# Patient Record
Sex: FEMALE | Race: WHITE | Employment: UNEMPLOYED | URBAN - METROPOLITAN AREA
[De-identification: names, ages, dates, MRNs, and addresses within clinical notes are randomized per-mention and may not be internally consistent; named-entity substitution may affect disease eponyms.]

---

## 2018-06-25 ENCOUNTER — DOCUMENTATION ONLY (OUTPATIENT)
Dept: SURGERY | Age: 43
End: 2018-06-25

## 2018-06-25 ENCOUNTER — HOSPITAL ENCOUNTER (OUTPATIENT)
Dept: MRI IMAGING | Age: 43
Discharge: HOME | End: 2018-06-25
Attending: OBSTETRICS & GYNECOLOGY
Payer: COMMERCIAL

## 2018-06-25 ENCOUNTER — OFFICE VISIT (OUTPATIENT)
Dept: SURGERY | Age: 43
End: 2018-06-25

## 2018-06-25 VITALS — BODY MASS INDEX: 22.5 KG/M2 | WEIGHT: 148 LBS

## 2018-06-25 VITALS
HEART RATE: 75 BPM | SYSTOLIC BLOOD PRESSURE: 111 MMHG | BODY MASS INDEX: 22.43 KG/M2 | WEIGHT: 148 LBS | DIASTOLIC BLOOD PRESSURE: 68 MMHG | HEIGHT: 68 IN

## 2018-06-25 DIAGNOSIS — C50.912 MALIGNANT NEOPLASM OF LEFT FEMALE BREAST, UNSPECIFIED ESTROGEN RECEPTOR STATUS, UNSPECIFIED SITE OF BREAST (HCC): Primary | ICD-10-CM

## 2018-06-25 DIAGNOSIS — C50.512 MALIGNANT NEOPLASM OF LOWER-OUTER QUADRANT OF LEFT BREAST OF FEMALE, ESTROGEN RECEPTOR POSITIVE (HCC): Primary | ICD-10-CM

## 2018-06-25 DIAGNOSIS — Z17.0 MALIGNANT NEOPLASM OF LOWER-OUTER QUADRANT OF LEFT BREAST OF FEMALE, ESTROGEN RECEPTOR POSITIVE (HCC): Primary | ICD-10-CM

## 2018-06-25 DIAGNOSIS — C50.819: ICD-10-CM

## 2018-06-25 PROCEDURE — 74011250636 HC RX REV CODE- 250/636: Performed by: OBSTETRICS & GYNECOLOGY

## 2018-06-25 PROCEDURE — 77059 MRI BREAST BI W WO CONT: CPT

## 2018-06-25 PROCEDURE — A9585 GADOBUTROL INJECTION: HCPCS | Performed by: OBSTETRICS & GYNECOLOGY

## 2018-06-25 RX ADMIN — GADOBUTROL 6 ML: 604.72 INJECTION INTRAVENOUS at 14:00

## 2018-06-25 NOTE — PATIENT INSTRUCTIONS
Breast Cancer: Care Instructions  Your Care Instructions    Breast cancer occurs when abnormal cells grow out of control in the breast. These cancer cells can spread within the breast, to nearby lymph nodes and other tissues, and to other parts of the body. Being treated for cancer can weaken your body, and you may feel very tired. Get the rest your body needs so you can feel better. Finding out that you have cancer is scary. You may feel many emotions and may need some help coping. Seek out family, friends, and counselors for support. You also can do things at home to make yourself feel better while you go through treatment. Call the [employer] (8-856.767.9291) or visit its website at [facility] for more information. Follow-up care is a key part of your treatment and safety. Be sure to make and go to all appointments, and call your doctor if you are having problems. It's also a good idea to know your test results and keep a list of the medicines you take. How can you care for yourself at home? · Take your medicines exactly as prescribed. Call your doctor if you think you are having a problem with your medicine. You may get medicine for nausea and vomiting if you have these side effects. · Follow your doctor's instructions to relieve pain. Pain from cancer and surgery can almost always be controlled. Use pain medicine when you first notice pain, before it becomes severe. · Eat healthy food. If you do not feel like eating, try to eat food that has protein and extra calories to keep up your strength and prevent weight loss. Drink liquid meal replacements for extra calories and protein. Try to eat your main meal early. · Get some physical activity every day, but do not get too tired. Keep doing the hobbies you enjoy as your energy allows. · Do not smoke. Smoking can make your cancer worse. If you need help quitting, talk to your doctor about stop-smoking programs and medicines.  These can increase your chances of quitting for good. · Take steps to control your stress and workload. Learn relaxation techniques. ¨ Share your feelings. Stress and tension affect our emotions. By expressing your feelings to others, you may be able to understand and cope with them. ¨ Consider joining a support group. Talking about a problem with your spouse, a good friend, or other people with similar problems is a good way to reduce tension and stress. ¨ Express yourself through art. Try writing, crafts, dance, or art to relieve stress. Some dance, writing, or art groups may be available just for people who have cancer. ¨ Be kind to your body and mind. Getting enough sleep, eating a healthy diet, and taking time to do things you enjoy can contribute to an overall feeling of balance in your life and can help reduce stress. ¨ Get help if you need it. Discuss your concerns with your doctor or counselor. · If you are vomiting or have diarrhea:  ¨ Drink plenty of fluids (enough so that your urine is light yellow or clear like water) to prevent dehydration. Choose water and other caffeine-free clear liquids. If you have kidney, heart, or liver disease and have to limit fluids, talk with your doctor before you increase the amount of fluids you drink. ¨ When you are able to eat, try clear soups, mild foods, and liquids until all symptoms are gone for 12 to 48 hours. Other good choices include dry toast, crackers, cooked cereal, and gelatin dessert, such as Jell-O.  · If you have not already done so, prepare a list of advance directives. Advance directives are instructions to your doctor and family members about what kind of care you want if you become unable to speak or express yourself. When should you call for help? Call 911 anytime you think you may need emergency care. For example, call if:  ? · You passed out (lost consciousness). ?Call your doctor now or seek immediate medical care if:  ? · You have a fever. ? · You have abnormal bleeding. ? · You think you have an infection. ? · You have new or worse pain. ? · You have new symptoms, such as a cough, belly pain, vomiting, diarrhea, or a rash. ? Watch closely for changes in your health, and be sure to contact your doctor if:  ? · You are much more tired than usual.   ? · You have swollen glands in your armpits, groin, or neck. ? · You do not get better as expected. Where can you learn more? Go to http://csear-tracee.info/. Enter V321 in the search box to learn more about \"Breast Cancer: Care Instructions. \"  Current as of: May 12, 2017  Content Version: 11.4  © 7292-7278 [employer]. Care instructions adapted under license by [employer] (which disclaims liability or warranty for this information). If you have questions about a medical condition or this instruction, always ask your healthcare professional. [address] any warranty or liability for your use of this information.

## 2018-06-25 NOTE — PROGRESS NOTES
Type of Film: [x] CD [] FILMS  Type of Test: [] MRI [x] MAMMO  From: 016/816 Geoffrey Brandt  Given to: given back to patient and instructed her to bring with her to MRI location ([address] [place] [address]) this afternoon.   LOCATION  To be Downloaded into PACS:  YES

## 2018-06-25 NOTE — PROGRESS NOTES
HISTORY OF PRESENT ILLNESS  Jocelyne Villasenor is a 43 y.o. female. HPI NEW Patient presents for consultation at the request of Dr. Linda Dubon at [facility] for newly diagnosed LEFT breast cancer. The patient noticed \"weird sensations\" in LEFT breast which led to diagnostic imaging and a biopsy. She is having tenderness at biopsy site. This is the first time she had a breast biopsy done. No family history of breast or ovarian cancer. Her mother had non-hodgkin's lymphoma. Breast imaging-  Screening mammogram was performed 17: BI-RADS 1. LEFT breast diagnostic mammogram and LEFT breast ultrasound done 18: BI-RADS 5. 2.1 cm hypoechoic mass 6:00 left breast.   LEFT breast biopsy done 18 at [facility]. Pathology revealed LEFT breast invasive carcinoma with lobular features, grade 2, ER positive, GA positive, HER 2 negative. The patient has a breast MRI scheduled for today 18. Past Medical History:   Diagnosis Date    Acquired hypothyroidism     on levothyroxine     Anemia NEC     slow fe     HX OTHER MEDICAL      live baby boy       Past Surgical History:   Procedure Laterality Date    HX OTHER SURGICAL      facial surgery     Social History     Social History    Marital status:      Spouse name: N/A    Number of children: N/A    Years of education: N/A     Occupational History    Not on file. Social History Main Topics    Smoking status: Former Smoker     Quit date:     Smokeless tobacco: Never Used    Alcohol use No    Drug use: No    Sexual activity: Yes     Partners: Male     Other Topics Concern    Not on file     Social History Narrative     OB History      Para Term  AB Living    2 2 2   2    SAB TAB Ectopic Molar Multiple Live Births         1        Obstetric Comments    Menarche:  15. LMP: 18. # of Children:  2. Age at Delivery of First Child:  35.   Hysterectomy/oophorectomy:  NO/NO.   Breast Bx: yes.  Hx of Breast Feeding:  yes. BCP:  yes. Hormone therapy:  no.               Current Outpatient Prescriptions:     levothyroxine (SYNTHROID) 125 mcg tablet, Take 137 mcg by mouth Daily (before breakfast). Indications: hypothyroidism, Disp: , Rfl:   No Known Allergies      Review of Systems   Constitutional: Negative. HENT: Negative. Eyes: Negative. Respiratory: Negative. Cardiovascular: Negative. Gastrointestinal: Negative. Genitourinary: Negative. Musculoskeletal: Negative. Skin: Negative. Neurological: Negative. Endo/Heme/Allergies: Negative. Psychiatric/Behavioral: Negative. Physical Exam   Constitutional: She is oriented to person, place, and time. She appears well-nourished. HENT:   Head: Normocephalic. Eyes: EOM are normal.   Neck: Neck supple. No thyromegaly present. Cardiovascular: Normal rate, regular rhythm, normal heart sounds and intact distal pulses. Pulmonary/Chest: Effort normal and breath sounds normal. Right breast exhibits no inverted nipple, no mass, no nipple discharge, no skin change and no tenderness. Left breast exhibits mass (2 cm firm mass 6:00 left breast ). Left breast exhibits no inverted nipple, no nipple discharge, no skin change and no tenderness. Breasts are symmetrical.       Musculoskeletal: Normal range of motion. Lymphadenopathy:     She has no cervical adenopathy. She has no axillary adenopathy. Neurological: She is alert and oriented to person, place, and time. Skin: Skin is warm and dry. Psychiatric: She has a normal mood and affect. Nursing note and vitals reviewed. ASSESSMENT and PLAN    ICD-10-CM ICD-9-CM    1. Malignant neoplasm of lower-outer quadrant of left breast of female, estrogen receptor positive ([facility] [address] C50.512 174.5     Z17.0 V86.0      44 yo female with left breast T2 N0  invasive carcinoma with lobular features, grade 2, ER positive, MT positive, HER 2 negative.   Also lcis and papilloma on path.  She is here with her . I have reviewed the imaging and pathology with her and she was given copies of these reports. 90 minutes were spent face-to-face with the patient during this encounter and 90% of that time was spent on counseling and coordination of care. 1. Discussed lumpectomy and radiation vs mastectomy. Discussed reconstruction. MRI ordered to see if patient is a candidate for a lumpectomy. This is schedule for this afternoon. 2. Discussed sentinel lymph node biopsy. 3. Discussed external beam radiation. 4. Discussed hormone therapy. 5. Discussed the possibility of chemotherapy. 6. Discussed genetic testing will send gene panel    Will see what breast mri looks like. She has small breasts so if any larger extent of disease may need mastectomy recon. If she has gene mutation needs mastectomies. I will call her tomorrow after breast mri and send gene panel today. They were happy with the plan.

## 2018-06-25 NOTE — LETTER
2018 11:08 AM 
 
Patient:  Trenton Aceves YOB: 1975 Date of Visit: 2018 Dear Jes Johnson MD 
[address] [id number] VIA In Basket 
 : Thank you for referring Ms. Mack Aquino to me for evaluation/treatment. Below are the relevant portions of my assessment and plan of care. HISTORY OF PRESENT ILLNESS Trenton Aceves is a 43 y.o. female. HPI NEW Patient presents for consultation at the request of Dr. Alf Gray at [facility] for newly diagnosed LEFT breast cancer. The patient noticed \"weird sensations\" in LEFT breast which led to diagnostic imaging and a biopsy. She is having tenderness at biopsy site. This is the first time she had a breast biopsy done. No family history of breast or ovarian cancer. Her mother had non-hodgkin's lymphoma. Breast imaging- 
Screening mammogram was performed 17: BI-RADS 1. LEFT breast diagnostic mammogram and LEFT breast ultrasound done 18: BI-RADS 5. 2.1 cm hypoechoic mass 6:00 left breast.  
LEFT breast biopsy done 18 at [facility]. Pathology revealed LEFT breast invasive carcinoma with lobular features, grade 2, ER positive, IA positive, HER 2 negative. The patient has a breast MRI scheduled for today 18. Past Medical History:  
Diagnosis Date  Acquired hypothyroidism   
 on levothyroxine  Anemia NEC   
 slow fe  HX OTHER MEDICAL   
  live baby boy  Past Surgical History:  
Procedure Laterality Date  HX OTHER SURGICAL    
 facial surgery Social History Social History  Marital status:  Spouse name: N/A  
 Number of children: N/A  
 Years of education: N/A Occupational History  Not on file. Social History Main Topics  Smoking status: Former Smoker Quit date:   Smokeless tobacco: Never Used  Alcohol use No  
 Drug use: No  
 Sexual activity: Yes  
  Partners: Male Other Topics Concern  Not on file Social History Narrative OB History  Para Term  AB Living 2 2 2   2 SAB TAB Ectopic Molar Multiple Live Births 1 Obstetric Comments Menarche:  15. LMP: 18. # of Children:  2. Age at Delivery of First Child:  35.   Hysterectomy/oophorectomy:  NO/NO. Breast Bx:  yes. Hx of Breast Feeding:  yes. BCP:  yes. Hormone therapy:  no.  
 
  
  
 
 
Current Outpatient Prescriptions:  
  levothyroxine (SYNTHROID) 125 mcg tablet, Take 137 mcg by mouth Daily (before breakfast). Indications: hypothyroidism, Disp: , Rfl:  
No Known Allergies Review of Systems Constitutional: Negative. HENT: Negative. Eyes: Negative. Respiratory: Negative. Cardiovascular: Negative. Gastrointestinal: Negative. Genitourinary: Negative. Musculoskeletal: Negative. Skin: Negative. Neurological: Negative. Endo/Heme/Allergies: Negative. Psychiatric/Behavioral: Negative. Physical Exam  
Constitutional: She is oriented to person, place, and time. She appears well-nourished. HENT:  
Head: Normocephalic. Eyes: EOM are normal.  
Neck: Neck supple. No thyromegaly present. Cardiovascular: Normal rate, regular rhythm, normal heart sounds and intact distal pulses. Pulmonary/Chest: Effort normal and breath sounds normal. Right breast exhibits no inverted nipple, no mass, no nipple discharge, no skin change and no tenderness. Left breast exhibits mass (2 cm firm mass 6:00 left breast ). Left breast exhibits no inverted nipple, no nipple discharge, no skin change and no tenderness. Breasts are symmetrical.  
 
 
Musculoskeletal: Normal range of motion. Lymphadenopathy:  
  She has no cervical adenopathy. She has no axillary adenopathy. Neurological: She is alert and oriented to person, place, and time. Skin: Skin is warm and dry. Psychiatric: She has a normal mood and affect. Nursing note and vitals reviewed. ASSESSMENT and PLAN 
  ICD-10-CM ICD-9-CM 1. Malignant neoplasm of lower-outer quadrant of left breast of female, estrogen receptor positive ([facility] [address] C50.512 174.5   
 Z17.0 V86.0   
 
42 yo female with left breast T2 N0  invasive carcinoma with lobular features, grade 2, ER positive, SD positive, HER 2 negative. Also lcis and papilloma on path. She is here with her . I have reviewed the imaging and pathology with her and she was given copies of these reports. 90 minutes were spent face-to-face with the patient during this encounter and 90% of that time was spent on counseling and coordination of care. 1. Discussed lumpectomy and radiation vs mastectomy. Discussed reconstruction. MRI ordered to see if patient is a candidate for a lumpectomy. This is schedule for this afternoon. 2. Discussed sentinel lymph node biopsy. 3. Discussed external beam radiation. 4. Discussed hormone therapy. 5. Discussed the possibility of chemotherapy. 6. Discussed genetic testing will send gene panel Will see what breast mri looks like. She has small breasts so if any larger extent of disease may need mastectomy recon. If she has gene mutation needs mastectomies. I will call her tomorrow after breast mri and send gene panel today. They were happy with the plan. If you have questions, please do not hesitate to call me. I look forward to following Ms. Villasenor along with you. Sincerely, Patito Villanueva MD

## 2018-06-25 NOTE — COMMUNICATION BODY
HISTORY OF PRESENT ILLNESS  Fidencio Dakin is a 43 y.o. female. HPI NEW Patient presents for consultation at the request of Dr. Kayode Burns at [facility] for newly diagnosed LEFT breast cancer. The patient noticed \"weird sensations\" in LEFT breast which led to diagnostic imaging and a biopsy. She is having tenderness at biopsy site. This is the first time she had a breast biopsy done. No family history of breast or ovarian cancer. Her mother had non-hodgkin's lymphoma. Breast imaging-  Screening mammogram was performed 17: BI-RADS 1. LEFT breast diagnostic mammogram and LEFT breast ultrasound done 18: BI-RADS 5. 2.1 cm hypoechoic mass 6:00 left breast.   LEFT breast biopsy done 18 at [facility]. Pathology revealed LEFT breast invasive carcinoma with lobular features, grade 2, ER positive, AL positive, HER 2 negative. The patient has a breast MRI scheduled for today 18. Past Medical History:   Diagnosis Date    Acquired hypothyroidism     on levothyroxine     Anemia NEC     slow fe     HX OTHER MEDICAL      live baby boy       Past Surgical History:   Procedure Laterality Date    HX OTHER SURGICAL      facial surgery     Social History     Social History    Marital status:      Spouse name: N/A    Number of children: N/A    Years of education: N/A     Occupational History    Not on file. Social History Main Topics    Smoking status: Former Smoker     Quit date:     Smokeless tobacco: Never Used    Alcohol use No    Drug use: No    Sexual activity: Yes     Partners: Male     Other Topics Concern    Not on file     Social History Narrative     OB History      Para Term  AB Living    2 2 2   2    SAB TAB Ectopic Molar Multiple Live Births         1        Obstetric Comments    Menarche:  15. LMP: 18. # of Children:  2. Age at Delivery of First Child:  35.   Hysterectomy/oophorectomy:  NO/NO.   Breast Bx: yes.  Hx of Breast Feeding:  yes. BCP:  yes. Hormone therapy:  no.               Current Outpatient Prescriptions:     levothyroxine (SYNTHROID) 125 mcg tablet, Take 137 mcg by mouth Daily (before breakfast). Indications: hypothyroidism, Disp: , Rfl:   No Known Allergies      Review of Systems   Constitutional: Negative. HENT: Negative. Eyes: Negative. Respiratory: Negative. Cardiovascular: Negative. Gastrointestinal: Negative. Genitourinary: Negative. Musculoskeletal: Negative. Skin: Negative. Neurological: Negative. Endo/Heme/Allergies: Negative. Psychiatric/Behavioral: Negative. Physical Exam   Constitutional: She is oriented to person, place, and time. She appears well-nourished. HENT:   Head: Normocephalic. Eyes: EOM are normal.   Neck: Neck supple. No thyromegaly present. Cardiovascular: Normal rate, regular rhythm, normal heart sounds and intact distal pulses. Pulmonary/Chest: Effort normal and breath sounds normal. Right breast exhibits no inverted nipple, no mass, no nipple discharge, no skin change and no tenderness. Left breast exhibits mass (2 cm firm mass 6:00 left breast ). Left breast exhibits no inverted nipple, no nipple discharge, no skin change and no tenderness. Breasts are symmetrical.       Musculoskeletal: Normal range of motion. Lymphadenopathy:     She has no cervical adenopathy. She has no axillary adenopathy. Neurological: She is alert and oriented to person, place, and time. Skin: Skin is warm and dry. Psychiatric: She has a normal mood and affect. Nursing note and vitals reviewed. ASSESSMENT and PLAN    ICD-10-CM ICD-9-CM    1. Malignant neoplasm of lower-outer quadrant of left breast of female, estrogen receptor positive ([facility] [address] C50.512 174.5     Z17.0 V86.0      44 yo female with left breast T2 N0  invasive carcinoma with lobular features, grade 2, ER positive, UT positive, HER 2 negative.   Also lcis and papilloma on path.  She is here with her . I have reviewed the imaging and pathology with her and she was given copies of these reports. 90 minutes were spent face-to-face with the patient during this encounter and 90% of that time was spent on counseling and coordination of care. 1. Discussed lumpectomy and radiation vs mastectomy. Discussed reconstruction. MRI ordered to see if patient is a candidate for a lumpectomy. This is schedule for this afternoon. 2. Discussed sentinel lymph node biopsy. 3. Discussed external beam radiation. 4. Discussed hormone therapy. 5. Discussed the possibility of chemotherapy. 6. Discussed genetic testing will send gene panel    Will see what breast mri looks like. She has small breasts so if any larger extent of disease may need mastectomy recon. If she has gene mutation needs mastectomies. I will call her tomorrow after breast mri and send gene panel today. They were happy with the plan.

## 2018-06-27 ENCOUNTER — TELEPHONE (OUTPATIENT)
Dept: SURGERY | Age: 43
End: 2018-06-27

## 2018-06-27 DIAGNOSIS — C50.512 MALIGNANT NEOPLASM OF LOWER-OUTER QUADRANT OF LEFT BREAST OF FEMALE, ESTROGEN RECEPTOR POSITIVE (HCC): Primary | ICD-10-CM

## 2018-06-27 DIAGNOSIS — Z17.0 MALIGNANT NEOPLASM OF LOWER-OUTER QUADRANT OF LEFT BREAST OF FEMALE, ESTROGEN RECEPTOR POSITIVE (HCC): Primary | ICD-10-CM

## 2018-06-27 NOTE — TELEPHONE ENCOUNTER
The patient called after speaking to Dr. Pablo Monk yesterday evening wanting to\"speed up the process\" of getting her appointment with a plastic surgeon. I instructed her that the  who makes these appointments is well aware of the order to schedule and she has 72 hours to get back to her. I assured the patient we were on top of it and someone will call her ASAP. She was appreciative for the call.

## 2018-06-28 ENCOUNTER — TELEPHONE (OUTPATIENT)
Dept: SURGERY | Age: 43
End: 2018-06-28

## 2018-07-11 ENCOUNTER — DOCUMENTATION ONLY (OUTPATIENT)
Dept: SURGERY | Age: 43
End: 2018-07-11

## 2018-07-11 NOTE — PROGRESS NOTES
Faxed office visit note to [employer], per request, for insurance coverage purposes. Fax # [phone]. Fax confirmation received.

## 2018-07-13 ENCOUNTER — DOCUMENTATION ONLY (OUTPATIENT)
Dept: SURGERY | Age: 43
End: 2018-07-13

## 2018-07-13 ENCOUNTER — TELEPHONE (OUTPATIENT)
Dept: SURGERY | Age: 43
End: 2018-07-13

## 2018-07-13 NOTE — TELEPHONE ENCOUNTER
Patient returned my call. She would like for us to set up a pre-op appt with med onc Dr. Gus Mireles. Will send this to Zana Guillory. Patient requesting that breast MRI be emailed to her. Confirmed email address in chart. Patient wanting to make pre-op appt to see Dr. Derek Pizano. PSR not available to talk right now. I will email PSRs and have someone call the patient to schedule.

## 2018-07-13 NOTE — PROGRESS NOTES
SURGERY SCHEDULED AT [facility] ON 7/31/18 AT 36 AM FOR LEFT BREAST NIPPLE DELAY . PATIENT TO ARRIVE  AM  [facility] ON 7/30/18 AT 3 PM ARRIVING  PM    PRE OP TESTING AT [place] ON 7/23/18 ARRIVING AT 10 AM    SURGERY AT [facility] ON 8/14/18 AT 1130 AM ARRIVING AT 10 AM    PRE OP INFORMATION GIVEN AND INSTRUCTIONS ON  ALL APPOINTMENTS MADE.

## 2018-07-13 NOTE — TELEPHONE ENCOUNTER
Returned pt's call. No answer. Left message with the followin) Dr. Romana Sibley would like to see her in office pre-op    2) Dr. Chet Green is med onc. We can make pre-op appt if patient wishes (requested that she call us back to let us know)    3) Patient is requesting copy of breast mri report. I requested that she call me back so I know how to get this to her. Provided office call back number.

## 2018-07-13 NOTE — TELEPHONE ENCOUNTER
----- Message from Reji Goldstein MD sent at 7/13/2018 10:23 AM EDT -----  Regarding: RE: med onc  1. Yes I would like to see her again  2. For med onc dr. Morris Almeida. She can see her preop if she wants.   ----- Message -----     From: Devonte García RN     Sent: 7/13/2018  10:01 AM       To: Reji Goldstein MD  Subject: med onc                                          Patient wants to know if she needs a pre-op appt with you prior to surgery. Also wants to know which medical oncologist you would send her to.     Thanks,  Katy Horne

## 2018-07-16 ENCOUNTER — DOCUMENTATION ONLY (OUTPATIENT)
Dept: SURGERY | Age: 43
End: 2018-07-16

## 2018-07-16 DIAGNOSIS — C50.512 MALIGNANT NEOPLASM OF LOWER-OUTER QUADRANT OF LEFT BREAST OF FEMALE, ESTROGEN RECEPTOR POSITIVE (HCC): Primary | ICD-10-CM

## 2018-07-16 DIAGNOSIS — Z17.0 MALIGNANT NEOPLASM OF LOWER-OUTER QUADRANT OF LEFT BREAST OF FEMALE, ESTROGEN RECEPTOR POSITIVE (HCC): Primary | ICD-10-CM

## 2018-07-16 NOTE — PROGRESS NOTES
Osorio Hurley appointment  July 20, 2018 @ 2:00 pm. [facility] . 2nd floor [address]. Patient may have to change the date .

## 2018-07-17 DIAGNOSIS — C50.512 MALIGNANT NEOPLASM OF LOWER-OUTER QUADRANT OF LEFT BREAST OF FEMALE, ESTROGEN RECEPTOR POSITIVE (HCC): Primary | ICD-10-CM

## 2018-07-17 DIAGNOSIS — Z17.0 MALIGNANT NEOPLASM OF LOWER-OUTER QUADRANT OF LEFT BREAST OF FEMALE, ESTROGEN RECEPTOR POSITIVE (HCC): Primary | ICD-10-CM

## 2018-07-18 ENCOUNTER — OFFICE VISIT (OUTPATIENT)
Dept: SURGERY | Age: 43
End: 2018-07-18

## 2018-07-18 ENCOUNTER — DOCUMENTATION ONLY (OUTPATIENT)
Dept: SURGERY | Age: 43
End: 2018-07-18

## 2018-07-18 VITALS — BODY MASS INDEX: 22.43 KG/M2 | HEART RATE: 69 BPM | HEIGHT: 68 IN | WEIGHT: 148 LBS

## 2018-07-18 DIAGNOSIS — C50.512 MALIGNANT NEOPLASM OF LOWER-OUTER QUADRANT OF LEFT BREAST OF FEMALE, ESTROGEN RECEPTOR POSITIVE (HCC): Primary | ICD-10-CM

## 2018-07-18 DIAGNOSIS — Z17.0 MALIGNANT NEOPLASM OF LOWER-OUTER QUADRANT OF LEFT BREAST OF FEMALE, ESTROGEN RECEPTOR POSITIVE (HCC): Primary | ICD-10-CM

## 2018-07-18 NOTE — PROGRESS NOTES
HISTORY OF PRESENT ILLNESS  Marla Calderón is a 43 y.o. female. HPI  ESTABLISHED patient here for to discuss breast cancer surgery. She is here with her . Nipple delay surgery is scheduled for 7/31/18 and her LEFT breast skin and nipple sparing mastectomy with reconstrction is scheduled for 8/14/18. She is doing well. Still has some tenderness to her nipple from her biopsy. 42 yo female with left breast T2 N0  invasive carcinoma with lobular features, grade 2, ER positive, NE positive, HER 2 negative. Also lcis and papilloma on path. No family history of breast or ovarian cancer. Her mother had non-hodgkin's lymphoma. 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 Review of Systems   All other systems reviewed and are negative. Physical Exam   Pulmonary/Chest:   Exam unchanged     Nursing note and vitals reviewed. ASSESSMENT and PLAN    ICD-10-CM ICD-9-CM    1. Malignant neoplasm of lower-outer quadrant of left breast of female, estrogen receptor positive ([facility] C50.512 174.5     Z17.0 V86.0      42 yo female with  left breast T2 N0  invasive carcinoma with lobular features, grade 2, ER positive, NE positive, HER 2 negative. Also lcis and papilloma on path. She is here to discuss surgery and postoperative care. We discussed the procedures of left nipple delay, sln biopsy, skin and nipple sparing mastectomy on left. Risks include bleeding, bruising, scar, infection, need for more surgery and lymphedema. She understood and wished to proceed. Discussed referral to med onc. She has appointments with Dr. Horacio Carson and Dr. Jessica George  Discussed genetic testing results of vus. Discussed postop wound care and activity restrictions. Total discussion time 30 minutes.

## 2018-07-18 NOTE — PATIENT INSTRUCTIONS
Breast Cancer: Care Instructions  Your Care Instructions    Breast cancer occurs when abnormal cells grow out of control in the breast. These cancer cells can spread within the breast, to nearby lymph nodes and other tissues, and to other parts of the body. Being treated for cancer can weaken your body, and you may feel very tired. Get the rest your body needs so you can feel better. Finding out that you have cancer is scary. You may feel many emotions and may need some help coping. Seek out family, friends, and counselors for support. You also can do things at home to make yourself feel better while you go through treatment. Call the [employer] (4-289.275.3388) or visit its website at [email or web address] for more information. Follow-up care is a key part of your treatment and safety. Be sure to make and go to all appointments, and call your doctor if you are having problems. It's also a good idea to know your test results and keep a list of the medicines you take. How can you care for yourself at home? · Take your medicines exactly as prescribed. Call your doctor if you think you are having a problem with your medicine. You may get medicine for nausea and vomiting if you have these side effects. · Follow your doctor's instructions to relieve pain. Pain from cancer and surgery can almost always be controlled. Use pain medicine when you first notice pain, before it becomes severe. · Eat healthy food. If you do not feel like eating, try to eat food that has protein and extra calories to keep up your strength and prevent weight loss. Drink liquid meal replacements for extra calories and protein. Try to eat your main meal early. · Get some physical activity every day, but do not get too tired. Keep doing the hobbies you enjoy as your energy allows. · Do not smoke. Smoking can make your cancer worse. If you need help quitting, talk to your doctor about stop-smoking programs and medicines.  These can increase your chances of quitting for good. · Take steps to control your stress and workload. Learn relaxation techniques. ¨ Share your feelings. Stress and tension affect our emotions. By expressing your feelings to others, you may be able to understand and cope with them. ¨ Consider joining a support group. Talking about a problem with your spouse, a good friend, or other people with similar problems is a good way to reduce tension and stress. ¨ Express yourself through art. Try writing, crafts, dance, or art to relieve stress. Some dance, writing, or art groups may be available just for people who have cancer. ¨ Be kind to your body and mind. Getting enough sleep, eating a healthy diet, and taking time to do things you enjoy can contribute to an overall feeling of balance in your life and can help reduce stress. ¨ Get help if you need it. Discuss your concerns with your doctor or counselor. · If you are vomiting or have diarrhea:  ¨ Drink plenty of fluids (enough so that your urine is light yellow or clear like water) to prevent dehydration. Choose water and other caffeine-free clear liquids. If you have kidney, heart, or liver disease and have to limit fluids, talk with your doctor before you increase the amount of fluids you drink. ¨ When you are able to eat, try clear soups, mild foods, and liquids until all symptoms are gone for 12 to 48 hours. Other good choices include dry toast, crackers, cooked cereal, and gelatin dessert, such as Jell-O.  · If you have not already done so, prepare a list of advance directives. Advance directives are instructions to your doctor and family members about what kind of care you want if you become unable to speak or express yourself. When should you call for help? Call 911 anytime you think you may need emergency care.  For example, call if:    · You passed out (lost consciousness).    Call your doctor now or seek immediate medical care if:    · You have a fever.     · You have abnormal bleeding.     · You think you have an infection.     · You have new or worse pain.     · You have new symptoms, such as a cough, belly pain, vomiting, diarrhea, or a rash.    Watch closely for changes in your health, and be sure to contact your doctor if:    · You are much more tired than usual.     · You have swollen glands in your armpits, groin, or neck.     · You do not get better as expected. Where can you learn more? Go to http://cesar-[employer].info/. Enter V321 in the search box to learn more about \"Breast Cancer: Care Instructions. \"  Current as of: May 12, 2017  Content Version: 11.7  © 3304-6401 [employer]. Care instructions adapted under license by [employer] (which disclaims liability or warranty for this information). If you have questions about a medical condition or this instruction, always ask your healthcare professional. [address] any warranty or liability for your use of this information.

## 2018-07-23 ENCOUNTER — HOSPITAL ENCOUNTER (OUTPATIENT)
Dept: PREADMISSION TESTING | Age: 43
Discharge: HOME | End: 2018-07-23
Payer: COMMERCIAL

## 2018-07-23 VITALS
TEMPERATURE: 98.4 F | WEIGHT: 146.25 LBS | DIASTOLIC BLOOD PRESSURE: 70 MMHG | HEIGHT: 68 IN | HEART RATE: 60 BPM | BODY MASS INDEX: 22.17 KG/M2 | SYSTOLIC BLOOD PRESSURE: 108 MMHG

## 2018-07-23 LAB
ANION GAP SERPL CALC-SCNC: 5 MMOL/L (ref 5–15)
BUN SERPL-MCNC: 18 MG/DL (ref 6–20)
BUN/CREAT SERPL: 20 (ref 12–20)
CALCIUM SERPL-MCNC: 9.2 MG/DL (ref 8.5–10.1)
CHLORIDE SERPL-SCNC: 109 MMOL/L (ref 97–108)
CO2 SERPL-SCNC: 28 MMOL/L (ref 21–32)
CREAT SERPL-MCNC: 0.92 MG/DL (ref 0.55–1.02)
ERYTHROCYTE [DISTWIDTH] IN BLOOD BY AUTOMATED COUNT: 12.5 % (ref 11.5–14.5)
GLUCOSE SERPL-MCNC: 91 MG/DL (ref 65–100)
HCT VFR BLD AUTO: 38.3 % (ref 35–47)
HGB BLD-MCNC: 12.4 G/DL (ref 11.5–16)
MCH RBC QN AUTO: 30 PG (ref 26–34)
MCHC RBC AUTO-ENTMCNC: 32.4 G/DL (ref 30–36.5)
MCV RBC AUTO: 92.7 FL (ref 80–99)
NRBC # BLD: 0 K/UL (ref 0–0.01)
NRBC BLD-RTO: 0 PER 100 WBC
PLATELET # BLD AUTO: 246 K/UL (ref 150–400)
PMV BLD AUTO: 10.1 FL (ref 8.9–12.9)
POTASSIUM SERPL-SCNC: 4.3 MMOL/L (ref 3.5–5.1)
RBC # BLD AUTO: 4.13 M/UL (ref 3.8–5.2)
SODIUM SERPL-SCNC: 142 MMOL/L (ref 136–145)
WBC # BLD AUTO: 6.4 K/UL (ref 3.6–11)

## 2018-07-23 PROCEDURE — 80048 BASIC METABOLIC PNL TOTAL CA: CPT | Performed by: SURGERY

## 2018-07-23 PROCEDURE — 85027 COMPLETE CBC AUTOMATED: CPT | Performed by: SURGERY

## 2018-07-23 PROCEDURE — 36415 COLL VENOUS BLD VENIPUNCTURE: CPT | Performed by: SURGERY

## 2018-07-23 RX ORDER — ACETAMINOPHEN 325 MG/1
650 TABLET ORAL
COMMUNITY
End: 2018-07-31

## 2018-07-24 ENCOUNTER — TELEPHONE (OUTPATIENT)
Dept: SURGERY | Age: 43
End: 2018-07-24

## 2018-07-24 NOTE — TELEPHONE ENCOUNTER
L/M that she wants to get an appointment with Dr. Odell Mcdonald pre-op. They will not make the appointment until they have the records. I called patient back. I let her know that we would fax everything tomorrow. She would like to see Dr. Odell Mcdonald pre-op. Is going to see Dr. Tess Streeter and Dr. Alexus Esquivel as well. She was very appreciative of the return call.

## 2018-07-25 NOTE — TELEPHONE ENCOUNTER
Faxed patient's records to Dr. Akins [facility] office, fax # [phone], per patient request. Fax confirmation received.

## 2018-07-26 ENCOUNTER — DOCUMENTATION ONLY (OUTPATIENT)
Dept: SURGERY | Age: 43
End: 2018-07-26

## 2018-07-26 NOTE — PROGRESS NOTES
Faxed office visit notes to Yuliya Fan, per request, fax # [phone]. Fax confirmation received.

## 2018-07-27 ENCOUNTER — DOCUMENTATION ONLY (OUTPATIENT)
Dept: SURGERY | Age: 43
End: 2018-07-27

## 2018-07-27 NOTE — PROGRESS NOTES
Completed disability/FMLA ppw and mailed via safe mail to patient's . Copy will be scanned into patient's chart.

## 2018-07-30 ENCOUNTER — ANESTHESIA EVENT (OUTPATIENT)
Dept: MEDSURG UNIT | Age: 43
End: 2018-07-30
Payer: COMMERCIAL

## 2018-07-30 ENCOUNTER — HOSPITAL ENCOUNTER (OUTPATIENT)
Dept: NUCLEAR MEDICINE | Age: 43
Discharge: HOME | End: 2018-07-30
Attending: SURGERY
Payer: COMMERCIAL

## 2018-07-30 DIAGNOSIS — C50.919 BREAST CANCER (HCC): ICD-10-CM

## 2018-07-30 PROCEDURE — 78195 LYMPH SYSTEM IMAGING: CPT

## 2018-07-31 ENCOUNTER — HOSPITAL ENCOUNTER (OUTPATIENT)
Age: 43
Setting detail: OUTPATIENT SURGERY
Discharge: HOME | End: 2018-07-31
Attending: SURGERY | Admitting: SURGERY
Payer: COMMERCIAL

## 2018-07-31 ENCOUNTER — ANESTHESIA (OUTPATIENT)
Dept: MEDSURG UNIT | Age: 43
End: 2018-07-31
Payer: COMMERCIAL

## 2018-07-31 VITALS
BODY MASS INDEX: 22.13 KG/M2 | HEART RATE: 69 BPM | RESPIRATION RATE: 15 BRPM | OXYGEN SATURATION: 100 % | SYSTOLIC BLOOD PRESSURE: 119 MMHG | DIASTOLIC BLOOD PRESSURE: 70 MMHG | TEMPERATURE: 98.1 F | WEIGHT: 146 LBS | HEIGHT: 68 IN

## 2018-07-31 LAB — HCG UR QL: NEGATIVE

## 2018-07-31 PROCEDURE — 88360 TUMOR IMMUNOHISTOCHEM/MANUAL: CPT | Performed by: SURGERY

## 2018-07-31 PROCEDURE — 77030039266 HC ADH SKN EXOFIN S2SG -A: Performed by: SURGERY

## 2018-07-31 PROCEDURE — 77030013567 HC DRN WND RESERV BARD -A: Performed by: SURGERY

## 2018-07-31 PROCEDURE — 77030011267 HC ELECTRD BLD COVD -A: Performed by: SURGERY

## 2018-07-31 PROCEDURE — 77030034626 HC LIGASURE SM JAW SEAL OPN SURG COVD -E: Performed by: SURGERY

## 2018-07-31 PROCEDURE — 81025 URINE PREGNANCY TEST: CPT

## 2018-07-31 PROCEDURE — 77030011266 HC ELECTRD BLD INSL COVD -A: Performed by: SURGERY

## 2018-07-31 PROCEDURE — 77030002933 HC SUT MCRYL J&J -A: Performed by: SURGERY

## 2018-07-31 PROCEDURE — 74011250636 HC RX REV CODE- 250/636

## 2018-07-31 PROCEDURE — 74011250636 HC RX REV CODE- 250/636: Performed by: ANESTHESIOLOGY

## 2018-07-31 PROCEDURE — 77030011825 HC SUPP SURG PSTOP S2SG -B: Performed by: SURGERY

## 2018-07-31 PROCEDURE — 77030020782 HC GWN BAIR PAWS FLX 3M -B

## 2018-07-31 PROCEDURE — 77030010514 HC APPL CLP LIG COVD -B: Performed by: SURGERY

## 2018-07-31 PROCEDURE — 77030012407 HC DRN WND BARD -B: Performed by: SURGERY

## 2018-07-31 PROCEDURE — 76030000004 HC AMB SURG OR TIME 2 TO 2.5: Performed by: SURGERY

## 2018-07-31 PROCEDURE — 77030031139 HC SUT VCRL2 J&J -A: Performed by: SURGERY

## 2018-07-31 PROCEDURE — 88342 IMHCHEM/IMCYTCHM 1ST ANTB: CPT | Performed by: SURGERY

## 2018-07-31 PROCEDURE — 88366 INSITU HYBRIDIZATION (FISH): CPT | Performed by: SURGERY

## 2018-07-31 PROCEDURE — 74011250636 HC RX REV CODE- 250/636: Performed by: SURGERY

## 2018-07-31 PROCEDURE — 74011000250 HC RX REV CODE- 250

## 2018-07-31 PROCEDURE — C9290 INJ, BUPIVACAINE LIPOSOME: HCPCS | Performed by: SURGERY

## 2018-07-31 PROCEDURE — 74011250637 HC RX REV CODE- 250/637: Performed by: ANESTHESIOLOGY

## 2018-07-31 PROCEDURE — 74011000250 HC RX REV CODE- 250: Performed by: SURGERY

## 2018-07-31 PROCEDURE — 77030011640 HC PAD GRND REM COVD -A: Performed by: SURGERY

## 2018-07-31 PROCEDURE — 77030010120 HC SHR COAG HARMO J&J -E: Performed by: SURGERY

## 2018-07-31 PROCEDURE — 77030010509 HC AIRWY LMA MSK TELE -A: Performed by: ANESTHESIOLOGY

## 2018-07-31 PROCEDURE — 76210000035 HC AMBSU PH I REC 1 TO 1.5 HR: Performed by: SURGERY

## 2018-07-31 PROCEDURE — 76060000064 HC AMB SURG ANES 2 TO 2.5 HR: Performed by: SURGERY

## 2018-07-31 PROCEDURE — 77030031304 HC WAVWGD EIGR DISP INVO -D: Performed by: SURGERY

## 2018-07-31 PROCEDURE — 88307 TISSUE EXAM BY PATHOLOGIST: CPT | Performed by: SURGERY

## 2018-07-31 PROCEDURE — 76210000050 HC AMBSU PH II REC 0.5 TO 1 HR: Performed by: SURGERY

## 2018-07-31 PROCEDURE — 77030002986 HC SUT PROL J&J -A: Performed by: SURGERY

## 2018-07-31 PROCEDURE — 77030018836 HC SOL IRR NACL ICUM -A: Performed by: SURGERY

## 2018-07-31 PROCEDURE — 88331 PATH CONSLTJ SURG 1 BLK 1SPC: CPT | Performed by: SURGERY

## 2018-07-31 PROCEDURE — 77030032490 HC SLV COMPR SCD KNE COVD -B: Performed by: SURGERY

## 2018-07-31 RX ORDER — FENTANYL CITRATE 50 UG/ML
50 INJECTION, SOLUTION INTRAMUSCULAR; INTRAVENOUS AS NEEDED
Status: DISCONTINUED | OUTPATIENT
Start: 2018-07-31 | End: 2018-07-31 | Stop reason: HOSPADM

## 2018-07-31 RX ORDER — FENTANYL CITRATE 50 UG/ML
25 INJECTION, SOLUTION INTRAMUSCULAR; INTRAVENOUS
Status: DISCONTINUED | OUTPATIENT
Start: 2018-07-31 | End: 2018-07-31 | Stop reason: HOSPADM

## 2018-07-31 RX ORDER — MIDAZOLAM HYDROCHLORIDE 1 MG/ML
1 INJECTION, SOLUTION INTRAMUSCULAR; INTRAVENOUS AS NEEDED
Status: DISCONTINUED | OUTPATIENT
Start: 2018-07-31 | End: 2018-07-31 | Stop reason: HOSPADM

## 2018-07-31 RX ORDER — EPHEDRINE SULFATE 50 MG/ML
INJECTION, SOLUTION INTRAVENOUS AS NEEDED
Status: DISCONTINUED | OUTPATIENT
Start: 2018-07-31 | End: 2018-07-31 | Stop reason: HOSPADM

## 2018-07-31 RX ORDER — SODIUM CHLORIDE, SODIUM LACTATE, POTASSIUM CHLORIDE, CALCIUM CHLORIDE 600; 310; 30; 20 MG/100ML; MG/100ML; MG/100ML; MG/100ML
25 INJECTION, SOLUTION INTRAVENOUS CONTINUOUS
Status: DISCONTINUED | OUTPATIENT
Start: 2018-07-31 | End: 2018-07-31 | Stop reason: HOSPADM

## 2018-07-31 RX ORDER — MORPHINE SULFATE 10 MG/ML
2 INJECTION, SOLUTION INTRAMUSCULAR; INTRAVENOUS
Status: DISCONTINUED | OUTPATIENT
Start: 2018-07-31 | End: 2018-07-31 | Stop reason: HOSPADM

## 2018-07-31 RX ORDER — SODIUM CHLORIDE 9 MG/ML
25 INJECTION, SOLUTION INTRAVENOUS CONTINUOUS
Status: DISCONTINUED | OUTPATIENT
Start: 2018-07-31 | End: 2018-07-31 | Stop reason: HOSPADM

## 2018-07-31 RX ORDER — DEXMEDETOMIDINE HYDROCHLORIDE 4 UG/ML
INJECTION, SOLUTION INTRAVENOUS AS NEEDED
Status: DISCONTINUED | OUTPATIENT
Start: 2018-07-31 | End: 2018-07-31 | Stop reason: HOSPADM

## 2018-07-31 RX ORDER — PHENYLEPHRINE HCL IN 0.9% NACL 0.4MG/10ML
SYRINGE (ML) INTRAVENOUS AS NEEDED
Status: DISCONTINUED | OUTPATIENT
Start: 2018-07-31 | End: 2018-07-31 | Stop reason: HOSPADM

## 2018-07-31 RX ORDER — FENTANYL CITRATE 50 UG/ML
INJECTION, SOLUTION INTRAMUSCULAR; INTRAVENOUS AS NEEDED
Status: DISCONTINUED | OUTPATIENT
Start: 2018-07-31 | End: 2018-07-31 | Stop reason: HOSPADM

## 2018-07-31 RX ORDER — DEXTROSE, SODIUM CHLORIDE, SODIUM LACTATE, POTASSIUM CHLORIDE, AND CALCIUM CHLORIDE 5; .6; .31; .03; .02 G/100ML; G/100ML; G/100ML; G/100ML; G/100ML
25 INJECTION, SOLUTION INTRAVENOUS CONTINUOUS
Status: DISCONTINUED | OUTPATIENT
Start: 2018-07-31 | End: 2018-07-31 | Stop reason: HOSPADM

## 2018-07-31 RX ORDER — ACETAMINOPHEN 10 MG/ML
INJECTION, SOLUTION INTRAVENOUS AS NEEDED
Status: DISCONTINUED | OUTPATIENT
Start: 2018-07-31 | End: 2018-07-31 | Stop reason: HOSPADM

## 2018-07-31 RX ORDER — LIDOCAINE HYDROCHLORIDE 20 MG/ML
INJECTION, SOLUTION EPIDURAL; INFILTRATION; INTRACAUDAL; PERINEURAL AS NEEDED
Status: DISCONTINUED | OUTPATIENT
Start: 2018-07-31 | End: 2018-07-31 | Stop reason: HOSPADM

## 2018-07-31 RX ORDER — ONDANSETRON 4 MG/1
4 TABLET, ORALLY DISINTEGRATING ORAL
Qty: 5 TAB | Refills: 1 | Status: SHIPPED | OUTPATIENT
Start: 2018-07-31 | End: 2018-10-12 | Stop reason: ALTCHOICE

## 2018-07-31 RX ORDER — MIDAZOLAM HYDROCHLORIDE 1 MG/ML
0.5 INJECTION, SOLUTION INTRAMUSCULAR; INTRAVENOUS
Status: DISCONTINUED | OUTPATIENT
Start: 2018-07-31 | End: 2018-07-31 | Stop reason: HOSPADM

## 2018-07-31 RX ORDER — OXYCODONE HYDROCHLORIDE 5 MG/1
5 TABLET ORAL ONCE
Status: COMPLETED | OUTPATIENT
Start: 2018-07-31 | End: 2018-07-31

## 2018-07-31 RX ORDER — SODIUM CHLORIDE 0.9 % (FLUSH) 0.9 %
5-10 SYRINGE (ML) INJECTION AS NEEDED
Status: DISCONTINUED | OUTPATIENT
Start: 2018-07-31 | End: 2018-07-31 | Stop reason: HOSPADM

## 2018-07-31 RX ORDER — ONDANSETRON 2 MG/ML
INJECTION INTRAMUSCULAR; INTRAVENOUS AS NEEDED
Status: DISCONTINUED | OUTPATIENT
Start: 2018-07-31 | End: 2018-07-31 | Stop reason: HOSPADM

## 2018-07-31 RX ORDER — PROPOFOL 10 MG/ML
INJECTION, EMULSION INTRAVENOUS AS NEEDED
Status: DISCONTINUED | OUTPATIENT
Start: 2018-07-31 | End: 2018-07-31 | Stop reason: HOSPADM

## 2018-07-31 RX ORDER — DIPHENHYDRAMINE HYDROCHLORIDE 50 MG/ML
12.5 INJECTION, SOLUTION INTRAMUSCULAR; INTRAVENOUS AS NEEDED
Status: DISCONTINUED | OUTPATIENT
Start: 2018-07-31 | End: 2018-07-31 | Stop reason: HOSPADM

## 2018-07-31 RX ORDER — SODIUM CHLORIDE 0.9 % (FLUSH) 0.9 %
5-10 SYRINGE (ML) INJECTION EVERY 8 HOURS
Status: DISCONTINUED | OUTPATIENT
Start: 2018-07-31 | End: 2018-07-31 | Stop reason: HOSPADM

## 2018-07-31 RX ORDER — SODIUM CHLORIDE 9 MG/ML
1000 INJECTION, SOLUTION INTRAVENOUS CONTINUOUS
Status: DISCONTINUED | OUTPATIENT
Start: 2018-07-31 | End: 2018-07-31 | Stop reason: HOSPADM

## 2018-07-31 RX ORDER — DEXAMETHASONE SODIUM PHOSPHATE 4 MG/ML
INJECTION, SOLUTION INTRA-ARTICULAR; INTRALESIONAL; INTRAMUSCULAR; INTRAVENOUS; SOFT TISSUE AS NEEDED
Status: DISCONTINUED | OUTPATIENT
Start: 2018-07-31 | End: 2018-07-31 | Stop reason: HOSPADM

## 2018-07-31 RX ORDER — MIDAZOLAM HYDROCHLORIDE 1 MG/ML
INJECTION, SOLUTION INTRAMUSCULAR; INTRAVENOUS AS NEEDED
Status: DISCONTINUED | OUTPATIENT
Start: 2018-07-31 | End: 2018-07-31 | Stop reason: HOSPADM

## 2018-07-31 RX ORDER — CEFAZOLIN SODIUM IN 0.9 % NACL 2 G/100 ML
PLASTIC BAG, INJECTION (ML) INTRAVENOUS AS NEEDED
Status: DISCONTINUED | OUTPATIENT
Start: 2018-07-31 | End: 2018-07-31 | Stop reason: HOSPADM

## 2018-07-31 RX ORDER — LIDOCAINE HYDROCHLORIDE 10 MG/ML
0.1 INJECTION, SOLUTION EPIDURAL; INFILTRATION; INTRACAUDAL; PERINEURAL AS NEEDED
Status: DISCONTINUED | OUTPATIENT
Start: 2018-07-31 | End: 2018-07-31 | Stop reason: HOSPADM

## 2018-07-31 RX ORDER — ONDANSETRON 2 MG/ML
4 INJECTION INTRAMUSCULAR; INTRAVENOUS AS NEEDED
Status: DISCONTINUED | OUTPATIENT
Start: 2018-07-31 | End: 2018-07-31 | Stop reason: HOSPADM

## 2018-07-31 RX ADMIN — SODIUM CHLORIDE, SODIUM LACTATE, POTASSIUM CHLORIDE, AND CALCIUM CHLORIDE 25 ML/HR: 600; 310; 30; 20 INJECTION, SOLUTION INTRAVENOUS at 07:16

## 2018-07-31 RX ADMIN — MIDAZOLAM HYDROCHLORIDE 4 MG: 1 INJECTION, SOLUTION INTRAMUSCULAR; INTRAVENOUS at 07:27

## 2018-07-31 RX ADMIN — EPHEDRINE SULFATE 5 MG: 50 INJECTION, SOLUTION INTRAVENOUS at 07:50

## 2018-07-31 RX ADMIN — EPHEDRINE SULFATE 10 MG: 50 INJECTION, SOLUTION INTRAVENOUS at 08:33

## 2018-07-31 RX ADMIN — Medication 2 G: at 07:42

## 2018-07-31 RX ADMIN — FENTANYL CITRATE 50 MCG: 50 INJECTION, SOLUTION INTRAMUSCULAR; INTRAVENOUS at 07:33

## 2018-07-31 RX ADMIN — ONDANSETRON 4 MG: 2 INJECTION INTRAMUSCULAR; INTRAVENOUS at 07:45

## 2018-07-31 RX ADMIN — EPHEDRINE SULFATE 10 MG: 50 INJECTION, SOLUTION INTRAVENOUS at 08:25

## 2018-07-31 RX ADMIN — OXYCODONE HYDROCHLORIDE 5 MG: 5 TABLET ORAL at 11:18

## 2018-07-31 RX ADMIN — ACETAMINOPHEN 1000 MG: 10 INJECTION, SOLUTION INTRAVENOUS at 08:12

## 2018-07-31 RX ADMIN — DEXMEDETOMIDINE HYDROCHLORIDE 10 MCG: 4 INJECTION, SOLUTION INTRAVENOUS at 07:30

## 2018-07-31 RX ADMIN — FENTANYL CITRATE 50 MCG: 50 INJECTION, SOLUTION INTRAMUSCULAR; INTRAVENOUS at 08:35

## 2018-07-31 RX ADMIN — EPHEDRINE SULFATE 5 MG: 50 INJECTION, SOLUTION INTRAVENOUS at 08:02

## 2018-07-31 RX ADMIN — Medication 80 MCG: at 07:45

## 2018-07-31 RX ADMIN — LIDOCAINE HYDROCHLORIDE 60 MG: 20 INJECTION, SOLUTION EPIDURAL; INFILTRATION; INTRACAUDAL; PERINEURAL at 07:33

## 2018-07-31 RX ADMIN — PROPOFOL 200 MG: 10 INJECTION, EMULSION INTRAVENOUS at 07:33

## 2018-07-31 RX ADMIN — DEXMEDETOMIDINE HYDROCHLORIDE 10 MCG: 4 INJECTION, SOLUTION INTRAVENOUS at 08:32

## 2018-07-31 RX ADMIN — DEXAMETHASONE SODIUM PHOSPHATE 4 MG: 4 INJECTION, SOLUTION INTRA-ARTICULAR; INTRALESIONAL; INTRAMUSCULAR; INTRAVENOUS; SOFT TISSUE at 07:45

## 2018-07-31 RX ADMIN — EPHEDRINE SULFATE 5 MG: 50 INJECTION, SOLUTION INTRAVENOUS at 07:53

## 2018-07-31 RX ADMIN — EPHEDRINE SULFATE 5 MG: 50 INJECTION, SOLUTION INTRAVENOUS at 07:55

## 2018-07-31 NOTE — BRIEF OP NOTE
BRIEF OPERATIVE NOTE Date of Procedure: 7/31/2018 Preoperative Diagnosis: LEFT BREAST CANCER Postoperative Diagnosis: LEFT BREAST CANCER Procedure(s): LEFT BREAST NIPPLE DELAY, LEFT SENTINEL NODE BIOPSY(3p) Surgeon(s) and Role: Iftikhar Duarte MD - Primary Surgical Assistant: Gianna Rodriguez Surgical Staff: 
Circ-1: Esther Krueger RN Registered Nurse Assistant: Fdiencio Gomez RN Scrub RN-1: Colbert Dakin, RN Event Time In Incision Start 1380 Incision Close 2824 Anesthesia: General  
Estimated Blood Loss: 20 ml Specimens:  
ID Type Source Tests Collected by Time Destination 1 : left axillary sentinel node #1 Frozen Section Axillary  Jm Galicia MD 7/31/2018 5027 Pathology 2 : left axillary sentinel node #2 Frozen Section Axillary  Jm Galicia MD 7/31/2018 8135 Pathology 3 : back of left nipple Frozen Section Breast  Jm Galicia MD 7/31/2018 3164 Pathology 4 : Left axillary contents Fresh Axillary  Jm Galicia MD 7/31/2018 1441 Pathology Findings: 2 sln +, back of left nipple negative Complications: none Implants: * No implants in log * Dictated [id number]

## 2018-07-31 NOTE — INTERVAL H&P NOTE
H&P Update: 
Mathew Mesa was seen and examined. History and physical has been reviewed. The patient has been examined.  There have been no significant clinical changes since the completion of the originally dated History and Physical. 
 
Signed By: Reynaldo Alex MD   
 July 31, 2018 7:18 AM

## 2018-07-31 NOTE — ANESTHESIA POSTPROCEDURE EVALUATION
Post-Anesthesia Evaluation and Assessment Patient: Marla Calderón MRN: [id number]  SSN: [id number] YOB: 1975  Age: 43 y.o. Sex: female Cardiovascular Function/Vital Signs Visit Vitals  /57  Pulse 71  Temp 36.6 °C (97.9 °F)  Resp 19  
 Ht 5' 8\" (1.727 m)  Wt 66.2 kg (146 lb)  SpO2 100%  BMI 22.2 kg/m2 Patient is status post general anesthesia for Procedure(s): LEFT BREAST NIPPLE DELAY, LEFT SENTINEL NODE BIOPSY(3p). Nausea/Vomiting: None Postoperative hydration reviewed and adequate. Pain: 
Pain Scale 1: Visual (07/31/18 0931) Pain Intensity 1: 0 (07/31/18 0931) Managed Neurological Status:  
Neuro (WDL): Exceptions to WDL (07/31/18 0931) Neuro Neurologic State: Sleeping (07/31/18 0931) At baseline Mental Status and Level of Consciousness: Arousable Pulmonary Status:  
O2 Device: Nasal cannula (07/31/18 0934) Adequate oxygenation and airway patent Complications related to anesthesia: None Post-anesthesia assessment completed. No concerns Signed By: Rajan Cuevas MD   
 July 31, 2018

## 2018-07-31 NOTE — ANESTHESIA PREPROCEDURE EVALUATION
Anesthetic History No history of anesthetic complications Review of Systems / Medical History Patient summary reviewed, nursing notes reviewed and pertinent labs reviewed Pulmonary Within defined limits Neuro/Psych Within defined limits Cardiovascular Within defined limits GI/Hepatic/Renal 
Within defined limits GERD Endo/Other Within defined limits Hypothyroidism Other Findings Physical Exam 
 
Airway Mallampati: II 
TM Distance: > 6 cm Neck ROM: normal range of motion Mouth opening: Normal 
 
 Cardiovascular Regular rate and rhythm,  S1 and S2 normal,  no murmur, click, rub, or gallop Dental 
 
Dentition: Ival Homans Pulmonary Breath sounds clear to auscultation Abdominal 
GI exam deferred Other Findings Anesthetic Plan ASA: 2 Anesthesia type: general 
 
 
 
 
Induction: Intravenous Anesthetic plan and risks discussed with: Patient

## 2018-07-31 NOTE — H&P (VIEW-ONLY)
HISTORY OF PRESENT ILLNESS Marce Loredo is a 43 y.o. female. HPI  ESTABLISHED patient here for to discuss breast cancer surgery. She is here with her . Nipple delay surgery is scheduled for 7/31/18 and her LEFT breast skin and nipple sparing mastectomy with reconstrction is scheduled for 8/14/18. She is doing well. Still has some tenderness to her nipple from her biopsy. 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 Also lcis and papilloma on path. No family history of breast or ovarian cancer. Her mother had non-hodgkin's lymphoma. 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 Review of Systems All other systems reviewed and are negative. Physical Exam  
Pulmonary/Chest:  
Exam unchanged Nursing note and vitals reviewed. ASSESSMENT and PLAN 
  ICD-10-CM ICD-9-CM 1. Malignant neoplasm of lower-outer quadrant of left breast of female, estrogen receptor positive ([facility] C50.512 174.5   
 Z17.0 V86.0   
 
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 Also lcis and papilloma on path. She is here to discuss surgery and postoperative care. We discussed the procedures of left nipple delay, sln biopsy, skin and nipple sparing mastectomy on left. Risks include bleeding, bruising, scar, infection, need for more surgery and lymphedema. She understood and wished to proceed. Discussed referral to med onc. She has appointments with Dr. Adri Chou and Dr. Awilda Connolly Discussed genetic testing results of vus. Discussed postop wound care and activity restrictions. Total discussion time 30 minutes.

## 2018-07-31 NOTE — DISCHARGE INSTRUCTIONS
Discharge Instructions from Dr. Shaneka Parsons    · I will call you with the pathology results, typically within 1 week from today. · You may shower, but no hot tubs, swimming pools, or baths until your incision is healed. · No heavy lifting with the affected extremity (nothing greater than 5 pounds), and limit its use for the next 4-5 days. · You may use an ice pack for comfort for the next couple of days, but do not place ice directly on the skin. Rather, use a towel or clothing to serve as a barrier between skin and ice to prevent injury. · If I placed a drain, follow the drain instructions provided, especially as you keep a record of the drain output. · Follow medication instructions carefully. No aspirin, ibuprofen or aleve. · Wear surgical bra for 24 hours, then remove. Wear supportive bra at all times. · You will have bruising and swelling  · Watch for signs of infection as listed below. · Redness  · Swelling  · Drainage from the incision or from your nipple that appears infected  · Fever over 101.5 degrees for consecutive readings, or over 99.5 if you are currently undergoing chemotherapy. · Call our office (number is below) for a follow-up appointment. · If you have any problems, our phone number is [phone]      DISCHARGE SUMMARY from Nurse    PATIENT INSTRUCTIONS:    After general anesthesia or intravenous sedation, for 24 hours or while taking prescription Narcotics:  · Limit your activities  · Do not drive and operate hazardous machinery  · Do not make important personal or business decisions  · Do  not drink alcoholic beverages  · If you have not urinated within 8 hours after discharge, please contact your surgeon on call.     Report the following to your surgeon:  · Excessive pain, swelling, redness or odor of or around the surgical area  · Temperature over 100.5  · Nausea and vomiting lasting longer than 4 hours or if unable to take medications  · Any signs of decreased circulation or nerve impairment to extremity: change in color, persistent  numbness, tingling, coldness or increase pain  · Any questions    What to do at Home:  Recommended activity: Activity as tolerated and Activity as tolerated and no driving for today. If you experience any of the following symptoms as mentioned above, please follow up with Dr. Mitchell Mejia. *  Please give a list of your current medications to your Primary Care Provider. *  Please update this list whenever your medications are discontinued, doses are      changed, or new medications (including over-the-counter products) are added. *  Please carry medication information at all times in case of emergency situations. These are general instructions for a healthy lifestyle:    No smoking/ No tobacco products/ Avoid exposure to second hand smoke  Surgeon General's Warning:  Quitting smoking now greatly reduces serious risk to your health. Obesity, smoking, and sedentary lifestyle greatly increases your risk for illness    A healthy diet, regular physical exercise & weight monitoring are important for maintaining a healthy lifestyle    You may be retaining fluid if you have a history of heart failure or if you experience any of the following symptoms:  Weight gain of 3 pounds or more overnight or 5 pounds in a week, increased swelling in our hands or feet or shortness of breath while lying flat in bed. Please call your doctor as soon as you notice any of these symptoms; do not wait until your next office visit. Recognize signs and symptoms of STROKE:    F-face looks uneven    A-arms unable to move or move unevenly    S-speech slurred or non-existent    T-time-call 911 as soon as signs and symptoms begin-DO NOT go       Back to bed or wait to see if you get better-TIME IS BRAIN. Warning Signs of HEART ATTACK     Call 911 if you have these symptoms:   Chest discomfort.  Most heart attacks involve discomfort in the center of the chest that lasts more than a few minutes, or that goes away and comes back. It can feel like uncomfortable pressure, squeezing, fullness, or pain.  Discomfort in other areas of the upper body. Symptoms can include pain or discomfort in one or both arms, the back, neck, jaw, or stomach.  Shortness of breath with or without chest discomfort.  Other signs may include breaking out in a cold sweat, nausea, or lightheadedness. Don't wait more than five minutes to call 911 - MINUTES MATTER! Fast action can save your life. Calling 911 is almost always the fastest way to get lifesaving treatment. Emergency Medical Services staff can begin treatment when they arrive -- up to an hour sooner than if someone gets to the hospital by car. The discharge information has been reviewed with the patient and spouse. The patient and spouse verbalized understanding. Discharge medications reviewed with the patient and spouse and appropriate educational materials and side effects teaching were provided.   ___________________________________________________________________________________________________________________________________

## 2018-07-31 NOTE — OP NOTES
[address]  OPERATIVE REPORT    Khalida Chicas  MR#: [id number]  : 1975  ACCOUNT #: [de-identified]   DATE OF SERVICE: 2018    PREOPERATIVE DIAGNOSIS:  Left multicentric breast cancer. POSTOPERATIVE DIAGNOSIS:  Left multicentric breast cancer, left axillary metastases. PROCEDURE PERFORMED:  Left breast nipple delay, back of left nipple biopsy, left sentinel node biopsy and left axillary completion lymphadenectomy. SURGEON:  Katya Gonzales MD    ASSISTANT:  Olesya Cruz    ANESTHESIA:  General.    ESTIMATED BLOOD LOSS:  20 mL. SPECIMENS REMOVED:  1. Left axillary sentinel node #1.  2.  Left axillary sentinel node #2.  3.  Back of left nipple. 4.  Left axillary contents. FINDINGS:  Two sentinel lymph nodes positive, back of left nipple negative. COMPLICATIONS:  None. IMPLANTS:  No implants. DRAINS:  A 15-Greek round HUA. INDICATION FOR PROCEDURE:  This is a 41-year-old female with multicentric lobular breast cancer. She had a preoperative MRI with no adenopathy, nothing on the opposite breast.  She was evaluated by Plastic Surgery where the decision was made to do a nipple delay back of nipple biopsy and sentinel lymph node biopsy in preparation for definitive mastectomy. She had seen medical oncology prior to surgery and was scheduled for the procedure of left nipple delay back of left nipple biopsy and sentinel lymph node biopsy. PROCEDURE IN DETAIL:  The patient initially went to nuclear medicine where technetium 99 was injected in the left breast.  She tolerated this well. She went to the preop holding area where surgical site was marked by surgeon. Informed consent was obtained. She was taken to the operating room and laid in supine position where general endotracheal anesthesia was induced. Left breast and axilla were prepped and draped in the usual fashion. A timeout was performed.   Attention was turned first to the left axilla, an inferior axillary hairline incision was made with a 10 blade. Neoprobe guidance was used to identify 2 sentinel lymph nodes and excised with the LigaSure and sent for frozen section. The incision was closed with interrupted 3-0 Vicryl and 4-0 subcuticular Monocryl stitch. Attention was turned to the left breast and an inframammary incision was made 1 cm above the inframammary fold. This was done with a 10 blade. Bovie cautery was used to dissect superiorly back behind the nipple. A back of the left nipple biopsy was taken with a 15 blade and found to be negative. Bovie cautery was used to dissect skin flaps 6 cm circumferentially around the nipple areolar complex. The cavity was irrigated. Thirty mL of Exparel was injected in the breast tissue incision and this was closed with running 3-0 Vicryl and a running 4-0 subcuticular Monocryl. The 2 lymph nodes that were sent for permanent frozen pathology were positive for carcinoma. Her axillary incision was opened back up and a completion lymphadenectomy was performed. The axillary vein and thoracodorsal bundle and long thoracic nerve were identified and preserved. Intercostal brachial nerve was identified and preserved. The contents were removed using a LigaSure device and sent for permanent pathology. The cavity was irrigated. Ten mL of 0.5% Marcaine was injected also here in the skin and the pectoral muscles. Prior to this, right after sentinel lymph node biopsy, she had 20 mL of Exparel with 0.25% Marcaine injected into the area as well. The axillary fascia was closed with interrupted 3-0 Vicryl. A 15 round Faraz drain was inserted into the cavity and secured with a 3-0 Prolene. The skin was closed with running 4-0 Monocryl. Dermabond was placed on the incisions, as well as a pressure dressing in the bra. All sponge and instrument counts were correct. The patient went to recovery in stable condition.       MD FERMIN Meyer / JANN  D: 07/31/2018 10:08     T: 07/31/2018 11:07  JOB #: [id number]

## 2018-07-31 NOTE — IP AVS SNAPSHOT
1796 [facility] [address] 
[phone] Patient: Ne Serrano MRN: [id number] :1975 About your hospitalization You were admitted on:  2018 You last received care in the:  [facility] ASU PACU You were discharged on:  2018 Why you were hospitalized Your primary diagnosis was:  Not on File Follow-up Information Follow up With Details Comments Contact Info Victoria Ramsey MD   [facility] 5242 [facility] 
[phone] Wen Cardenas MD Schedule an appointment as soon as possible for a visit in 1 week(s)  [address] [facility] 
[phone] Your Scheduled Appointments 2018 11:00 AM EDT New Patient with Carlton Sarkar MD  
[facility] ZEE Oncology at [address]) 217 [facility] [address] [facility] [address]  
[phone] 2018  9:45 AM EDT  
POST OP with Wen Cardenas MD  
[address] at [facility] [address] [facility] [address] [facility] [address]  
[phone] 2018 BREAST MASTECTOMY MODIFIED RADICAL, BREAST RECONSTRUCTION with Wen Cardenas MD, Lisa Ybarra MD  
[facility] 7 AMB SURGERY UNIT (RI OR PRE ASSESSMENT) [address]  
[phone] 2018 11:30 AM EDT SURGERY with Wen Cardenas MD  
[facility] ([address] [facility]) [facility] 1 [address] [facility]  
[phone] Discharge Orders None A check rosanna indicates which time of day the medication should be taken. My Medications START taking these medications Instructions Each Dose to Equal  
 Morning Noon Evening Bedtime  
 ondansetron 4 mg disintegrating tablet Commonly known as:  ZOFRAN ODT Your last dose was: Your next dose is: Take 1 Tab by mouth every eight (8) hours as needed for Nausea. 4 mg CONTINUE taking these medications Instructions Each Dose to Equal  
 Morning Noon Evening Bedtime  
 levothyroxine 125 mcg tablet Commonly known as:  SYNTHROID Your last dose was: Your next dose is: Take 137 mcg by mouth Daily (before breakfast). PT TAKES AT NIGHT  Indications: hypothyroidism 137 mcg  
    
   
   
   
  
 oxyCODONE-acetaminophen 5-325 mg per tablet Commonly known as:  PERCOCET Your last dose was: Your next dose is: Take 1 Tab by mouth every four (4) hours as needed for Pain. Max Daily Amount: 6 Tabs. 1 Tab STOP taking these medications TYLENOL 325 mg tablet Generic drug:  acetaminophen Where to Get Your Medications Information on where to get these meds will be given to you by the nurse or doctor. ! Ask your nurse or doctor about these medications  
  ondansetron 4 mg disintegrating tablet Opioid Education Prescription Opioids: What You Need to Know: 
 
Prescription opioids can be used to help relieve moderate-to-severe pain and are often prescribed following a surgery or injury, or for certain health conditions. These medications can be an important part of treatment but also come with serious risks. Opioids are strong pain medicines. Examples include hydrocodone, oxycodone, fentanyl, and morphine. Heroin is an example of an illegal opioid. It is important to work with your health care provider to make sure you are getting the safest, most effective care. WHAT ARE THE RISKS AND SIDE EFFECTS OF OPIOID USE? Prescription opioids carry serious risks of addiction and overdose, especially with prolonged use.   An opioid overdose, often marked by slow breathing, can cause sudden death. The use of prescription opioids can have a number of side effects as well, even when taken as directed. · Tolerance-meaning you might need to take more of a medication for the same pain relief · Physical dependence-meaning you have symptoms of withdrawal when the medication is stopped. Withdrawal symptoms can include nausea, sweating, chills, diarrhea, stomach cramps, and muscle aches. Withdrawal can last up to several weeks, depending on which drug you took and how long you took it. · Increased sensitivity to pain · Constipation · Nausea, vomiting, and dry mouth · Sleepiness and dizziness · Confusion · Depression · Low levels of testosterone that can result in lower sex drive, energy, and strength · Itching and sweating RISKS ARE GREATER WITH:      
· History of drug misuse, substance use disorder, or overdose · Mental health conditions (such as depression or anxiety) · Sleep apnea · Older age (72 years or older) · Pregnancy Avoid alcohol while taking prescription opioids. Also, unless specifically advised by your health care provider, medications to avoid include: · Benzodiazepines (such as Xanax or Valium) · Muscle relaxants (such as Soma or Flexeril) · Hypnotics (such as Ambien or Lunesta) · Other prescription opioids KNOW YOUR OPTIONS Talk to your health care provider about ways to manage your pain that don't involve prescription opioids. Some of these options may actually work better and have fewer risks and side effects. Options may include: 
· Pain relievers such as acetaminophen, ibuprofen, and naproxen · Some medications that are also used for depression or seizures · Physical therapy and exercise · Counseling to help patients learn how to cope better with triggers of pain and stress. · Application of heat or cold compress · Massage therapy · Relaxation techniques Be Informed Make sure you know the name of your medication, how much and how often to take it, and its potential risks & side effects. IF YOU ARE PRESCRIBED OPIOIDS FOR PAIN: 
· Never take opioids in greater amounts or more often than prescribed. Remember the goal is not to be pain-free but to manage your pain at a tolerable level. · Follow up with your primary care provider to: · Work together to create a plan on how to manage your pain. · Talk about ways to help manage your pain that don't involve prescription opioids. · Talk about any and all concerns and side effects. · Help prevent misuse and abuse. · Never sell or share prescription opioids · Help prevent misuse and abuse. · Store prescription opioids in a secure place and out of reach of others (this may include visitors, children, friends, and family). · Safely dispose of unused/unwanted prescription opioids: Find your community drug take-back program or your pharmacy mail-back program, or flush them down the toilet, following guidance from the Food and Drug Administration (www.fda.gov/Drugs/ResourcesForYou). · Visit www.cdc.gov/drugoverdose to learn about the risks of opioid abuse and overdose. · If you believe you may be struggling with addiction, tell your health care provider and ask for guidance or call [address] [place], [place] [address] at [phone]. Discharge Instructions Discharge Instructions from Dr. Yenny Santoro · I will call you with the pathology results, typically within 1 week from today. · You may shower, but no hot tubs, swimming pools, or baths until your incision is healed. · No heavy lifting with the affected extremity (nothing greater than 5 pounds), and limit its use for the next 4-5 days. · You may use an ice pack for comfort for the next couple of days, but do not place ice directly on the skin.   Rather, use a towel or clothing to serve as a barrier between skin and ice to prevent injury. · If I placed a drain, follow the drain instructions provided, especially as you keep a record of the drain output. · Follow medication instructions carefully. No aspirin, ibuprofen or aleve. · Wear surgical bra for 24 hours, then remove. Wear supportive bra at all times. · You will have bruising and swelling · Watch for signs of infection as listed below. · Redness · Swelling · Drainage from the incision or from your nipple that appears infected · Fever over 101.5 degrees for consecutive readings, or over 99.5 if you are currently undergoing chemotherapy. · Call our office (number is below) for a follow-up appointment. · If you have any problems, our phone number is [phone] DISCHARGE SUMMARY from Nurse PATIENT INSTRUCTIONS: 
 
 
F-face looks uneven A-arms unable to move or move unevenly S-speech slurred or non-existent T-time-call 911 as soon as signs and symptoms begin-DO NOT go Back to bed or wait to see if you get better-TIME IS BRAIN. Warning Signs of HEART ATTACK Call 911 if you have these symptoms: 
? Chest discomfort. Most heart attacks involve discomfort in the center of the chest that lasts more than a few minutes, or that goes away and comes back. It can feel like uncomfortable pressure, squeezing, fullness, or pain. ? Discomfort in other areas of the upper body. Symptoms can include pain or discomfort in one or both arms, the back, neck, jaw, or stomach. ? Shortness of breath with or without chest discomfort. ? Other signs may include breaking out in a cold sweat, nausea, or lightheadedness. Don't wait more than five minutes to call [address]! Fast action can save your life. Calling 911 is almost always the fastest way to get lifesaving treatment. Emergency Medical Services staff can begin treatment when they arrive  up to an hour sooner than if someone gets to the hospital by car. The discharge information has been reviewed with the patient and spouse. The patient and spouse verbalized understanding. Discharge medications reviewed with the patient and spouse and appropriate educational materials and side effects teaching were provided. ___________________________________________________________________________________________________________________________________ Introducing [facility] & HEALTH SERVICES! Dear Te Coon: 
Thank you for requesting a ImmuRx account. Our records indicate that you already have an active ImmuRx account. You can access your account anytime at [email or web address] Did you know that you can access your hospital and ER discharge instructions at any time in ImmuRx? You can also review all of your test results from your hospital stay or ER visit. Additional Information If you have questions, please visit the Frequently Asked Questions section of the ImmuRx website at [email or web address] Remember, ImmuRx is NOT to be used for urgent needs. For medical emergencies, dial 911. Now available from your iPhone and Android! Introducing Anatoly Su As a Vamsi Quarry patient, I wanted to make you aware of our electronic visit tool called Anatoly Su. Vamsisung Thomas[employer] 24/7 allows you to connect within minutes with a medical provider 24 hours a day, seven days a week via a mobile device or tablet or logging into a secure website from your computer. You can access Anatoly Su from anywhere in the [place].  
 
A virtual visit might be right for you when you have a simple condition and feel like you just dont want to get out of bed, or cant get away from work for an appointment, when your regular [employer] provider is not available (evenings, weekends or holidays), or when youre out of town and need minor care. Electronic visits cost only $49 and if the [employer] 24/7 provider determines a prescription is needed to treat your condition, one can be electronically transmitted to a nearby pharmacy*. Please take a moment to enroll today if you have not already done so. The enrollment process is free and takes just a few minutes. To enroll, please download the [employer] 24/7 bettie to your tablet or phone, or visit [email or web address] to enroll on your computer. And, as an [address] [place] [address] patient with a RegisterPatient account, the results of your visits will be scanned into your electronic medical record and your primary care provider will be able to view the scanned results. We urge you to continue to see your regular [employer] provider for your ongoing medical care. And while your primary care provider may not be the one available when you seek a Victrixmaryfin virtual visit, the peace of mind you get from getting a real diagnosis real time can be priceless. For more information on Scarosso, view our Frequently Asked Questions (FAQs) at [email or web address]. Sincerely, 
 
Mai Car MD 
Chief Medical Officer Jordy Martines *:  certain medications cannot be prescribed via Scarosso Providers Seen During Your Hospitalization Provider Specialty Primary office phone Joshua Horne MD Breast Surgery [phone] Your Primary Care Physician (PCP) Primary Care Physician Office Phone Office Fax Vero Bo [phone] You are allergic to the following No active allergies Recent Documentation Height Weight BMI OB Status Smoking Status 1.727 m 66.2 kg 22.2 kg/m2 Having regular periods Former Smoker Emergency Contacts Name Discharge Info Relation Home Work Mobile Devonte Villasenor DISCHARGE CAREGIVER [3] Spouse [3]   [phone] Patient Belongings The following personal items are in your possession at time of discharge: 
  Dental Appliances:  (bridge)         Home Medications: None   Jewelry: None  Clothing:  (clothes in locker)    Other Valuables: None Please provide this summary of care documentation to your next provider. Signatures-by signing, you are acknowledging that this After Visit Summary has been reviewed with you and you have received a copy. Patient Signature:  ____________________________________________________________ Date:  ____________________________________________________________  
  
Oliva Sleight Provider Signature:  ____________________________________________________________ Date:  ____________________________________________________________

## 2018-07-31 NOTE — ROUTINE PROCESS
Patient: Mannie Perez MRN: [id number]  SSN: [id number] YOB: 1975  Age: 43 y.o. Sex: female Patient is status post Procedure(s): LEFT BREAST NIPPLE DELAY, LEFT SENTINEL NODE BIOPSY(3p). Surgeon(s) and Role: Peña Collier MD - Primary Local/Dose/Irrigation:   
 
             
Peripheral IV 07/31/18 Right Hand (Active) Site Assessment Clean, dry, & intact 7/31/2018  7:14 AM  
Phlebitis Assessment 0 7/31/2018  7:14 AM  
Infiltration Assessment 0 7/31/2018  7:14 AM  
Dressing Status Clean, dry, & intact 7/31/2018  7:14 AM  
Dressing Type Transparent 7/31/2018  7:14 AM  
Hub Color/Line Status Blue 7/31/2018  7:14 AM  
      
Oliverio-Mitchell Drain 07/31/18 Left Chest (Active) Dressing/Packing:  Wound Breast Left-DRESSING TYPE:  (dermabond 4x4 abd, postop bra) (07/31/18 0700) Splint/Cast:  ] Other:

## 2018-08-02 DIAGNOSIS — C50.512 MALIGNANT NEOPLASM OF LOWER-OUTER QUADRANT OF LEFT BREAST OF FEMALE, ESTROGEN RECEPTOR POSITIVE (HCC): Primary | ICD-10-CM

## 2018-08-02 DIAGNOSIS — Z17.0 MALIGNANT NEOPLASM OF LOWER-OUTER QUADRANT OF LEFT BREAST OF FEMALE, ESTROGEN RECEPTOR POSITIVE (HCC): Primary | ICD-10-CM

## 2018-08-07 ENCOUNTER — HOSPITAL ENCOUNTER (OUTPATIENT)
Dept: RADIATION THERAPY | Age: 43
Discharge: HOME | End: 2018-08-07

## 2018-08-07 ENCOUNTER — OFFICE VISIT (OUTPATIENT)
Dept: ONCOLOGY | Age: 43
End: 2018-08-07

## 2018-08-07 ENCOUNTER — TELEPHONE (OUTPATIENT)
Dept: SURGERY | Age: 43
End: 2018-08-07

## 2018-08-07 ENCOUNTER — PATIENT OUTREACH (OUTPATIENT)
Dept: ONCOLOGY | Age: 43
End: 2018-08-07

## 2018-08-07 ENCOUNTER — OFFICE VISIT (OUTPATIENT)
Dept: SURGERY | Age: 43
End: 2018-08-07

## 2018-08-07 VITALS
WEIGHT: 146 LBS | BODY MASS INDEX: 22.13 KG/M2 | HEART RATE: 76 BPM | SYSTOLIC BLOOD PRESSURE: 122 MMHG | HEIGHT: 68 IN | DIASTOLIC BLOOD PRESSURE: 74 MMHG

## 2018-08-07 VITALS
HEIGHT: 68 IN | SYSTOLIC BLOOD PRESSURE: 108 MMHG | WEIGHT: 150 LBS | BODY MASS INDEX: 22.73 KG/M2 | HEART RATE: 70 BPM | OXYGEN SATURATION: 98 % | DIASTOLIC BLOOD PRESSURE: 70 MMHG | TEMPERATURE: 98.9 F | RESPIRATION RATE: 20 BRPM

## 2018-08-07 DIAGNOSIS — C50.512 MALIGNANT NEOPLASM OF LOWER-OUTER QUADRANT OF LEFT BREAST OF FEMALE, ESTROGEN RECEPTOR POSITIVE (HCC): Primary | ICD-10-CM

## 2018-08-07 DIAGNOSIS — Z17.0 MALIGNANT NEOPLASM OF LOWER-OUTER QUADRANT OF LEFT BREAST OF FEMALE, ESTROGEN RECEPTOR POSITIVE (HCC): Primary | ICD-10-CM

## 2018-08-07 DIAGNOSIS — E03.9 HYPOTHYROIDISM, UNSPECIFIED TYPE: ICD-10-CM

## 2018-08-07 RX ORDER — ACETAMINOPHEN 325 MG/1
TABLET ORAL
COMMUNITY
End: 2018-08-15

## 2018-08-07 RX ORDER — LEVOTHYROXINE SODIUM 137 UG/1
TABLET ORAL
COMMUNITY
Start: 2018-06-07 | End: 2018-08-20 | Stop reason: ALTCHOICE

## 2018-08-07 RX ORDER — NITROGLYCERIN 20 MG/G
OINTMENT TOPICAL
COMMUNITY
Start: 2018-08-06 | End: 2018-10-12 | Stop reason: ALTCHOICE

## 2018-08-07 NOTE — TELEPHONE ENCOUNTER
Please call patient as she has questions about logistics of surgery. She saw PAT prior to last surgery and they answered questions about cleaning surgery site, NPO times, if additional testing was needed.

## 2018-08-07 NOTE — MR AVS SNAPSHOT
2700 [facility] G11 [address] [place] [address] 
[phone] Patient: Mannie Perez MRN: [id number] :1975 Visit Information Date & Time Provider Department Dept. Phone Encounter #  
 2018  9:45 AM Victoria Handley NP [address] at [facility] [id number] Your Appointments 2018 11:30 AM  
SURGERY with 815 MD Winifred Cardenas [address] [place] [address]) Appt Note: [facility]-  LEFT BREAST SKIN AND NIPPLE SPARING MASTECTOMY,  RECON WITH DR Eneida Rome  
 8220 705 [facility] 1 [address] [address] [address]74 [address] Upcoming Health Maintenance Date Due Pneumococcal 19-64 Highest Risk (1 of 3 - PCV13) 10/25/1994 DTaP/Tdap/Td series (1 - Tdap) 10/25/1996 PAP AKA CERVICAL CYTOLOGY 10/25/1996 Influenza Age 5 to Adult 2018 Allergies as of 2018  Review Complete On: 2018 By: Lennie Hernandez No Known Allergies Current Immunizations  Never Reviewed No immunizations on file. Not reviewed this visit You Were Diagnosed With   
  
 Codes Comments Malignant neoplasm of lower-outer quadrant of left breast of female, estrogen receptor positive ([facility] [address]    -  Primary ICD-10-CM: C50.512, Z17.0 ICD-9-CM: 174.5, V86.0 Vitals BP Pulse Height(growth percentile) Weight(growth percentile) LMP BMI  
 122/74 76 5' 8\" (1.727 m) 146 lb (66.2 kg) 2018 22.2 kg/m2 OB Status Smoking Status Having regular periods Former Smoker Vitals History BMI and BSA Data Body Mass Index Body Surface Area  
 22.2 kg/m 2 1.78 m 2 Preferred Pharmacy Pharmacy Name Phone N[facility] [address] , [address] [place] [address]. [phone] Your Updated Medication List  
  
   
 This list is accurate as of 8/7/18 12:09 PM.  Always use your most recent med list.  
  
  
  
  
 * levothyroxine 125 mcg tablet Commonly known as:  SYNTHROID Take 137 mcg by mouth Daily (before breakfast). PT TAKES AT NIGHT  Indications: hypothyroidism * SYNTHROID 137 mcg tablet Generic drug:  levothyroxine NITRO-BID 2 % ointment Generic drug:  nitroglycerin  
  
 ondansetron 4 mg disintegrating tablet Commonly known as:  ZOFRAN ODT Take 1 Tab by mouth every eight (8) hours as needed for Nausea. oxyCODONE-acetaminophen 5-325 mg per tablet Commonly known as:  PERCOCET Take 1 Tab by mouth every four (4) hours as needed for Pain. Max Daily Amount: 6 Tabs. TYLENOL 325 mg tablet Generic drug:  acetaminophen Take  by mouth every four (4) hours as needed for Pain. * Notice: This list has 2 medication(s) that are the same as other medications prescribed for you. Read the directions carefully, and ask your doctor or other care provider to review them with you. To-Do List   
 08/07/2018 1:00 PM  
  Appointment with [facility] at [address] [place] [address] ([phone]) Patient Instructions Breast Cancer: Care Instructions Your Care Instructions Breast cancer occurs when abnormal cells grow out of control in the breast. These cancer cells can spread within the breast, to nearby lymph nodes and other tissues, and to other parts of the body. Being treated for cancer can weaken your body, and you may feel very tired. Get the rest your body needs so you can feel better. Finding out that you have cancer is scary. You may feel many emotions and may need some help coping. Seek out family, friends, and counselors for support. You also can do things at home to make yourself feel better while you go through treatment. Call the [facility] ([phone]) or visit its website at [address] [employer] for more information. Follow-up care is a key part of your treatment and safety. Be sure to make and go to all appointments, and call your doctor if you are having problems. It's also a good idea to know your test results and keep a list of the medicines you take. How can you care for yourself at home? · Take your medicines exactly as prescribed. Call your doctor if you think you are having a problem with your medicine. You may get medicine for nausea and vomiting if you have these side effects. · Follow your doctor's instructions to relieve pain. Pain from cancer and surgery can almost always be controlled. Use pain medicine when you first notice pain, before it becomes severe. · Eat healthy food. If you do not feel like eating, try to eat food that has protein and extra calories to keep up your strength and prevent weight loss. Drink liquid meal replacements for extra calories and protein. Try to eat your main meal early. · Get some physical activity every day, but do not get too tired. Keep doing the hobbies you enjoy as your energy allows. · Do not smoke. Smoking can make your cancer worse. If you need help quitting, talk to your doctor about stop-smoking programs and medicines. These can increase your chances of quitting for good. · Take steps to control your stress and workload. Learn relaxation techniques. ¨ Share your feelings. Stress and tension affect our emotions. By expressing your feelings to others, you may be able to understand and cope with them. ¨ Consider joining a support group. Talking about a problem with your spouse, a good friend, or other people with similar problems is a good way to reduce tension and stress. ¨ Express yourself through art. Try writing, crafts, dance, or art to relieve stress. Some dance, writing, or art groups may be available just for people who have cancer. ¨ Be kind to your body and mind.  Getting enough sleep, eating a healthy diet, and taking time to do things you enjoy can contribute to an overall feeling of balance in your life and can help reduce stress. ¨ Get help if you need it. Discuss your concerns with your doctor or counselor. · If you are vomiting or have diarrhea: ¨ Drink plenty of fluids (enough so that your urine is light yellow or clear like water) to prevent dehydration. Choose water and other caffeine-free clear liquids. If you have kidney, heart, or liver disease and have to limit fluids, talk with your doctor before you increase the amount of fluids you drink. ¨ When you are able to eat, try clear soups, mild foods, and liquids until all symptoms are gone for 12 to 48 hours. Other good choices include dry toast, crackers, cooked cereal, and gelatin dessert, such as Jell-O. · If you have not already done so, prepare a list of advance directives. Advance directives are instructions to your doctor and family members about what kind of care you want if you become unable to speak or express yourself. When should you call for help? Call 911 anytime you think you may need emergency care. For example, call if: 
  · You passed out (lost consciousness).  
 Call your doctor now or seek immediate medical care if: 
  · You have a fever.  
  · You have abnormal bleeding.  
  · You think you have an infection.  
  · You have new or worse pain.  
  · You have new symptoms, such as a cough, belly pain, vomiting, diarrhea, or a rash.  
 Watch closely for changes in your health, and be sure to contact your doctor if: 
  · You are much more tired than usual.  
  · You have swollen glands in your armpits, groin, or neck.  
  · You do not get better as expected. Where can you learn more? Go to http://cesar-tracee.info/. Enter V321 in the search box to learn more about \"Breast Cancer: Care Instructions. \" Current as of: May 12, 2017 Content Version: 11.7 © 8955-9381 Healthwise, Incorporated. Care instructions adapted under license by [employer] (which disclaims liability or warranty for this information). If you have questions about a medical condition or this instruction, always ask your healthcare professional. [address] any warranty or liability for your use of this information. Introducing [facility] & HEALTH SERVICES! Dear Julian June: 
Thank you for requesting a VCV account. Our records indicate that you already have an active VCV account. You can access your account anytime at [email or web address] Did you know that you can access your hospital and ER discharge instructions at any time in VCV? You can also review all of your test results from your hospital stay or ER visit. Additional Information If you have questions, please visit the Frequently Asked Questions section of the VCV website at [email or web address] Remember, VCV is NOT to be used for urgent needs. For medical emergencies, dial 911. Now available from your iPhone and Android! Please provide this summary of care documentation to your next provider. Your primary care clinician is listed as Heather Cruz. If you have any questions after today's visit, please call [phone].

## 2018-08-07 NOTE — PROGRESS NOTES
HISTORY OF PRESENT ILLNESS  Kathy Crenshaw is a 43 y.o. female. HPI  ESTABLISHED patient here for post op follow up LEFT breast cancer, s/p LEFT breast nipple delay and ALND. She is doing well. Having some pain, 5/10 to her axilla. HUA drain put out 7 cc yesterday. Nipple is brown but lightening everyday. She is followed by Dr. Osei Prior and sent her a picture yesterday. She was advised by Dr. Osei Prior to decrease the application of NTG paste to twice a day. 42 yo female with left breast T2 N0  invasive carcinoma with lobular features, grade 2, ER positive, AK positive, HER 2 negative. Also lcis and papilloma on path. 7/31/18 - nipple delay and ALND, +4/20 LN, Dr. Kaela Harmon and Dr. Ying Cleveland breast skin and nipple sparing mastectomy with reconstrction is scheduled for 8/14/18. Port will be placed at time of mastectomy. Scheduled to see Dr. Wilber Sorensen today at 6 am and Dr. Safia Dhillon today at 1 pm.          No family history of breast or ovarian cancer. Her mother had non-hodgkin's lymphoma. 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   ROS    Physical Exam    ASSESSMENT and PLAN  {ASSESSMENT/PLAN:75165}

## 2018-08-07 NOTE — PROGRESS NOTES
HISTORY OF PRESENT ILLNESS  Cassie Bergeron is a 43 y.o. female. Breast Cancer     ESTABLISHED patient here for post op follow up LEFT breast cancer, s/p LEFT breast nipple delay and ALND. She is doing well. Having some pain, 5/10 to her axilla. HUA drain put out 7 cc yesterday. Nipple is brown but lightening everyday. She is followed by Dr. Romy Sow and sent her a picture yesterday. She was advised by Dr. Romy Sow to decrease the application of NTG paste to twice a day. 42 yo female with left breast T2 N0  invasive carcinoma with lobular features, grade 2, ER positive, OR positive, HER 2 negative. Also lcis and papilloma on path. 7/31/18 - nipple delay and ALND, +4/20 LN, Dr. Marveen Homans and Dr. Joseph Lara breast skin and nipple sparing mastectomy with reconstrction is scheduled for 8/14/18. Port will be placed at time of mastectomy. Scheduled to see Dr. Tiffany Alvarez today at 6 am and Dr. Ady Amaya today at 1 pm.       No family history of breast or ovarian cancer. Her mother had non-hodgkin's lymphoma. 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 ROS    Physical Exam   Pulmonary/Chest:         HUA drain removed without difficulty. Visit Vitals    /74    Pulse 76    Ht 5' 8\" (1.727 m)    Wt 146 lb (66.2 kg)    BMI 22.2 kg/m2     ASSESSMENT and PLAN  Encounter Diagnoses   Name Primary?  Malignant neoplasm of lower-outer quadrant of left breast of female, estrogen receptor positive ([address] Yes     Nipple delay - healing well with no signs of necrosis. HUA drain removed without difficulty. Reviewed wound care and activity. Med onc and ran onc appointments today. Surgery scheduled with Dr. Marveen Homans. Patient had questions about pre-op testing, NPO, cleansing, etc.  Will have surgery scheduler call her to follow-up.

## 2018-08-07 NOTE — PROGRESS NOTES
[id number] [facility] Oncology at [facility]   Nurse Navigator Note      Patient Name: Tacho Chris  YOB: 1975  Advance Care Planning  Advance Care Planning 7/23/2018   Patient's Healthcare Decision Maker is: Legal Next of Kin   Primary Decision Maker Name Celso Wei   Primary Decision Maker Phone Number [phone]   Primary Decision Maker Relationship to Patient Spouse   Confirm Advance Directive None   Patient Would Like to Complete Advance Directive Yes   Does the patient have other document types (No Data)         Patient seen in consultation with Dr. Oli Jenkins. Patient's spouse is present. She does not work outside of the home. They have 2 children---ages 5 and 8[address] Dr. Oli Jenkins reviewed patient's history, pathology, stage, and recommended plan. Patient and spouse asked appropriate questions and verbalized understanding of answers.       Oncology Navigator  Psychosocial Assessment    Reason for Assessment:    []Depression  []Anxiety  []Caregiver [place]  []Maladaptive Coping with Serious Illness   [x]Other:Recently diagnosed      Sources of Information:    [x]Patient  [x]Family  []Staff  []Medical Record    Mental Status:    [x]Alert  []Lethargic  []Unresponsive  Oriented to:  [x]Person  [x]Place  [x]Time  [x]Situation      Barriers to Learning:    []Language  []Developmental  []Cognitive  []Altered Mental Status  []Visual/Hearing Impairment  []Unable to Read/Write  []Motivational   [x]No Barriers Identified  []Other:    Relationship Status:  []Single  [x]  []Significant Other/Life Partner  []  []  []      Living Circumstances:  []Lives Alone  [x]Family/Significant Other in Household  []Roommates  [x]Children in the Home  []Paid Caregivers  []Assisted Living Facility/Group Home  []Skilled [address]  []Homeless  []Incarcerated  []Environmental/Care Concerns  []Other:    Support System:    [x]Strong  []Fair  []Limited    Financial/Legal Concerns: []Uninsured  []Limited Income/Resources  []Non-Citizen  [x]No Concerns Identified  []Financial POA:    []Other:    Scientologist/Spiritual/Existential:  []Strong Sense of Spirituality  []Involved in [address]  []Request  Visit  []Expressing Nestor Cadet  [x]No Concerns Identified    Coping with Illness:         Patient: Family/Caregiver:   Understanding and Acceptance of Illness/Prognosis  [] []   Strong Sense of Resilience [] []   Self Reflection [] []   Engaged Support System [x] []   Does not Readily Discuss Illness [] []   Denial of Terminal Status [] []   Anger [] []   Depression [] []   Anxiety/Fear [] []   Bargaining [] []   Recent Diagnosis/Prognosis [x] []   Difficulties with Body Image [] []   Loss of Identity [] []   Excessive Substance Use [] []   Mental Health History [] []   Enmeshed Relationships [] []   History of Loss [] []   Anticipatory Grief [] []   Concern for Complicated Grief [] []   Suicidal Ideation or Plan [] []   Unable to assess [] []       Referrals:     I. Transportation    Medicaid ([employer]) []   ACS Road to Recovery []                                    Regional organization  []                                      Financial Assistance/Medication Access    Patient assistance program (Care Card) []   Co-pay assistance  []                                    Leukemia & Lymphoma Society []   [address]  []   [address] []   CancerCare  []     Emotional support    Peer support group []   Local counseling [x]                                    Online support group [x]   Coordination of psychiatry consult []       Actions/Plan:     NN introduced self and role. Contact information provided. Described integrative supports in CRC/BSCI available to patient and loved ones, including massage, yoga, support groups, counseling, art and music therapies and relaxation/meditation therapy.  Reviewed benefits of MyChart account and encouraged use. Patient is aware of the services and support provided by [facility]---she has had a massage there. NN reviewed process to schedule imaging. They would like to have these scheduled by Friday this week as her surgery is Tuesday. NN to help facilitate. NN reviewed normal process for OPIC day  NN will provide online resources for support and information in AVS notes for review in [facility]. Patient verbalized understanding of above and agrees to contact us with questions or concerns.

## 2018-08-07 NOTE — TELEPHONE ENCOUNTER
SPOKE WITH THE PATIENT TODAY AND INFORMED HER OF HER PRE OP PREP  NPO AFTER MIDNIGHT THE NIGHT BEFORE  NOTHING ON THE SKIN, NO PERFUMES, DEODORANT  LOTIONS, POWDER, MAKE UP OR NAIL POLISH  ARRIVE AT 10 AM, PATIENT ACCESS AT [facility]  PATIENT WILL NEED A   POST OP APPOINTMENT IS 8-13-18  AM  [facility]

## 2018-08-07 NOTE — PROGRESS NOTES
[facility] [place] [address], [address]  W: [phone]  F: [phone]    Reason for Visit:   Teja Duff is a 43 y.o. female who is seen in consultation at the request of Dr. Jennifer Ness for evaluation of Left breast invasive carcinoma with lobular features, ER+OK+HER2 negative stage III    Treatment History:   · LEFT breast diagnostic mammogram and LEFT breast ultrasound done 6/14/18: BI-RADS 5. 2.1 cm hypoechoic mass 6:00 left breast.   · LEFT breast biopsy done 6/14/18 at [facility]. Pathology revealed LEFT breast invasive carcinoma with lobular features, grade 2, ER positive, OK positive, HER 2 - 0 by IHC  · 6/25/18 MRI  Left Breast:  1. BI-RADS Assessment Category 6: Known biopsy proven malignancy- Appropriate  action should be taken. 2.5 cm mass with an associated biopsy clip, located  within the posterior third of the left breast lower outer quadrant at  approximately 5:00. There are fingerlike extensions arising from the  inferior/medial aspect of this mass, as well as extending anteriorly toward the  nipple, concerning for extension of this mass into the middle and anterior  thirds of the left breast. Greatest radial distribution, when incorporating the  biopsy-proven malignancy and the anterior extensions, is 3.7 cm.  2. BI-RADS Assessment Category 4a: Suspicious abnormality - Biopsy should be  performed in the absence of clinical contraindication. 1 cm mass within the far  posterior and medial aspect of the left breast at approximately 8:00 to 9:00; a  fibroadenoma could have this appearance. Second look ultrasound with biopsy  could be performed if helpful to clinical management. 3. No lymphadenopathy.   · 7/31/18: L breast nipple delay and SLN- 2 + SLN, extended to ALND: 2/20+ LNs with extranodal extension ( 4/20 + LNs in all)      History of Present Illness:   Patient is a pleasant 43 y.o. female PMH as below who is seen for management of breast cancer. She initially noted some unexplained \"sensations\" in the left breast in 2018 which led to diagnosis and workup as above. She has recently had a sentinel lymph node biopsy that was extended to axillary node dissection and is healing well. She has minimal pain, no bleeding, denies any other lumps or bumps. She otherwise denies any chest pain, shortness of breath, headaches, falls, diarrhea, dysuria, changes in weight or appetite, headaches or rashes. Has met with Dr. Megan Flowers with medical oncology at [facility]. Also meeting with Dr. Gilford Eglin with [facility]. Genetic testing done by Dr. Capo Fam revealed variant of unknown significance. She is premenopausal    Family history is negative for any relatives with breast or ovarian cancer or pancreatic cancer. Mother had non-Hodgkin's lymphoma. Quit smoking 20 years ago, rare EtOH. Past Medical History:   Diagnosis Date    Acquired hypothyroidism     on levothyroxine     Anemia NEC     slow fe     Cancer (HCC)     LEFT BREAST    GERD (gastroesophageal reflux disease)     Hashimoto thyroiditis, fibrous variant     HX OTHER MEDICAL      live baby boy        Past Surgical History:   Procedure Laterality Date    HX HEENT      WISDOM TEETH EXTRACTION    HX OTHER SURGICAL      facial surgery      Social History   Substance Use Topics    Smoking status: Former Smoker     Quit date:     Smokeless tobacco: Never Used    Alcohol use No      Family History   Problem Relation Age of Onset    Arthritis-osteo Mother     Cancer Mother      non-hodgkin's lymphoma    Heart Disease Father     No Known Problems Brother     Anesth Problems Neg Hx      Current Outpatient Prescriptions   Medication Sig    acetaminophen (TYLENOL) 325 mg tablet Take  by mouth every four (4) hours as needed for Pain.     SYNTHROID 137 mcg tablet     NITRO-BID 2 % ointment     oxyCODONE-acetaminophen (PERCOCET) 5-325 mg per tablet Take 1 Tab by mouth every four (4) hours as needed for Pain. Max Daily Amount: 6 Tabs.  ondansetron (ZOFRAN ODT) 4 mg disintegrating tablet Take 1 Tab by mouth every eight (8) hours as needed for Nausea.  levothyroxine (SYNTHROID) 125 mcg tablet Take 137 mcg by mouth Daily (before breakfast). PT TAKES AT NIGHT  Indications: hypothyroidism     No current facility-administered medications for this visit. No Known Allergies     Review of Systems: A complete review of systems was obtained, negative except as described above. Physical Exam:     Visit Vitals    /70    Pulse 70    Temp 98.9 °F (37.2 °C)    Resp 20    Ht 5' 8\" (1.727 m)    Wt 150 lb (68 kg)    LMP 07/31/2018    SpO2 98%    BMI 22.81 kg/m2     ECOG PS: 1  General: No distress  Eyes: PERRLA, anicteric sclerae  HENT: Atraumatic, OP clear  Neck: Supple  Lymphatic: No cervical, supraclavicular, or inguinal adenopathy  Respiratory: CTAB, normal respiratory effort  Breast: Not examined as the visit was long and overwhelming for the patient  CV: Normal rate, regular rhythm, no murmurs, no peripheral edema  GI: Soft, nontender, nondistended, no masses, no hepatomegaly, no splenomegaly  MS: Normal gait and station. Digits without clubbing or cyanosis. Skin: No rashes, ecchymoses, or petechiae. Normal temperature, turgor, and texture.   Psych: Alert, oriented, appropriate affect, normal judgment/insight    Results:     Lab Results   Component Value Date/Time    WBC 6.4 07/23/2018 10:40 AM    HGB 12.4 07/23/2018 10:40 AM    HCT 38.3 07/23/2018 10:40 AM    PLATELET 160 56/30/6643 10:40 AM    MCV 92.7 07/23/2018 10:40 AM    Hemoglobin (POC) 12.9 08/05/2015 05:16 AM    Hematocrit (POC) 38 08/05/2015 05:16 AM     Lab Results   Component Value Date/Time    Sodium 142 07/23/2018 10:40 AM    Potassium 4.3 07/23/2018 10:40 AM    Chloride 109 (H) 07/23/2018 10:40 AM    CO2 28 07/23/2018 10:40 AM    Glucose 91 07/23/2018 10:40 AM    BUN 18 07/23/2018 10:40 AM Creatinine 0.92 07/23/2018 10:40 AM    GFR est AA >60 07/23/2018 10:40 AM    GFR est non-AA >60 07/23/2018 10:40 AM    Calcium 9.2 07/23/2018 10:40 AM    Sodium (POC) 139 08/05/2015 05:16 AM    Potassium (POC) 4.2 08/05/2015 05:16 AM    Chloride (POC) 106 08/05/2015 05:16 AM    Glucose (POC) 93 08/05/2015 05:16 AM    BUN (POC) 28 (H) 08/05/2015 05:16 AM    Creatinine (POC) 1.0 08/05/2015 05:16 AM    Calcium, ionized (POC) 1.29 08/05/2015 05:16 AM     No results found for: TBILI, ALT, SGOT, AP, TP, ALB, GLOB      Records reviewed and summarized above. Pathology report(s) reviewed above. Radiology report(s) reviewed above. Assessment:   1) T2N2MX- Atleast stage III Left breast Invasive carcinoma with lobular features- grade 2, ER+ KS+ HER2 negative     Genetic testing VUS    All available pathology and imaging. Discussed the natural history of stage III breast cancer. Due to lymph node involvement will complete staging studies in the form of CT and bone scan. However we are all hoping that this is still just localized disease. Discussed that in general for stage III disease with clinical characteristics as hers an estimated 10 year survival is about 60-70% in the absence of additional adjuvant therapy. Adjuvant endocrine therapy is shown to improve overall survival by about 30% (gain of about 8%), adjuvant radiation decreases the risk of local recurrences by 50% and saves one in 4 lives when followed over more than 15 years. Addition of third generation chemo therapy may additionally improve 10 year overall survival by another 30% (another absolute gain of about 8%)-clinically tools such as predict plus was used for these estimates and are fraught with limitations hence the statistics cannot be applied to all individuals- these estimates were given solely to help her estimate risks and benefits of interventions.      Traditionally, lymph node positivity has been thought to confer a worsened prognosis, with lymph node-positive cancers having almost a twofold increase in recurrence rates compared with lymph node-negative cancers, in the absence of chemotherapy. In General I uniformly recommend adjuvant chemotherapy for most otherwise fit and young patients who have more than equal to 4 positive lymph nodes. Then discussed that there are certain patients with positive lymph nodes who could omit chemotherapy they may have a relatively better prognosis. I do not recommend using genomic recurrence scores such as Oncotype dx in this situation (In the PlanB study , approximately 40 percent of patients had one to three involved lymph nodes. While subgroup analysis in patients with node-positive disease and low RS was not reported, the overall results of the study, in which patients with RS ? 11 experienced a 98 percent three-year progression-free survival, suggest that patients with limited mary disease may avoid chemotherapy if the RS is low)    He also discussed the choice of adjuvant chemotherapy. We discussed options such as dose dense AC followed by Taxol  or TC.  TC has been compared to TAC in a randomized phase 3 study however soon. Disease-free survival was noted for anthracycline-containing regimen and high risk disease such as those with positive lymph nodes. Discussed in depth the regimen, its expected side effects that includes hair loss, loss of fertility, menopause, nausea, vomiting, low blood counts, risk of life-threatening infections, mucositis, heart failure, neuropathy, small risk of leukemia etc.    Understands the need for port placement. Also understands that there are cares of permanent hair loss that have been reported.  Information on cooling cap was provided    We talked about reliable contraception    Discussed the sequence of treatments- Surgery-> chemotherapy-> XRT-> Endocrine therapy      Plan:     · CT CAP, Bone scan  · Surgery as scheduled  · RTC 3 weeks post op to to consent for adjuvant chemotherapy  · ECHO post op  · Port at the time of surgery by Dr. Norman Joshi    NN present    I spent > 50% of this 80 min face to face encounter in counseling and care co ordination    I appreciate the opportunity to participate in Ms. Fabio Villasenor's care.     Signed By: Harinder Gray MD

## 2018-08-07 NOTE — PATIENT INSTRUCTIONS
Breast Cancer: Care Instructions  Your Care Instructions    Breast cancer occurs when abnormal cells grow out of control in the breast. These cancer cells can spread within the breast, to nearby lymph nodes and other tissues, and to other parts of the body. Being treated for cancer can weaken your body, and you may feel very tired. Get the rest your body needs so you can feel better. Finding out that you have cancer is scary. You may feel many emotions and may need some help coping. Seek out family, friends, and counselors for support. You also can do things at home to make yourself feel better while you go through treatment. Call the [facility] [employer] ([phone]) or visit its website at [employer] for more information. Follow-up care is a key part of your treatment and safety. Be sure to make and go to all appointments, and call your doctor if you are having problems. It's also a good idea to know your test results and keep a list of the medicines you take. How can you care for yourself at home? · Take your medicines exactly as prescribed. Call your doctor if you think you are having a problem with your medicine. You may get medicine for nausea and vomiting if you have these side effects. · Follow your doctor's instructions to relieve pain. Pain from cancer and surgery can almost always be controlled. Use pain medicine when you first notice pain, before it becomes severe. · Eat healthy food. If you do not feel like eating, try to eat food that has protein and extra calories to keep up your strength and prevent weight loss. Drink liquid meal replacements for extra calories and protein. Try to eat your main meal early. · Get some physical activity every day, but do not get too tired. Keep doing the hobbies you enjoy as your energy allows. · Do not smoke. Smoking can make your cancer worse. If you need help quitting, talk to your doctor about stop-smoking programs and medicines.  These can increase your chances of quitting for good. · Take steps to control your stress and workload. Learn relaxation techniques. ¨ Share your feelings. Stress and tension affect our emotions. By expressing your feelings to others, you may be able to understand and cope with them. ¨ Consider joining a support group. Talking about a problem with your spouse, a good friend, or other people with similar problems is a good way to reduce tension and stress. ¨ Express yourself through art. Try writing, crafts, dance, or art to relieve stress. Some dance, writing, or art groups may be available just for people who have cancer. ¨ Be kind to your body and mind. Getting enough sleep, eating a healthy diet, and taking time to do things you enjoy can contribute to an overall feeling of balance in your life and can help reduce stress. ¨ Get help if you need it. Discuss your concerns with your doctor or counselor. · If you are vomiting or have diarrhea:  ¨ Drink plenty of fluids (enough so that your urine is light yellow or clear like water) to prevent dehydration. Choose water and other caffeine-free clear liquids. If you have kidney, heart, or liver disease and have to limit fluids, talk with your doctor before you increase the amount of fluids you drink. ¨ When you are able to eat, try clear soups, mild foods, and liquids until all symptoms are gone for 12 to 48 hours. Other good choices include dry toast, crackers, cooked cereal, and gelatin dessert, such as Jell-O.  · If you have not already done so, prepare a list of advance directives. Advance directives are instructions to your doctor and family members about what kind of care you want if you become unable to speak or express yourself. When should you call for help? Call 911 anytime you think you may need emergency care.  For example, call if:    · You passed out (lost consciousness).    Call your doctor now or seek immediate medical care if:    · You have a fever.     · You have abnormal bleeding.     · You think you have an infection.     · You have new or worse pain.     · You have new symptoms, such as a cough, belly pain, vomiting, diarrhea, or a rash.    Watch closely for changes in your health, and be sure to contact your doctor if:    · You are much more tired than usual.     · You have swollen glands in your armpits, groin, or neck.     · You do not get better as expected. Where can you learn more? Go to http://cesar-tracee.info/. Enter V321 in the search box to learn more about \"Breast Cancer: Care Instructions. \"  Current as of: May 12, 2017  Content Version: 11.7  © 6158-9261 [employer]. Care instructions adapted under license by [employer]. (which disclaims liability or warranty for this information). If you have questions about a medical condition or this instruction, always ask your healthcare professional. [address] any warranty or liability for your use of this information.

## 2018-08-10 ENCOUNTER — HOSPITAL ENCOUNTER (OUTPATIENT)
Dept: NUCLEAR MEDICINE | Age: 43
Discharge: HOME | End: 2018-08-10
Attending: INTERNAL MEDICINE
Payer: COMMERCIAL

## 2018-08-10 ENCOUNTER — HOSPITAL ENCOUNTER (OUTPATIENT)
Dept: CT IMAGING | Age: 43
Discharge: HOME | End: 2018-08-10
Attending: INTERNAL MEDICINE
Payer: COMMERCIAL

## 2018-08-10 ENCOUNTER — PATIENT OUTREACH (OUTPATIENT)
Dept: ONCOLOGY | Age: 43
End: 2018-08-10

## 2018-08-10 DIAGNOSIS — Z17.0 MALIGNANT NEOPLASM OF LOWER-OUTER QUADRANT OF LEFT BREAST OF FEMALE, ESTROGEN RECEPTOR POSITIVE (HCC): ICD-10-CM

## 2018-08-10 DIAGNOSIS — C50.512 MALIGNANT NEOPLASM OF LOWER-OUTER QUADRANT OF LEFT BREAST OF FEMALE, ESTROGEN RECEPTOR POSITIVE (HCC): ICD-10-CM

## 2018-08-10 PROCEDURE — 71260 CT THORAX DX C+: CPT

## 2018-08-10 PROCEDURE — 74177 CT ABD & PELVIS W/CONTRAST: CPT

## 2018-08-10 PROCEDURE — 74011250636 HC RX REV CODE- 250/636: Performed by: INTERNAL MEDICINE

## 2018-08-10 PROCEDURE — 74011636320 HC RX REV CODE- 636/320: Performed by: INTERNAL MEDICINE

## 2018-08-10 PROCEDURE — 78306 BONE IMAGING WHOLE BODY: CPT

## 2018-08-10 RX ORDER — SODIUM CHLORIDE 0.9 % (FLUSH) 0.9 %
10 SYRINGE (ML) INJECTION
Status: COMPLETED | OUTPATIENT
Start: 2018-08-10 | End: 2018-08-10

## 2018-08-10 RX ORDER — SODIUM CHLORIDE 9 MG/ML
50 INJECTION, SOLUTION INTRAVENOUS
Status: COMPLETED | OUTPATIENT
Start: 2018-08-10 | End: 2018-08-10

## 2018-08-10 RX ADMIN — SODIUM CHLORIDE 50 ML/HR: 900 INJECTION, SOLUTION INTRAVENOUS at 13:40

## 2018-08-10 RX ADMIN — IOPAMIDOL 100 ML: 755 INJECTION, SOLUTION INTRAVENOUS at 13:39

## 2018-08-10 RX ADMIN — IOHEXOL 30 ML: 300 INJECTION, SOLUTION INTRAVENOUS at 12:00

## 2018-08-10 RX ADMIN — Medication 10 ML: at 13:39

## 2018-08-10 NOTE — PROGRESS NOTES
[address]  Medical Oncology at [facility]   Nurse Navigator Note    Voicemail message from patient's spouse. Returned call and spoke with patient and spouse. They are at [facility] having imaging done today. They were told to expect to hear the results on Tuesday. This was concerning as Tuesday is the day of her surgery and they understand the imaging is to make sure surgery s the best course of treatment. NN reinforced results should be available for Dr. Michaela Carrizales by Monday. NN will discuss with Dr. Leti Beckham team.  They verbalized appreciation and understanding.

## 2018-08-13 DIAGNOSIS — C50.912 MALIGNANT NEOPLASM OF LEFT FEMALE BREAST, UNSPECIFIED ESTROGEN RECEPTOR STATUS, UNSPECIFIED SITE OF BREAST (HCC): Primary | ICD-10-CM

## 2018-08-14 ENCOUNTER — HOSPITAL ENCOUNTER (OUTPATIENT)
Age: 43
Setting detail: OBSERVATION
Discharge: HOME | End: 2018-08-15
Attending: SURGERY | Admitting: SURGERY
Payer: COMMERCIAL

## 2018-08-14 ENCOUNTER — ANESTHESIA (OUTPATIENT)
Dept: MEDSURG UNIT | Age: 43
End: 2018-08-14
Payer: COMMERCIAL

## 2018-08-14 ENCOUNTER — DOCUMENTATION ONLY (OUTPATIENT)
Dept: SURGERY | Age: 43
End: 2018-08-14

## 2018-08-14 ENCOUNTER — APPOINTMENT (OUTPATIENT)
Dept: GENERAL RADIOLOGY | Age: 43
End: 2018-08-14
Attending: SURGERY
Payer: COMMERCIAL

## 2018-08-14 ENCOUNTER — ANESTHESIA EVENT (OUTPATIENT)
Dept: MEDSURG UNIT | Age: 43
End: 2018-08-14
Payer: COMMERCIAL

## 2018-08-14 DIAGNOSIS — C77.3 BREAST CANCER METASTASIZED TO AXILLARY LYMPH NODE, LEFT (HCC): Primary | ICD-10-CM

## 2018-08-14 DIAGNOSIS — C50.912 MALIGNANT NEOPLASM OF LEFT FEMALE BREAST, UNSPECIFIED ESTROGEN RECEPTOR STATUS, UNSPECIFIED SITE OF BREAST (HCC): ICD-10-CM

## 2018-08-14 DIAGNOSIS — C50.912 BREAST CANCER METASTASIZED TO AXILLARY LYMPH NODE, LEFT (HCC): Primary | ICD-10-CM

## 2018-08-14 LAB — HCG UR QL: NEGATIVE

## 2018-08-14 PROCEDURE — 77030018836 HC SOL IRR NACL ICUM -A: Performed by: SURGERY

## 2018-08-14 PROCEDURE — 74011000250 HC RX REV CODE- 250: Performed by: SURGERY

## 2018-08-14 PROCEDURE — 77030002982 HC SUT POLYSRB J&J -A: Performed by: SURGERY

## 2018-08-14 PROCEDURE — 77030020256 HC SOL INJ NACL 0.9%  500ML: Performed by: SURGERY

## 2018-08-14 PROCEDURE — 81025 URINE PREGNANCY TEST: CPT

## 2018-08-14 PROCEDURE — 71045 X-RAY EXAM CHEST 1 VIEW: CPT

## 2018-08-14 PROCEDURE — 76030000007 HC AMB SURG OR TIME 3.5 TO 4: Performed by: SURGERY

## 2018-08-14 PROCEDURE — 74011250637 HC RX REV CODE- 250/637: Performed by: SURGERY

## 2018-08-14 PROCEDURE — 77030018842 HC SOL IRR SOD CL 9% BAXT -A: Performed by: SURGERY

## 2018-08-14 PROCEDURE — 77030039266 HC ADH SKN EXOFIN S2SG -A: Performed by: SURGERY

## 2018-08-14 PROCEDURE — 77030011267 HC ELECTRD BLD COVD -A: Performed by: SURGERY

## 2018-08-14 PROCEDURE — 74011250636 HC RX REV CODE- 250/636

## 2018-08-14 PROCEDURE — 77030002996 HC SUT SLK J&J -A: Performed by: SURGERY

## 2018-08-14 PROCEDURE — 77030012407 HC DRN WND BARD -B: Performed by: SURGERY

## 2018-08-14 PROCEDURE — 77030020782 HC GWN BAIR PAWS FLX 3M -B

## 2018-08-14 PROCEDURE — 76210000034 HC AMBSU PH I REC 0.5 TO 1 HR: Performed by: SURGERY

## 2018-08-14 PROCEDURE — 88307 TISSUE EXAM BY PATHOLOGIST: CPT | Performed by: SURGERY

## 2018-08-14 PROCEDURE — C1789 PROSTHESIS, BREAST, IMP: HCPCS | Performed by: SURGERY

## 2018-08-14 PROCEDURE — 77030002991 HC SUT QUILL SSPC -B: Performed by: SURGERY

## 2018-08-14 PROCEDURE — 74011000272 HC RX REV CODE- 272: Performed by: SURGERY

## 2018-08-14 PROCEDURE — 77030034850: Performed by: SURGERY

## 2018-08-14 PROCEDURE — 77030032490 HC SLV COMPR SCD KNE COVD -B: Performed by: SURGERY

## 2018-08-14 PROCEDURE — 77030031139 HC SUT VCRL2 J&J -A: Performed by: SURGERY

## 2018-08-14 PROCEDURE — 77030002933 HC SUT MCRYL J&J -A: Performed by: SURGERY

## 2018-08-14 PROCEDURE — 77030020061 HC IV BLD WRMR ADMIN SET 3M -B: Performed by: ANESTHESIOLOGY

## 2018-08-14 PROCEDURE — 76060000067 HC AMB SURG ANES 3.5 TO 4 HR: Performed by: SURGERY

## 2018-08-14 PROCEDURE — 76000 FLUOROSCOPY <1 HR PHYS/QHP: CPT

## 2018-08-14 PROCEDURE — 74011250636 HC RX REV CODE- 250/636: Performed by: ANESTHESIOLOGY

## 2018-08-14 PROCEDURE — 77030026438 HC STYL ET INTUB CARD -A: Performed by: ANESTHESIOLOGY

## 2018-08-14 PROCEDURE — 77030020269 HC MISC IMPL: Performed by: SURGERY

## 2018-08-14 PROCEDURE — 77030021516: Performed by: SURGERY

## 2018-08-14 PROCEDURE — 77030013567 HC DRN WND RESERV BARD -A: Performed by: SURGERY

## 2018-08-14 PROCEDURE — 77030008467 HC STPLR SKN COVD -B: Performed by: SURGERY

## 2018-08-14 PROCEDURE — 77030008684 HC TU ET CUF COVD -B: Performed by: ANESTHESIOLOGY

## 2018-08-14 PROCEDURE — C1788 PORT, INDWELLING, IMP: HCPCS | Performed by: SURGERY

## 2018-08-14 PROCEDURE — 77030019702 HC WRP THER MENM -C: Performed by: SURGERY

## 2018-08-14 PROCEDURE — 77030011266 HC ELECTRD BLD INSL COVD -A: Performed by: SURGERY

## 2018-08-14 PROCEDURE — 74011000250 HC RX REV CODE- 250

## 2018-08-14 PROCEDURE — 77030019908 HC STETH ESOPH SIMS -A: Performed by: ANESTHESIOLOGY

## 2018-08-14 PROCEDURE — 74011250636 HC RX REV CODE- 250/636: Performed by: SURGERY

## 2018-08-14 PROCEDURE — 77030013674 HC FIL EXPND TISS ALGN -A: Performed by: SURGERY

## 2018-08-14 PROCEDURE — 77030020263 HC SOL INJ SOD CL0.9% LFCR 1000ML: Performed by: SURGERY

## 2018-08-14 PROCEDURE — 77030002986 HC SUT PROL J&J -A: Performed by: SURGERY

## 2018-08-14 PROCEDURE — C9290 INJ, BUPIVACAINE LIPOSOME: HCPCS | Performed by: SURGERY

## 2018-08-14 PROCEDURE — 99218 HC RM OBSERVATION: CPT

## 2018-08-14 PROCEDURE — 77030034626 HC LIGASURE SM JAW SEAL OPN SURG COVD -E: Performed by: SURGERY

## 2018-08-14 PROCEDURE — 77030026227 HC FUNL KELLR 2 PCH ALGN -C: Performed by: SURGERY

## 2018-08-14 PROCEDURE — 77030011640 HC PAD GRND REM COVD -A: Performed by: SURGERY

## 2018-08-14 DEVICE — SYSTEM INFUS PRT CATH 8FR L66CM INTRO 8FR CHST TI SGL LUMN: Type: IMPLANTABLE DEVICE | Site: CHEST  WALL | Status: FUNCTIONAL

## 2018-08-14 RX ORDER — OXYCODONE AND ACETAMINOPHEN 5; 325 MG/1; MG/1
1 TABLET ORAL
Qty: 40 TAB | Refills: 0 | Status: SHIPPED | OUTPATIENT
Start: 2018-08-14 | End: 2018-10-12 | Stop reason: ALTCHOICE

## 2018-08-14 RX ORDER — OXYCODONE HYDROCHLORIDE 5 MG/1
5 TABLET ORAL AS NEEDED
Status: DISCONTINUED | OUTPATIENT
Start: 2018-08-14 | End: 2018-08-14 | Stop reason: HOSPADM

## 2018-08-14 RX ORDER — ONDANSETRON 2 MG/ML
4 INJECTION INTRAMUSCULAR; INTRAVENOUS
Status: DISCONTINUED | OUTPATIENT
Start: 2018-08-14 | End: 2018-08-15 | Stop reason: HOSPADM

## 2018-08-14 RX ORDER — NALOXONE HYDROCHLORIDE 0.4 MG/ML
0.4 INJECTION, SOLUTION INTRAMUSCULAR; INTRAVENOUS; SUBCUTANEOUS AS NEEDED
Status: DISCONTINUED | OUTPATIENT
Start: 2018-08-14 | End: 2018-08-15 | Stop reason: HOSPADM

## 2018-08-14 RX ORDER — SODIUM CHLORIDE, SODIUM LACTATE, POTASSIUM CHLORIDE, CALCIUM CHLORIDE 600; 310; 30; 20 MG/100ML; MG/100ML; MG/100ML; MG/100ML
50 INJECTION, SOLUTION INTRAVENOUS CONTINUOUS
Status: DISCONTINUED | OUTPATIENT
Start: 2018-08-14 | End: 2018-08-15 | Stop reason: HOSPADM

## 2018-08-14 RX ORDER — SODIUM CHLORIDE 0.9 % (FLUSH) 0.9 %
5-10 SYRINGE (ML) INJECTION AS NEEDED
Status: DISCONTINUED | OUTPATIENT
Start: 2018-08-14 | End: 2018-08-15 | Stop reason: HOSPADM

## 2018-08-14 RX ORDER — DIAZEPAM 5 MG/1
5 TABLET ORAL
Qty: 60 TAB | Refills: 0 | Status: SHIPPED | OUTPATIENT
Start: 2018-08-14 | End: 2018-10-12 | Stop reason: ALTCHOICE

## 2018-08-14 RX ORDER — MIDAZOLAM HYDROCHLORIDE 1 MG/ML
INJECTION, SOLUTION INTRAMUSCULAR; INTRAVENOUS AS NEEDED
Status: DISCONTINUED | OUTPATIENT
Start: 2018-08-14 | End: 2018-08-14 | Stop reason: HOSPADM

## 2018-08-14 RX ORDER — LIDOCAINE HYDROCHLORIDE 10 MG/ML
0.1 INJECTION, SOLUTION EPIDURAL; INFILTRATION; INTRACAUDAL; PERINEURAL AS NEEDED
Status: DISCONTINUED | OUTPATIENT
Start: 2018-08-14 | End: 2018-08-14 | Stop reason: HOSPADM

## 2018-08-14 RX ORDER — DIPHENHYDRAMINE HYDROCHLORIDE 50 MG/ML
12.5 INJECTION, SOLUTION INTRAMUSCULAR; INTRAVENOUS
Status: DISCONTINUED | OUTPATIENT
Start: 2018-08-14 | End: 2018-08-15 | Stop reason: HOSPADM

## 2018-08-14 RX ORDER — PROPOFOL 10 MG/ML
INJECTION, EMULSION INTRAVENOUS AS NEEDED
Status: DISCONTINUED | OUTPATIENT
Start: 2018-08-14 | End: 2018-08-14 | Stop reason: HOSPADM

## 2018-08-14 RX ORDER — FENTANYL CITRATE 50 UG/ML
50 INJECTION, SOLUTION INTRAMUSCULAR; INTRAVENOUS AS NEEDED
Status: DISCONTINUED | OUTPATIENT
Start: 2018-08-14 | End: 2018-08-14 | Stop reason: HOSPADM

## 2018-08-14 RX ORDER — LIDOCAINE HYDROCHLORIDE 20 MG/ML
INJECTION, SOLUTION EPIDURAL; INFILTRATION; INTRACAUDAL; PERINEURAL AS NEEDED
Status: DISCONTINUED | OUTPATIENT
Start: 2018-08-14 | End: 2018-08-14 | Stop reason: HOSPADM

## 2018-08-14 RX ORDER — ONDANSETRON 2 MG/ML
INJECTION INTRAMUSCULAR; INTRAVENOUS AS NEEDED
Status: DISCONTINUED | OUTPATIENT
Start: 2018-08-14 | End: 2018-08-14 | Stop reason: HOSPADM

## 2018-08-14 RX ORDER — SODIUM CHLORIDE, SODIUM LACTATE, POTASSIUM CHLORIDE, CALCIUM CHLORIDE 600; 310; 30; 20 MG/100ML; MG/100ML; MG/100ML; MG/100ML
INJECTION, SOLUTION INTRAVENOUS
Status: DISCONTINUED | OUTPATIENT
Start: 2018-08-14 | End: 2018-08-14 | Stop reason: HOSPADM

## 2018-08-14 RX ORDER — CEFAZOLIN SODIUM 2 G/50ML
2 SOLUTION INTRAVENOUS EVERY 8 HOURS
Status: COMPLETED | OUTPATIENT
Start: 2018-08-14 | End: 2018-08-15

## 2018-08-14 RX ORDER — SODIUM CHLORIDE 9 MG/ML
25 INJECTION, SOLUTION INTRAVENOUS CONTINUOUS
Status: DISCONTINUED | OUTPATIENT
Start: 2018-08-14 | End: 2018-08-14 | Stop reason: HOSPADM

## 2018-08-14 RX ORDER — CEFAZOLIN SODIUM IN 0.9 % NACL 2 G/100 ML
PLASTIC BAG, INJECTION (ML) INTRAVENOUS AS NEEDED
Status: DISCONTINUED | OUTPATIENT
Start: 2018-08-14 | End: 2018-08-14 | Stop reason: HOSPADM

## 2018-08-14 RX ORDER — SODIUM CHLORIDE 0.9 % (FLUSH) 0.9 %
5-10 SYRINGE (ML) INJECTION AS NEEDED
Status: DISCONTINUED | OUTPATIENT
Start: 2018-08-14 | End: 2018-08-14 | Stop reason: HOSPADM

## 2018-08-14 RX ORDER — OXYCODONE AND ACETAMINOPHEN 5; 325 MG/1; MG/1
1 TABLET ORAL
Status: DISCONTINUED | OUTPATIENT
Start: 2018-08-14 | End: 2018-08-15 | Stop reason: HOSPADM

## 2018-08-14 RX ORDER — DEXMEDETOMIDINE HYDROCHLORIDE 4 UG/ML
INJECTION, SOLUTION INTRAVENOUS
Status: DISCONTINUED | OUTPATIENT
Start: 2018-08-14 | End: 2018-08-14 | Stop reason: HOSPADM

## 2018-08-14 RX ORDER — HEPARIN 100 UNIT/ML
SYRINGE INTRAVENOUS AS NEEDED
Status: DISCONTINUED | OUTPATIENT
Start: 2018-08-14 | End: 2018-08-14 | Stop reason: HOSPADM

## 2018-08-14 RX ORDER — SODIUM CHLORIDE 0.9 % (FLUSH) 0.9 %
5-10 SYRINGE (ML) INJECTION EVERY 8 HOURS
Status: DISCONTINUED | OUTPATIENT
Start: 2018-08-14 | End: 2018-08-15 | Stop reason: HOSPADM

## 2018-08-14 RX ORDER — HYDROMORPHONE HYDROCHLORIDE 1 MG/ML
0.5 INJECTION, SOLUTION INTRAMUSCULAR; INTRAVENOUS; SUBCUTANEOUS
Status: DISCONTINUED | OUTPATIENT
Start: 2018-08-14 | End: 2018-08-15 | Stop reason: HOSPADM

## 2018-08-14 RX ORDER — ROCURONIUM BROMIDE 10 MG/ML
INJECTION, SOLUTION INTRAVENOUS AS NEEDED
Status: DISCONTINUED | OUTPATIENT
Start: 2018-08-14 | End: 2018-08-14 | Stop reason: HOSPADM

## 2018-08-14 RX ORDER — ROPIVACAINE HYDROCHLORIDE 5 MG/ML
150 INJECTION, SOLUTION EPIDURAL; INFILTRATION; PERINEURAL AS NEEDED
Status: DISCONTINUED | OUTPATIENT
Start: 2018-08-14 | End: 2018-08-14 | Stop reason: HOSPADM

## 2018-08-14 RX ORDER — MIDAZOLAM HYDROCHLORIDE 1 MG/ML
1 INJECTION, SOLUTION INTRAMUSCULAR; INTRAVENOUS AS NEEDED
Status: DISCONTINUED | OUTPATIENT
Start: 2018-08-14 | End: 2018-08-14 | Stop reason: HOSPADM

## 2018-08-14 RX ORDER — DIPHENHYDRAMINE HYDROCHLORIDE 50 MG/ML
12.5 INJECTION, SOLUTION INTRAMUSCULAR; INTRAVENOUS AS NEEDED
Status: DISCONTINUED | OUTPATIENT
Start: 2018-08-14 | End: 2018-08-14 | Stop reason: HOSPADM

## 2018-08-14 RX ORDER — SODIUM CHLORIDE, SODIUM LACTATE, POTASSIUM CHLORIDE, CALCIUM CHLORIDE 600; 310; 30; 20 MG/100ML; MG/100ML; MG/100ML; MG/100ML
1000 INJECTION, SOLUTION INTRAVENOUS CONTINUOUS
Status: DISCONTINUED | OUTPATIENT
Start: 2018-08-14 | End: 2018-08-14 | Stop reason: HOSPADM

## 2018-08-14 RX ORDER — MORPHINE SULFATE 10 MG/ML
2 INJECTION, SOLUTION INTRAMUSCULAR; INTRAVENOUS
Status: DISCONTINUED | OUTPATIENT
Start: 2018-08-14 | End: 2018-08-14 | Stop reason: HOSPADM

## 2018-08-14 RX ORDER — FENTANYL CITRATE 50 UG/ML
INJECTION, SOLUTION INTRAMUSCULAR; INTRAVENOUS AS NEEDED
Status: DISCONTINUED | OUTPATIENT
Start: 2018-08-14 | End: 2018-08-14 | Stop reason: HOSPADM

## 2018-08-14 RX ORDER — DEXAMETHASONE SODIUM PHOSPHATE 4 MG/ML
INJECTION, SOLUTION INTRA-ARTICULAR; INTRALESIONAL; INTRAMUSCULAR; INTRAVENOUS; SOFT TISSUE AS NEEDED
Status: DISCONTINUED | OUTPATIENT
Start: 2018-08-14 | End: 2018-08-14 | Stop reason: HOSPADM

## 2018-08-14 RX ORDER — FENTANYL CITRATE 50 UG/ML
25 INJECTION, SOLUTION INTRAMUSCULAR; INTRAVENOUS
Status: DISCONTINUED | OUTPATIENT
Start: 2018-08-14 | End: 2018-08-14 | Stop reason: HOSPADM

## 2018-08-14 RX ORDER — SODIUM CHLORIDE, SODIUM LACTATE, POTASSIUM CHLORIDE, CALCIUM CHLORIDE 600; 310; 30; 20 MG/100ML; MG/100ML; MG/100ML; MG/100ML
100 INJECTION, SOLUTION INTRAVENOUS CONTINUOUS
Status: DISCONTINUED | OUTPATIENT
Start: 2018-08-14 | End: 2018-08-14 | Stop reason: HOSPADM

## 2018-08-14 RX ORDER — ONDANSETRON 4 MG/1
4 TABLET, ORALLY DISINTEGRATING ORAL
Qty: 5 TAB | Refills: 1 | Status: SHIPPED | OUTPATIENT
Start: 2018-08-14 | End: 2018-10-12 | Stop reason: ALTCHOICE

## 2018-08-14 RX ORDER — BUPIVACAINE HYDROCHLORIDE 5 MG/ML
INJECTION, SOLUTION EPIDURAL; INTRACAUDAL AS NEEDED
Status: DISCONTINUED | OUTPATIENT
Start: 2018-08-14 | End: 2018-08-14 | Stop reason: HOSPADM

## 2018-08-14 RX ORDER — SODIUM CHLORIDE 0.9 % (FLUSH) 0.9 %
5-10 SYRINGE (ML) INJECTION EVERY 8 HOURS
Status: DISCONTINUED | OUTPATIENT
Start: 2018-08-14 | End: 2018-08-14 | Stop reason: HOSPADM

## 2018-08-14 RX ORDER — ONDANSETRON 2 MG/ML
4 INJECTION INTRAMUSCULAR; INTRAVENOUS AS NEEDED
Status: DISCONTINUED | OUTPATIENT
Start: 2018-08-14 | End: 2018-08-14 | Stop reason: HOSPADM

## 2018-08-14 RX ORDER — DIAZEPAM 5 MG/1
5 TABLET ORAL
Status: DISCONTINUED | OUTPATIENT
Start: 2018-08-14 | End: 2018-08-15 | Stop reason: HOSPADM

## 2018-08-14 RX ORDER — MIDAZOLAM HYDROCHLORIDE 1 MG/ML
0.5 INJECTION, SOLUTION INTRAMUSCULAR; INTRAVENOUS
Status: DISCONTINUED | OUTPATIENT
Start: 2018-08-14 | End: 2018-08-14 | Stop reason: HOSPADM

## 2018-08-14 RX ORDER — DEXMEDETOMIDINE HYDROCHLORIDE 4 UG/ML
INJECTION, SOLUTION INTRAVENOUS AS NEEDED
Status: DISCONTINUED | OUTPATIENT
Start: 2018-08-14 | End: 2018-08-14 | Stop reason: HOSPADM

## 2018-08-14 RX ADMIN — SODIUM CHLORIDE, SODIUM LACTATE, POTASSIUM CHLORIDE, CALCIUM CHLORIDE: 600; 310; 30; 20 INJECTION, SOLUTION INTRAVENOUS at 11:53

## 2018-08-14 RX ADMIN — ONDANSETRON 4 MG: 2 INJECTION, SOLUTION INTRAMUSCULAR; INTRAVENOUS at 17:24

## 2018-08-14 RX ADMIN — DEXMEDETOMIDINE HYDROCHLORIDE 10 MCG: 4 INJECTION, SOLUTION INTRAVENOUS at 12:23

## 2018-08-14 RX ADMIN — ONDANSETRON 4 MG: 2 INJECTION INTRAMUSCULAR; INTRAVENOUS at 13:30

## 2018-08-14 RX ADMIN — SODIUM CHLORIDE, SODIUM LACTATE, POTASSIUM CHLORIDE, AND CALCIUM CHLORIDE 50 ML/HR: 600; 310; 30; 20 INJECTION, SOLUTION INTRAVENOUS at 17:12

## 2018-08-14 RX ADMIN — SODIUM CHLORIDE, SODIUM LACTATE, POTASSIUM CHLORIDE, CALCIUM CHLORIDE: 600; 310; 30; 20 INJECTION, SOLUTION INTRAVENOUS at 15:29

## 2018-08-14 RX ADMIN — SODIUM CHLORIDE, SODIUM LACTATE, POTASSIUM CHLORIDE, AND CALCIUM CHLORIDE 1000 ML: 600; 310; 30; 20 INJECTION, SOLUTION INTRAVENOUS at 11:39

## 2018-08-14 RX ADMIN — FENTANYL CITRATE 50 MCG: 50 INJECTION, SOLUTION INTRAMUSCULAR; INTRAVENOUS at 13:58

## 2018-08-14 RX ADMIN — MIDAZOLAM HYDROCHLORIDE 2 MG: 1 INJECTION, SOLUTION INTRAMUSCULAR; INTRAVENOUS at 11:53

## 2018-08-14 RX ADMIN — LIDOCAINE HYDROCHLORIDE 100 MG: 20 INJECTION, SOLUTION EPIDURAL; INFILTRATION; INTRACAUDAL; PERINEURAL at 12:02

## 2018-08-14 RX ADMIN — DEXMEDETOMIDINE HYDROCHLORIDE 0.3 MCG/KG/HR: 4 INJECTION, SOLUTION INTRAVENOUS at 13:49

## 2018-08-14 RX ADMIN — FENTANYL CITRATE 50 MCG: 50 INJECTION, SOLUTION INTRAMUSCULAR; INTRAVENOUS at 12:56

## 2018-08-14 RX ADMIN — FENTANYL CITRATE 50 MCG: 50 INJECTION, SOLUTION INTRAMUSCULAR; INTRAVENOUS at 12:23

## 2018-08-14 RX ADMIN — CEFAZOLIN SODIUM 2 G: 2 SOLUTION INTRAVENOUS at 20:42

## 2018-08-14 RX ADMIN — DEXMEDETOMIDINE HYDROCHLORIDE 5 MCG: 4 INJECTION, SOLUTION INTRAVENOUS at 12:57

## 2018-08-14 RX ADMIN — DEXAMETHASONE SODIUM PHOSPHATE 8 MG: 4 INJECTION, SOLUTION INTRA-ARTICULAR; INTRALESIONAL; INTRAMUSCULAR; INTRAVENOUS; SOFT TISSUE at 12:08

## 2018-08-14 RX ADMIN — Medication 2 G: at 12:05

## 2018-08-14 RX ADMIN — PROPOFOL 200 MG: 10 INJECTION, EMULSION INTRAVENOUS at 12:02

## 2018-08-14 RX ADMIN — ROCURONIUM BROMIDE 50 MG: 10 INJECTION, SOLUTION INTRAVENOUS at 12:02

## 2018-08-14 RX ADMIN — Medication 2 G: at 13:30

## 2018-08-14 NOTE — PROGRESS NOTES
Bedside and Verbal shift change report given to [address] (oncoming nurse) by Miguelangel Wheeler RN (offgoing nurse). Report included the following information SBAR, Kardex, Intake/Output, MAR and Accordion. 0210: pt ambulate 5 ft, then started feeling light headed and dizzy.  Helped back to bed.    0455: pt ambulated in [place] with no light headed or dizziness

## 2018-08-14 NOTE — ANESTHESIA POSTPROCEDURE EVALUATION
Post-Anesthesia Evaluation and Assessment    Patient: Anatoly Wang MRN: [id number]  SSN: [id number]    YOB: 1975  Age: 43 y.o. Sex: female       Cardiovascular Function/Vital Signs  Visit Vitals    /60    Pulse 65    Temp 35.4 °C (95.7 °F)    Resp 17    Ht 5' 8\" (1.727 m)    Wt 66.2 kg (146 lb)    SpO2 98%    BMI 22.2 kg/m2       Patient is status post general anesthesia for Procedure(s):  LEFT BREAST SKIN AND NIPPLE sparing MASTECTOMY, LEFT INSERTION BREAST IMPLANT WITH ALLODERM PLACEMENT, RIGHT BREAST AUGMENTATION, PORT A CATH INSERTION  INFUSAPORT INSERTION  BREAST RECONSTRUCTION. Nausea/Vomiting: None    Postoperative hydration reviewed and adequate. Pain:  Pain Scale 1: Numeric (0 - 10) (08/14/18 1600)  Pain Intensity 1: 0 (08/14/18 1600)   Managed    Neurological Status:   Neuro (WDL): Within Defined Limits (08/14/18 1600)  Neuro  Neurologic State: Sleeping (08/14/18 1545)  LUE Motor Response: Purposeful (08/14/18 1600)  LLE Motor Response: Purposeful (08/14/18 1600)  RUE Motor Response: Purposeful (08/14/18 1600)  RLE Motor Response: Purposeful (08/14/18 1600)   At baseline    Mental Status and Level of Consciousness: Arousable    Pulmonary Status:   O2 Device: Room air (08/14/18 1600)   Adequate oxygenation and airway patent    Complications related to anesthesia: None    Post-anesthesia assessment completed.  No concerns    Signed By: Joseph Albarran MD     August 14, 2018

## 2018-08-14 NOTE — PROGRESS NOTES
TRANSFER - IN REPORT:    Verbal report received from Krishan Alarcon RN(name) on Anastasia Watson  being received from [facility](unit) for routine post - op      Report consisted of patients Situation, Background, Assessment and   Recommendations(SBAR). Information from the following report(s) SBAR, Kardex, Procedure Summary, Intake/Output, MAR, Accordion, Med Rec Status and Cardiac Rhythm NSR was reviewed with the receiving nurse. Opportunity for questions and clarification was provided. Assessment completed upon patients arrival to unit and care assumed.

## 2018-08-14 NOTE — INTERVAL H&P NOTE
H&P Update:  Miguel Humphries was seen and examined. History and physical has been reviewed. The patient has been examined.  There have been no significant clinical changes since the completion of the originally dated History and Physical.    Signed By: Ja Brooks MD     August 14, 2018 11:03 AM

## 2018-08-14 NOTE — IP AVS SNAPSHOT
110 [facility] [address] [place] [address] 
[phone] Patient: Koby Hoffman MRN: [id number] :1975 A check rosanna indicates which time of day the medication should be taken. My Medications START taking these medications Instructions Each Dose to Equal  
 Morning Noon Evening Bedtime  
 diazePAM 5 mg tablet Commonly known as:  VALIUM Your last dose was: Your next dose is: Take 1 Tab by mouth every six (6) hours as needed for Anxiety (muscle spasm). Max Daily Amount: 20 mg.  
 5 mg  
    
   
   
   
  
 naloxone 2 mg/actuation Spry Commonly known as:  [employer] Your last dose was: Your next dose is:    
   
   
 Use 1 spray intranasally, then discard. Repeat with new spray every 2 min as needed for opioid overdose symptoms, alternating nostrils. CHANGE how you take these medications Instructions Each Dose to Equal  
 Morning Noon Evening Bedtime * ondansetron 4 mg disintegrating tablet Commonly known as:  ZOFRAN ODT What changed:  Another medication with the same name was added. Make sure you understand how and when to take each. Your last dose was: Your next dose is: Take 1 Tab by mouth every eight (8) hours as needed for Nausea. 4 mg * ondansetron 4 mg disintegrating tablet Commonly known as:  ZOFRAN ODT What changed: You were already taking a medication with the same name, and this prescription was added. Make sure you understand how and when to take each. Your last dose was: Your next dose is: Take 1 Tab by mouth every eight (8) hours as needed for Nausea. 4 mg * oxyCODONE-acetaminophen 5-325 mg per tablet Commonly known as:  PERCOCET What changed:  Another medication with the same name was added.  Make sure you understand how and when to take each. Your last dose was: Your next dose is: Take 1 Tab by mouth every four (4) hours as needed for Pain. Max Daily Amount: 6 Tabs. 1 Tab * oxyCODONE-acetaminophen 5-325 mg per tablet Commonly known as:  PERCOCET What changed: You were already taking a medication with the same name, and this prescription was added. Make sure you understand how and when to take each. Your last dose was: Your next dose is: Take 1 Tab by mouth every four (4) hours as needed for Pain. Max Daily Amount: 6 Tabs. 1 Tab * Notice: This list has 4 medication(s) that are the same as other medications prescribed for you. Read the directions carefully, and ask your doctor or other care provider to review them with you. CONTINUE taking these medications Instructions Each Dose to Equal  
 Morning Noon Evening Bedtime * levothyroxine 125 mcg tablet Commonly known as:  SYNTHROID Your last dose was: Your next dose is: Take 137 mcg by mouth Daily (before breakfast). PT TAKES AT NIGHT  Indications: hypothyroidism 137 mcg  
    
   
   
   
  
 * SYNTHROID 137 mcg tablet Generic drug:  levothyroxine Your last dose was: Your next dose is:    
   
   
      
   
   
   
  
 NITRO-BID 2 % ointment Generic drug:  nitroglycerin Your last dose was: Your next dose is: * Notice: This list has 2 medication(s) that are the same as other medications prescribed for you. Read the directions carefully, and ask your doctor or other care provider to review them with you. STOP taking these medications TYLENOL 325 mg tablet Generic drug:  acetaminophen Where to Get Your Medications These medications were sent to [facility] [address] [place] [address] - [address] [place] [address] Phone:  [phone]  
  naloxone 2 mg/actuation Spry Information on where to get these meds will be given to you by the nurse or doctor. ! Ask your nurse or doctor about these medications  
  diazePAM 5 mg tablet  
 ondansetron 4 mg disintegrating tablet  
 oxyCODONE-acetaminophen 5-325 mg per tablet

## 2018-08-14 NOTE — PERIOP NOTES
TRANSFER - OUT REPORT:    Verbal report given to SANDRA Rojas(name) on Anant Herrera  being transferred to [facility] (unit) for routine post - op       Report consisted of patients Situation, Background, Assessment and   Recommendations(SBAR). Information from the following report(s) SBAR, Kardex, OR Summary, Intake/Output, MAR and Cardiac Rhythm NSR was reviewed with the receiving nurse. Lines:   Peripheral IV 08/14/18 Right Antecubital (Active)   Site Assessment Clean, dry, & intact 8/14/2018  4:00 PM   Phlebitis Assessment 0 8/14/2018  4:00 PM   Infiltration Assessment 0 8/14/2018  4:00 PM   Dressing Status Clean, dry, & intact 8/14/2018  4:00 PM   Dressing Type Tape;Transparent 8/14/2018  4:00 PM   Hub Color/Line Status Pink; Infusing 8/14/2018  4:00 PM        Opportunity for questions and clarification was provided.       Patient transported with:   Registered Nurse

## 2018-08-14 NOTE — IP AVS SNAPSHOT
[address] [facility] [address] 
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 
[phone] Christy Camp MD Schedule an appointment as soon as possible for a visit in 1 week  [address] [place] [address] 
[phone] Discharge Orders None A check rosanna indicates which time of day the medication should be taken. My Medications START taking these medications Instructions Each Dose to Equal  
 Morning Noon Evening Bedtime  
 diazePAM 5 mg tablet Commonly known as:  VALIUM Your last dose was: Your next dose is: Take 1 Tab by mouth every six (6) hours as needed for Anxiety (muscle spasm). Max Daily Amount: 20 mg.  
 5 mg  
    
   
   
   
  
 naloxone 2 mg/actuation Spry Commonly known as:  [employer] Your last dose was: Your next dose is:    
   
   
 Use 1 spray intranasally, then discard. Repeat with new spray every 2 min as needed for opioid overdose symptoms, alternating nostrils. CHANGE how you take these medications Instructions Each Dose to Equal  
 Morning Noon Evening Bedtime * ondansetron 4 mg disintegrating tablet Commonly known as:  ZOFRAN ODT What changed:  Another medication with the same name was added. Make sure you understand how and when to take each. Your last dose was: Your next dose is: Take 1 Tab by mouth every eight (8) hours as needed for Nausea. 4 mg * ondansetron 4 mg disintegrating tablet Commonly known as:  ZOFRAN ODT What changed: You were already taking a medication with the same name, and this prescription was added. Make sure you understand how and when to take each. Your last dose was: Your next dose is: Take 1 Tab by mouth every eight (8) hours as needed for Nausea. 4 mg * oxyCODONE-acetaminophen 5-325 mg per tablet Commonly known as:  PERCOCET What changed:  Another medication with the same name was added. Make sure you understand how and when to take each. Your last dose was: Your next dose is: Take 1 Tab by mouth every four (4) hours as needed for Pain. Max Daily Amount: 6 Tabs. 1 Tab * oxyCODONE-acetaminophen 5-325 mg per tablet Commonly known as:  PERCOCET What changed: You were already taking a medication with the same name, and this prescription was added. Make sure you understand how and when to take each. Your last dose was: Your next dose is: Take 1 Tab by mouth every four (4) hours as needed for Pain. Max Daily Amount: 6 Tabs. 1 Tab * Notice: This list has 4 medication(s) that are the same as other medications prescribed for you. Read the directions carefully, and ask your doctor or other care provider to review them with you. CONTINUE taking these medications Instructions Each Dose to Equal  
 Morning Noon Evening Bedtime * levothyroxine 125 mcg tablet Commonly known as:  SYNTHROID Your last dose was: Your next dose is: Take 137 mcg by mouth Daily (before breakfast). PT TAKES AT NIGHT  Indications: hypothyroidism 137 mcg  
    
   
   
   
  
 * SYNTHROID 137 mcg tablet Generic drug:  levothyroxine Your last dose was: Your next dose is:    
   
   
      
   
   
   
  
 NITRO-BID 2 % ointment Generic drug:  nitroglycerin Your last dose was: Your next dose is: * Notice: This list has 2 medication(s) that are the same as other medications prescribed for you. Read the directions carefully, and ask your doctor or other care provider to review them with you. STOP taking these medications TYLENOL 325 mg tablet Generic drug:  acetaminophen Where to Get Your Medications These medications were sent to Kristin [facility] [address] [place] [address], [address] [place] [address] Phone:  [phone]  
  naloxone 2 mg/actuation Spry Information on where to get these meds will be given to you by the nurse or doctor. ! Ask your nurse or doctor about these medications  
  diazePAM 5 mg tablet  
 ondansetron 4 mg disintegrating tablet  
 oxyCODONE-acetaminophen 5-325 mg per tablet Opioid Education Prescription Opioids: What You Need to Know: 
 
Prescription opioids can be used to help relieve moderate-to-severe pain and are often prescribed following a surgery or injury, or for certain health conditions. These medications can be an important part of treatment but also come with serious risks. Opioids are strong pain medicines. Examples include hydrocodone, oxycodone, fentanyl, and morphine. Heroin is an example of an illegal opioid. It is important to work with your health care provider to make sure you are getting the safest, most effective care. WHAT ARE THE RISKS AND SIDE EFFECTS OF OPIOID USE? Prescription opioids carry serious risks of addiction and overdose, especially with prolonged use. An opioid overdose, often marked by slow breathing, can cause sudden death.   The use of prescription opioids can have a number of side effects as well, even when taken as directed. · Tolerance-meaning you might need to take more of a medication for the same pain relief · Physical dependence-meaning you have symptoms of withdrawal when the medication is stopped. Withdrawal symptoms can include nausea, sweating, chills, diarrhea, stomach cramps, and muscle aches. Withdrawal can last up to several weeks, depending on which drug you took and how long you took it. · Increased sensitivity to pain · Constipation · Nausea, vomiting, and dry mouth · Sleepiness and dizziness · Confusion · Depression · Low levels of testosterone that can result in lower sex drive, energy, and strength · Itching and sweating RISKS ARE GREATER WITH:      
· History of drug misuse, substance use disorder, or overdose · Mental health conditions (such as depression or anxiety) · Sleep apnea · Older age (72 years or older) · Pregnancy Avoid alcohol while taking prescription opioids. Also, unless specifically advised by your health care provider, medications to avoid include: · Benzodiazepines (such as Xanax or Valium) · Muscle relaxants (such as Soma or Flexeril) · Hypnotics (such as Ambien or Lunesta) · Other prescription opioids KNOW YOUR OPTIONS Talk to your health care provider about ways to manage your pain that don't involve prescription opioids. Some of these options may actually work better and have fewer risks and side effects. Options may include: 
· Pain relievers such as acetaminophen, ibuprofen, and naproxen · Some medications that are also used for depression or seizures · Physical therapy and exercise · Counseling to help patients learn how to cope better with triggers of pain and stress. · Application of heat or cold compress · Massage therapy · Relaxation techniques Be Informed Make sure you know the name of your medication, how much and how often to take it, and its potential risks & side effects. IF YOU ARE PRESCRIBED OPIOIDS FOR PAIN: 
· Never take opioids in greater amounts or more often than prescribed. Remember the goal is not to be pain-free but to manage your pain at a tolerable level. · Follow up with your primary care provider to: · Work together to create a plan on how to manage your pain. · Talk about ways to help manage your pain that don't involve prescription opioids. · Talk about any and all concerns and side effects. · Help prevent misuse and abuse. · Never sell or share prescription opioids · Help prevent misuse and abuse. · Store prescription opioids in a secure place and out of reach of others (this may include visitors, children, friends, and family). · Safely dispose of unused/unwanted prescription opioids: Find your community drug take-back program or your pharmacy mail-back program, or flush them down the toilet, following guidance from the Food and Drug Administration (www.fda.gov/Drugs/ResourcesForYou). · Visit www.cdc.gov/drugoverdose to learn about the risks of opioid abuse and overdose. · If you believe you may be struggling with addiction, tell your health care provider and ask for guidance or call [address] [place] [address] [employer] at [phone]-FTFP. Discharge Instructions Discharge Instructions from Dr. Celine Bennett · I will call you with the pathology results, typically within 1 week from today. · You may shower, but no hot tubs, swimming pools, or baths until your incision is healed. · No heavy lifting with the affected extremity (nothing greater than 5 pounds), and limit its use for the next 4-5 days. · NO ICE 
· If I placed a drain, follow the drain instructions provided, especially as you keep a record of the drain output. · Follow medication instructions carefully. · Watch for signs of infection as listed below. · Redness · Swelling · Drainage from the incision or from your nipple that appears infected · Fever over 101 degrees for consecutive readings, or over 99.5 if you are currently undergoing chemotherapy. · Call our office (number is below) for a follow-up appointment. · If you have any problems, our phone number is [phone]. Introducing [facility]! Dear Franci Orr: 
Thank you for requesting a [employer] account. Our records indicate that you already have an active [employer] account. You can access your account anytime at [email or web address] Did you know that you can access your hospital and ER discharge instructions at any time in [employer]? You can also review all of your test results from your hospital stay or ER visit. Additional Information If you have questions, please visit the Frequently Asked Questions section of the [employer] website at [email or web address] Remember, [employer] is NOT to be used for urgent needs. For medical emergencies, dial 911. Now available from your iPhone and Android! Introducing Anatoly Su As a [employer] patient, I wanted to make you aware of our electronic visit tool called Anatoly Su. [employer] 24/7 allows you to connect within minutes with a medical provider 24 hours a day, seven days a week via a mobile device or tablet or logging into a secure website from your computer. You can access Anatoly Su from anywhere in the [place]. A virtual visit might be right for you when you have a simple condition and feel like you just dont want to get out of bed, or cant get away from work for an appointment, when your regular [employer] provider is not available (evenings, weekends or holidays), or when youre out of town and need minor care.   Electronic visits cost only $49 and if the [facility] 24/7 provider determines a prescription is needed to treat your condition, one can be electronically transmitted to a nearby pharmacy*. Please take a moment to enroll today if you have not already done so. The enrollment process is free and takes just a few minutes. To enroll, please download the [employer] 24/7 bettie to your tablet or phone, or visit [email or web address] to enroll on your computer. And, as an [address] [place] [address] patient with a [employer] account, the results of your visits will be scanned into your electronic medical record and your primary care provider will be able to view the scanned results. We urge you to continue to see your regular [employer] provider for your ongoing medical care. And while your primary care provider may not be the one available when you seek a [employer] virtual visit, the peace of mind you get from getting a real diagnosis real time can be priceless. For more information on [employer], view our Frequently Asked Questions (FAQs) at [email or web address]. Sincerely, 
 
Valentino Milks, MD 
Chief Medical Officer [employer] *:  certain medications cannot be prescribed via [employer] Providers Seen During Your Hospitalization Provider Specialty Primary office phone Jm Galicia MD Breast Surgery [phone] Ana Richardson, [place] [facility] [phone] Your Primary Care Physician (PCP) Primary Care Physician Office Phone Office Fax Dany Matter [phone] You are allergic to the following No active allergies Recent Documentation Height Weight BMI OB Status Smoking Status 1.727 m 66.2 kg 22.2 kg/m2 Having regular periods Former Smoker Emergency Contacts Name Discharge Info Relation Home Work Mobile Devonte Villasenor DISCHARGE CAREGIVER [3] Spouse [3] [phone] Patient Belongings The following personal items are in your possession at time of discharge: 
  Dental Appliances: None  Visual Aid: None   Hearing Aids/Status: Does not own         Clothing:  (clothes to locker) Please provide this summary of care documentation to your next provider. Signatures-by signing, you are acknowledging that this After Visit Summary has been reviewed with you and you have received a copy. Patient Signature:  ____________________________________________________________ Date:  ____________________________________________________________  
  
[facility] Provider Signature:  ____________________________________________________________ Date:  ____________________________________________________________

## 2018-08-14 NOTE — BRIEF OP NOTE
BRIEF OPERATIVE NOTE    Date of Procedure: 8/14/2018   Preoperative Diagnosis: LEFT BREAST CANCER WITH AXILLARY METS  Postoperative Diagnosis: SAME  Procedure(s):  LEFT BREAST SKIN AND NIPPLE MASTECTOMY, LEFT INSERTION BREAST IMPLANT WITH ALLODERM PLACEMENT, RIGHT BREAST AUGMENTATION, PORT A CATH INSERTION  INFUSAPORT INSERTION  BREAST RECONSTRUCTION  Surgeon(s) and Role:  Panel 1:     * Palma Scott MD - Primary    Panel 2:     * Latrell Handy MD - Primary         Surgical Assistant: Maycol Connor    Surgical Staff:  Circ-1: Miller Gamez RN  Circ-Relief: Jm Hurtado RN  Radiology Technician: Carolina Duran RT, R  Registered Nurse Assistant: Veena Salcedo RN  Scrub Tech-1: Pilar Garnett  Scrub RN-1: Williams Joshi RN  Event Time In   Incision Start 1226   Incision Close      Anesthesia: General   Estimated Blood Loss: 100 ML  Specimens:   ID Type Source Tests Collected by Time Destination   1 : LEFT MASTECTOMY  Fresh Breast  Ja Brooks MD 8/14/2018 1338 Pathology      Findings: TIP PORT JUNCTION SVC AND ATRIUM ON RIGHT   Complications: NONE  Implants:   Implant Name Type Inv.  Item Serial No.  Lot No. LRB No. Used Action   PORT VASC INFUS SET 8FR TI -- SMART PORT CT - SNA  PORT VASC INFUS SET 8FR TI -- SMART PORT CT NA [employer]GI[employer]NAM[employer] [id number] Right 1 Implanted   NATRELLE INSPIRA COHESIVE BREAST IMPLANT    [id number] [employer] [facility] NA Left 1 Implanted   NATRELLE SALINE-FILLED BREAST IMPLANT   [id number] [employer] [facility] NA Right 1 Implanted                1 Implanted       DICTATED [id number]

## 2018-08-14 NOTE — H&P (VIEW-ONLY)
HISTORY OF PRESENT ILLNESS  Hillary Pena is a 43 y.o. female. HPI  ESTABLISHED patient here for to discuss breast cancer surgery. She is here with her . Nipple delay surgery is scheduled for 7/31/18 and her LEFT breast skin and nipple sparing mastectomy with reconstrction is scheduled for 8/14/18. She is doing well. Still has some tenderness to her nipple from her biopsy. 42 yo female with left breast T2 N0  invasive carcinoma with lobular features, grade 2, ER positive, CA positive, HER 2 negative. Also lcis and papilloma on path. No family history of breast or ovarian cancer. Her mother had non-hodgkin's lymphoma. 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 Review of Systems   All other systems reviewed and are negative. Physical Exam   Pulmonary/Chest:   Exam unchanged     Nursing note and vitals reviewed. ASSESSMENT and PLAN    ICD-10-CM ICD-9-CM    1. Malignant neoplasm of lower-outer quadrant of left breast of female, estrogen receptor positive ([facility] C50.512 174.5     Z17.0 V86.0      42 yo female with  left breast T2 N0  invasive carcinoma with lobular features, grade 2, ER positive, CA positive, HER 2 negative. Also lcis and papilloma on path. She is here to discuss surgery and postoperative care. We discussed the procedures of left nipple delay, sln biopsy, skin and nipple sparing mastectomy on left. Risks include bleeding, bruising, scar, infection, need for more surgery and lymphedema. She understood and wished to proceed. Discussed referral to med onc. She has appointments with Dr. Dalton Aldana and Dr. Knox Shoulder  Discussed genetic testing results of vus. Discussed postop wound care and activity restrictions. Total discussion time 30 minutes.

## 2018-08-14 NOTE — ANESTHESIA PREPROCEDURE EVALUATION
Anesthetic History   No history of anesthetic complications            Review of Systems / Medical History  Patient summary reviewed, nursing notes reviewed and pertinent labs reviewed    Pulmonary  Within defined limits                 Neuro/Psych   Within defined limits           Cardiovascular  Within defined limits                     GI/Hepatic/Renal     GERD           Endo/Other      Hypothyroidism  Cancer and anemia     Other Findings            Physical Exam    Airway  Mallampati: II  TM Distance: > 6 cm  Neck ROM: normal range of motion   Mouth opening: Normal     Cardiovascular  Regular rate and rhythm,  S1 and S2 normal,  no murmur, click, rub, or gallop             Dental  No notable dental hx       Pulmonary  Breath sounds clear to auscultation               Abdominal  GI exam deferred       Other Findings            Anesthetic Plan    ASA: 2  Anesthesia type: general          Induction: Intravenous  Anesthetic plan and risks discussed with: Patient

## 2018-08-14 NOTE — ROUTINE PROCESS
Patient: Marla Calderón MRN: [id number]  SSN: [id number]   YOB: 1975  Age: 43 y.o. Sex: female     Patient is status post Procedure(s):  LEFT BREAST SKIN AND NIPPLE MASTECTOMY, LEFT INSERTION BREAST IMPLANT WITH ALLODERM PLACEMENT, RIGHT BREAST AUGMENTATION, PORT A CATH INSERTION  INFUSAPORT INSERTION  BREAST RECONSTRUCTION. Surgeon(s) and Role:  Panel 1:     * Aristeo Chicas MD - Primary    Panel 2:     * Gus Castellano MD - Primary    Local/Dose/Irrigation: SEE MED REC                    Peripheral IV 08/14/18 Right Antecubital (Active)          Oliverio-Mitchell Drain 08/14/18 Left Breast (Active)      Airway - Endotracheal Tube 08/14/18 Oral (Active)                   Dressing/Packing:  Wound Chest Right-DRESSING TYPE: Topical skin adhesive/glue (08/14/18 2586)  Wound Breast-DRESSING TYPE: 4 x 4;ABD pad;Bra;Non-adherent; Topical skin adhesive/glue (08/14/18 2425)  Splint/Cast:  ]    Other:

## 2018-08-15 VITALS
HEART RATE: 79 BPM | RESPIRATION RATE: 18 BRPM | SYSTOLIC BLOOD PRESSURE: 99 MMHG | HEIGHT: 68 IN | WEIGHT: 146 LBS | DIASTOLIC BLOOD PRESSURE: 62 MMHG | OXYGEN SATURATION: 96 % | TEMPERATURE: 98.2 F | BODY MASS INDEX: 22.13 KG/M2

## 2018-08-15 PROCEDURE — 99218 HC RM OBSERVATION: CPT

## 2018-08-15 PROCEDURE — 74011250637 HC RX REV CODE- 250/637: Performed by: SURGERY

## 2018-08-15 PROCEDURE — 74011250636 HC RX REV CODE- 250/636: Performed by: SURGERY

## 2018-08-15 RX ADMIN — CEFAZOLIN SODIUM 2 G: 2 SOLUTION INTRAVENOUS at 03:45

## 2018-08-15 RX ADMIN — OXYCODONE HYDROCHLORIDE AND ACETAMINOPHEN 1 TABLET: 5; 325 TABLET ORAL at 06:21

## 2018-08-15 NOTE — PROGRESS NOTES
I have reviewed discharge instructions with the patient and spouse. The patient and spouse verbalized understanding. IV removed.

## 2018-08-15 NOTE — PROGRESS NOTES
Bedside and Verbal shift change report given to Dayne Oliveros (oncoming nurse) by So Arthur (offgoing nurse). Report included the following information SBAR, Intake/Output, MAR, Accordion and Recent Results.

## 2018-08-15 NOTE — PROGRESS NOTES
Doing ok was a little nauseated last night but better today    AVSS    HUA serosanguineous    Flaps ok, left nipple a little dusky    Will discuss with Dr. Veronica Encinas. Ok to discharge today.

## 2018-08-15 NOTE — OP NOTES
[address] [facility]  OPERATIVE REPORT    Greg Santiago  MR#: [id number]  : 1975  ACCOUNT #: [de-identified]   DATE OF SERVICE: 2018    PREOPERATIVE DIAGNOSES:  Left breast cancer with axillary metastasis, need for central access for intravenous adjuvant chemotherapy. POSTOPERATIVE DIAGNOSES:  Left breast cancer with axillary metastasis, need for central access for intravenous adjuvant chemotherapy. PROCEDURES PERFORMED:  Left skin and nipple-sparing mastectomy, right subclavian port insertion. SURGEON:  Elias Price MD    ASSISTANT:  Joe Badillo     ANESTHESIA:  General.    ESTIMATED BLOOD LOSS:  100 mL. SPECIMENS REMOVED:  Left mastectomy. FINDINGS:  Tip of port at junction of SVC and atrium on the right. COMPLICATIONS:  None. IMPLANTS:  An 8-[employer]. INDICATION FOR PROCEDURE:  This 51-year-old female was found to have a left multicentric breast cancer with axillary metastases. She previously had a back of nipple biopsy and a nipple delay, as well as a sentinel lymph node biopsy with completion lymphadenectomy on the left. She was evaluated by Medical Oncology and the decision was made for port placements for adjuvant chemo and to complete her surgery with a mastectomy. PROCEDURE IN DETAIL:  Initially, the patient was seen in the preop holding area where surgical site was marked by surgeon. Informed consent was obtained. She was taken to the operating room and laid in supine position where general endotracheal anesthesia was induced. Bilateral chest wall breasts were prepped and draped in the usual fashion. A timeout was performed. The patient was placed in Trendelenburg position and 10 mL of local anesthetic was injected in the right chest wall. An 18-gauge introducer needle was used to access the subclavian vein on the right. This was done very easily.   The syringe was removed and the wire was threaded through the needle. The needle was removed and the wire was seen to go toward the SVC and right atrium on fluoroscopy. Next, an incision was made with a 15 blade. Bovie cautery was used to dissect the port pocket with Bovie cautery. A dilator sheath was threaded over the wire. The inner cannula and wire were removed. The port catheter was clamped and threaded through the dilator sheath and the dilator sheath was torn away at 16 cm at the chest wall. The tip of the port was at the junction of the SVC and right atrium. The catheter was clamped, cut, and placed onto the port. The port was placed in the pocket and secured on either side with a 3-0 Prolene. The port aspirated and flushed well with injectable saline and final heparin flush. The incision was closed with interrupted 3-0 Vicryl and 4-0 subcuticular Monocryl and Dermabond was placed on the skin. Next, attention was turned to the left breast and using the prior incision, a 10 blade was used to open the prior inframammary fold incision. The suction was used to suction the seroma from the pre-existing cavity. Bovie cautery was used to dissect the breast circumferentially on the superior flap and then off the chest wall from an inferior to superior fashion. The breast was removed in total with the Bovie cautery from medial to lateral and marked short superior, long stitch lateral, staples marking the anterior margin. The cavity was irrigated. Bovie cautery was used to obtain hemostasis and a moist lap was prepped in the cavity in preparation for Dr. Esau Gitelman part of the procedure. All sponge, needle, and instrument counts were correct. The patient was stable during this time.       MD FERMIN Reyes / VLADIMIR  D: 08/14/2018 15:21     T: 08/14/2018 21:27  JOB #: [id number]

## 2018-08-15 NOTE — DISCHARGE INSTRUCTIONS
Discharge Instructions from Dr. Niki Doan    · I will call you with the pathology results, typically within 1 week from today. · You may shower, but no hot tubs, swimming pools, or baths until your incision is healed. · No heavy lifting with the affected extremity (nothing greater than 5 pounds), and limit its use for the next 4-5 days. · NO ICE  · If I placed a drain, follow the drain instructions provided, especially as you keep a record of the drain output. · Follow medication instructions carefully. · Watch for signs of infection as listed below. · Redness  · Swelling  · Drainage from the incision or from your nipple that appears infected  · Fever over 101 degrees for consecutive readings, or over 99.5 if you are currently undergoing chemotherapy. · Call our office (number is below) for a follow-up appointment. · If you have any problems, our phone number is [phone].

## 2018-08-20 ENCOUNTER — OFFICE VISIT (OUTPATIENT)
Dept: SURGERY | Age: 43
End: 2018-08-20

## 2018-08-20 ENCOUNTER — TELEPHONE (OUTPATIENT)
Dept: ONCOLOGY | Age: 43
End: 2018-08-20

## 2018-08-20 VITALS
WEIGHT: 146 LBS | HEART RATE: 74 BPM | BODY MASS INDEX: 22.13 KG/M2 | SYSTOLIC BLOOD PRESSURE: 122 MMHG | HEIGHT: 68 IN | DIASTOLIC BLOOD PRESSURE: 69 MMHG

## 2018-08-20 DIAGNOSIS — C77.3 BREAST CANCER METASTASIZED TO AXILLARY LYMPH NODE, LEFT (HCC): ICD-10-CM

## 2018-08-20 DIAGNOSIS — Z17.0 MALIGNANT NEOPLASM OF LOWER-OUTER QUADRANT OF LEFT BREAST OF FEMALE, ESTROGEN RECEPTOR POSITIVE (HCC): Primary | ICD-10-CM

## 2018-08-20 DIAGNOSIS — C50.512 MALIGNANT NEOPLASM OF LOWER-OUTER QUADRANT OF LEFT BREAST OF FEMALE, ESTROGEN RECEPTOR POSITIVE (HCC): Primary | ICD-10-CM

## 2018-08-20 DIAGNOSIS — C50.912 BREAST CANCER METASTASIZED TO AXILLARY LYMPH NODE, LEFT (HCC): ICD-10-CM

## 2018-08-20 NOTE — TELEPHONE ENCOUNTER
Dr. Suzanne Marie telephone  Received: 3 days ago       [address] [place] [address] Office                   The pt called and stated she choose another doctor and doesn't need to schedule an appt.        Best contact number is [phone]

## 2018-08-20 NOTE — MR AVS SNAPSHOT
[id number] [address] [facility] 
[phone] Patient: Mathew Mesa MRN: [id number] :1975 Visit Information Date & Time Provider Department Dept. Phone Encounter #  
 2018 10:20 AM Reynaldo Alex MD [address] at [facility] [id number] Your Appointments 10/12/2018  8:30 AM  
[facility] Appt Note: 6 week follow up per Dr. Jt Collins CP? 18 ldw; 6 week follow up per Dr. Jt Collins CP? 18 ldw; POST OP/CP$/ST  
 [facility] [address] [id number]  
[address] Upcoming Health Maintenance Date Due Pneumococcal 19-64 Highest Risk (1 of 3 - PCV13) 10/25/1994 DTaP/Tdap/Td series (1 - Tdap) 10/25/1996 PAP AKA CERVICAL CYTOLOGY 10/25/1996 Influenza Age 5 to Adult 2018 Allergies as of 2018  Review Complete On: 2018 By: Mei Albarado RN No Known Allergies Current Immunizations  Never Reviewed No immunizations on file. Not reviewed this visit Vitals BP Pulse Height(growth percentile) Weight(growth percentile) LMP BMI  
 122/69 74 5' 8\" (1.727 m) 146 lb (66.2 kg) 2018 22.2 kg/m2 OB Status Smoking Status Having regular periods Former Smoker Vitals History BMI and BSA Data Body Mass Index Body Surface Area  
 22.2 kg/m 2 1.78 m 2 Preferred Pharmacy Pharmacy Name Phone [facility] [address], [address] [facility]. [phone] Your Updated Medication List  
  
   
This list is accurate as of 18 12:36 PM.  Always use your most recent med list.  
  
  
  
  
 diazePAM 5 mg tablet Commonly known as:  VALIUM Take 1 Tab by mouth every six (6) hours as needed for Anxiety (muscle spasm). Max Daily Amount: 20 mg.  
  
 levothyroxine 125 mcg tablet Commonly known as:  SYNTHROID Take 137 mcg by mouth Daily (before breakfast). PT TAKES AT NIGHT  Indications: hypothyroidism  
  
 naloxone 2 mg/actuation Spry Commonly known as:  [employer] Use 1 spray intranasally, then discard. Repeat with new spray every 2 min as needed for opioid overdose symptoms, alternating nostrils. NITRO-BID 2 % ointment Generic drug:  nitroglycerin * ondansetron 4 mg disintegrating tablet Commonly known as:  ZOFRAN ODT Take 1 Tab by mouth every eight (8) hours as needed for Nausea. * ondansetron 4 mg disintegrating tablet Commonly known as:  ZOFRAN ODT Take 1 Tab by mouth every eight (8) hours as needed for Nausea. * oxyCODONE-acetaminophen 5-325 mg per tablet Commonly known as:  PERCOCET Take 1 Tab by mouth every four (4) hours as needed for Pain. Max Daily Amount: 6 Tabs. * oxyCODONE-acetaminophen 5-325 mg per tablet Commonly known as:  PERCOCET Take 1 Tab by mouth every four (4) hours as needed for Pain. Max Daily Amount: 6 Tabs. * Notice: This list has 4 medication(s) that are the same as other medications prescribed for you. Read the directions carefully, and ask your doctor or other care provider to review them with you. Introducing [facility]! Dear Rosalina Fabian: 
Thank you for requesting a [employer] account. Our records indicate that you already have an active [employer] account. You can access your account anytime at [email or web address] Did you know that you can access your hospital and ER discharge instructions at any time in [employer]? You can also review all of your test results from your hospital stay or ER visit. Additional Information If you have questions, please visit the Frequently Asked Questions section of the [employer] website at [email or web address] Remember, [employer] is NOT to be used for urgent needs. For medical emergencies, dial 911. Now available from your iPhone and Android! Please provide this summary of care documentation to your next provider. Your primary care clinician is listed as Judith Marks. If you have any questions after today's visit, please call [phone].

## 2018-08-20 NOTE — PROGRESS NOTES
HISTORY OF PRESENT ILLNESS  Nishant Ashley is a 43 y.o. female. HPI  ESTABLISHED patient here for post op follow up LEFT breast cancer, s/p Mastectomy with reconstruction. Denies pain. She is feeling stiff and having trouble moving her arm. Incisions are healing and dry and intact. Continues Hyperbaric treatments. HUA drains still in place, continues to take an antibiotic while drains in place. Has an appointment with Dr. Hannah Dowling today and hopes the drain comes out. 42 yo female with left breast T2 N0  invasive carcinoma with lobular features, grade 2, ER positive, AK positive, HER 2 negative. Also lcis and papilloma on path. 7/31/18 - nipple delay and ALND, +4/20 LN, Dr. Brock Russell and Dr. Hannah Dowling. Tumor 3.0 cm   Margins clear. T2 N2.       LEFT breast skin and nipple sparing mastectomy with reconstrction - 8/14/18. Port inserted     Dr. Isabella Walters chemotherapy  Dr. Judy Munoz -   Review of Systems   All other systems reviewed and are negative. Physical Exam   Pulmonary/Chest:       Right port intact. Right breast incision c/d/i. Left breast incision c/d/i drain intact with serous fluid  Left axillary incision c/d/i. Nipple viable on left. Nursing note and vitals reviewed. ASSESSMENT and PLAN    ICD-10-CM ICD-9-CM    1. Malignant neoplasm of lower-outer quadrant of left breast of female, estrogen receptor positive (HCC) C50.512 174.5     Z17.0 V86.0    2. Breast cancer metastasized to axillary lymph node, left (HCC) C50.912 174.9     C77.3 196.3      42 yo female with left breast T2 N2 ILC and LCIS grade 2 Er/Pr + Her 2 pal negative. mammaprint high risk. She has decided to go with Dr. Yana Rao for med onc. Patient healing well. Continue hyperbaric 02. Sees dr. Sera Emmanuel today. She has seen dr. Judy Munoz in rad onc. Plan is chemo followed by xrt. F/u with me in 6-8 weeks. Will start pt for left arm limited rom.

## 2018-08-21 DIAGNOSIS — Z17.0 MALIGNANT NEOPLASM OF LOWER-OUTER QUADRANT OF LEFT BREAST OF FEMALE, ESTROGEN RECEPTOR POSITIVE (HCC): ICD-10-CM

## 2018-08-21 DIAGNOSIS — C77.3 BREAST CANCER METASTASIZED TO AXILLARY LYMPH NODE, LEFT (HCC): Primary | ICD-10-CM

## 2018-08-21 DIAGNOSIS — C50.912 BREAST CANCER METASTASIZED TO AXILLARY LYMPH NODE, LEFT (HCC): Primary | ICD-10-CM

## 2018-08-21 DIAGNOSIS — C50.512 MALIGNANT NEOPLASM OF LOWER-OUTER QUADRANT OF LEFT BREAST OF FEMALE, ESTROGEN RECEPTOR POSITIVE (HCC): ICD-10-CM

## 2018-10-12 ENCOUNTER — OFFICE VISIT (OUTPATIENT)
Dept: SURGERY | Age: 43
End: 2018-10-12

## 2018-10-12 VITALS
BODY MASS INDEX: 22.58 KG/M2 | SYSTOLIC BLOOD PRESSURE: 108 MMHG | DIASTOLIC BLOOD PRESSURE: 60 MMHG | HEIGHT: 68 IN | WEIGHT: 149 LBS | HEART RATE: 68 BPM

## 2018-10-12 DIAGNOSIS — C50.512 MALIGNANT NEOPLASM OF LOWER-OUTER QUADRANT OF LEFT BREAST OF FEMALE, ESTROGEN RECEPTOR POSITIVE (HCC): Primary | ICD-10-CM

## 2018-10-12 DIAGNOSIS — Z17.0 MALIGNANT NEOPLASM OF LOWER-OUTER QUADRANT OF LEFT BREAST OF FEMALE, ESTROGEN RECEPTOR POSITIVE (HCC): Primary | ICD-10-CM

## 2018-10-12 DIAGNOSIS — C50.912 BREAST CANCER METASTASIZED TO AXILLARY LYMPH NODE, LEFT (HCC): ICD-10-CM

## 2018-10-12 DIAGNOSIS — C77.3 BREAST CANCER METASTASIZED TO AXILLARY LYMPH NODE, LEFT (HCC): ICD-10-CM

## 2018-10-12 RX ORDER — LEVOTHYROXINE SODIUM 150 MCG
150 TABLET ORAL
COMMUNITY
Start: 2018-09-26 | End: 2019-08-26

## 2018-10-12 RX ORDER — ONDANSETRON 8 MG/1
TABLET, ORALLY DISINTEGRATING ORAL
COMMUNITY
Start: 2018-08-27 | End: 2019-02-08 | Stop reason: ALTCHOICE

## 2018-10-12 RX ORDER — LEVOFLOXACIN 500 MG/1
TABLET, FILM COATED ORAL
Status: ON HOLD | COMMUNITY
Start: 2018-09-24 | End: 2018-10-25 | Stop reason: SDUPTHER

## 2018-10-12 RX ORDER — PROCHLORPERAZINE MALEATE 10 MG
TABLET ORAL
COMMUNITY
Start: 2018-09-27 | End: 2019-02-08 | Stop reason: ALTCHOICE

## 2018-10-12 NOTE — PATIENT INSTRUCTIONS
Breast Cancer: Care Instructions  Your Care Instructions    Breast cancer occurs when abnormal cells grow out of control in the breast. These cancer cells can spread within the breast, to nearby lymph nodes and other tissues, and to other parts of the body. Being treated for cancer can weaken your body, and you may feel very tired. Get the rest your body needs so you can feel better. Finding out that you have cancer is scary. You may feel many emotions and may need some help coping. Seek out family, friends, and counselors for support. You also can do things at home to make yourself feel better while you go through treatment. Call the [employer] (2-369.782.2067) or visit its website at [employer] for more information. Follow-up care is a key part of your treatment and safety. Be sure to make and go to all appointments, and call your doctor if you are having problems. It's also a good idea to know your test results and keep a list of the medicines you take. How can you care for yourself at home? · Take your medicines exactly as prescribed. Call your doctor if you think you are having a problem with your medicine. You may get medicine for nausea and vomiting if you have these side effects. · Follow your doctor's instructions to relieve pain. Pain from cancer and surgery can almost always be controlled. Use pain medicine when you first notice pain, before it becomes severe. · Eat healthy food. If you do not feel like eating, try to eat food that has protein and extra calories to keep up your strength and prevent weight loss. Drink liquid meal replacements for extra calories and protein. Try to eat your main meal early. · Get some physical activity every day, but do not get too tired. Keep doing the hobbies you enjoy as your energy allows. · Do not smoke. Smoking can make your cancer worse. If you need help quitting, talk to your doctor about stop-smoking programs and medicines.  These can increase your chances of quitting for good. · Take steps to control your stress and workload. Learn relaxation techniques. ¨ Share your feelings. Stress and tension affect our emotions. By expressing your feelings to others, you may be able to understand and cope with them. ¨ Consider joining a support group. Talking about a problem with your spouse, a good friend, or other people with similar problems is a good way to reduce tension and stress. ¨ Express yourself through art. Try writing, crafts, dance, or art to relieve stress. Some dance, writing, or art groups may be available just for people who have cancer. ¨ Be kind to your body and mind. Getting enough sleep, eating a healthy diet, and taking time to do things you enjoy can contribute to an overall feeling of balance in your life and can help reduce stress. ¨ Get help if you need it. Discuss your concerns with your doctor or counselor. · If you are vomiting or have diarrhea:  ¨ Drink plenty of fluids (enough so that your urine is light yellow or clear like water) to prevent dehydration. Choose water and other caffeine-free clear liquids. If you have kidney, heart, or liver disease and have to limit fluids, talk with your doctor before you increase the amount of fluids you drink. ¨ When you are able to eat, try clear soups, mild foods, and liquids until all symptoms are gone for 12 to 48 hours. Other good choices include dry toast, crackers, cooked cereal, and gelatin dessert, such as Jell-O.  · If you have not already done so, prepare a list of advance directives. Advance directives are instructions to your doctor and family members about what kind of care you want if you become unable to speak or express yourself. When should you call for help? Call 911 anytime you think you may need emergency care.  For example, call if:    · You passed out (lost consciousness).    Call your doctor now or seek immediate medical care if:    · You have a fever.     · You have abnormal bleeding.     · You think you have an infection.     · You have new or worse pain.     · You have new symptoms, such as a cough, belly pain, vomiting, diarrhea, or a rash.    Watch closely for changes in your health, and be sure to contact your doctor if:    · You are much more tired than usual.     · You have swollen glands in your armpits, groin, or neck.     · You do not get better as expected. Where can you learn more? Go to http://cesar-tracee.info/. Enter V321 in the search box to learn more about \"Breast Cancer: Care Instructions. \"  Current as of: March 28, 2018  Content Version: 11.8  © 6041-8786 [employer]. Care instructions adapted under license by [employer] (which disclaims liability or warranty for this information). If you have questions about a medical condition or this instruction, always ask your healthcare professional. [address] any warranty or liability for your use of this information.

## 2018-10-12 NOTE — PROGRESS NOTES
HISTORY OF PRESENT ILLNESS  Matthew Marin is a 43 y.o. female. HPI ESTABLISHED patient here for follow up LEFT breast cancer. She is currently undergoing adjuvant chemotherapy. Has completed 2 cycles of AC and has 2 more cycles left, then will receive 12 weeks Taxol. Dr. Tiffany Meneses is her oncologist. Overall feeling well. Biggest complaints are lethargy, nausea, and body aches after Neulesta. Nausea medications help. No breast pain. Completed 6 treatments of hyperbaric oxygen therapy and follows up with Dr. Judith Ye next week. Breast cancer history-   7/31/18 - nipple delay and ALND, +4/20 LN, Dr. Jose Wang and Dr. Judith Ye. 8/14/18 - LEFT breast skin and nipple sparing mastectomy with reconstruction, port insertion, RIGHT breast augmentation. Tumor 3.0 cm. 42 yo female with left breast T2 N2 ILC and LCIS grade 2 Er/Pr + Her 2 pal negative. mammaprint high risk. Margins clear. Adjuvant chemotherapy AC x 4 and 12 weeks Taxol - Dr. Tiffany Lino is her radiation oncologist.    Family history-  No family history of breast or ovarian cancer. Her mother had non-hodgkin's lymphoma. 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 Breast imaging-  6/25/18 Breast MRI. Screening mammogram was performed 11/17/17: BI-RADS 1. LEFT breast diagnostic mammogram and LEFT breast ultrasound done 6/14/18: BI-RADS 5. 2.1 cm hypoechoic mass 6:00 left breast.      Review of Systems   All other systems reviewed and are negative. Physical Exam   Pulmonary/Chest: Right breast exhibits no inverted nipple, no mass, no nipple discharge, no skin change and no tenderness. Left breast exhibits no inverted nipple, no mass, no nipple discharge, no skin change and no tenderness. Breasts are symmetrical.       Left axilla no masses. Good rom in both arms  No lymphedema   Lymphadenopathy:     She has no cervical adenopathy. She has no axillary adenopathy. Nursing note and vitals reviewed.       ASSESSMENT and PLAN    ICD-10-CM ICD-9-CM 1. Malignant neoplasm of lower-outer quadrant of left breast of female, estrogen receptor positive (HCC) C50.512 174.5     Z17.0 V86.0    2. Breast cancer metastasized to axillary lymph node, left (HCC) C50.912 174.9     C77.3 196.3      - no evidence local recurrence  - f/u in 6 months  - continue chemo.  Will then have xrt after chemo

## 2018-10-23 ENCOUNTER — APPOINTMENT (OUTPATIENT)
Dept: GENERAL RADIOLOGY | Age: 43
DRG: 809 | End: 2018-10-23
Attending: INTERNAL MEDICINE
Payer: COMMERCIAL

## 2018-10-23 ENCOUNTER — HOSPITAL ENCOUNTER (INPATIENT)
Age: 43
LOS: 2 days | Discharge: HOME | DRG: 809 | End: 2018-10-25
Attending: INTERNAL MEDICINE | Admitting: INTERNAL MEDICINE
Payer: COMMERCIAL

## 2018-10-23 PROBLEM — D70.9 NEUTROPENIC FEVER (HCC): Status: ACTIVE | Noted: 2018-10-23

## 2018-10-23 PROBLEM — R50.81 NEUTROPENIC FEVER (HCC): Status: ACTIVE | Noted: 2018-10-23

## 2018-10-23 LAB
ALBUMIN SERPL-MCNC: 3.7 G/DL (ref 3.5–5)
ALBUMIN/GLOB SERPL: 1.1 {RATIO} (ref 1.1–2.2)
ALP SERPL-CCNC: 106 U/L (ref 45–117)
ALT SERPL-CCNC: 14 U/L (ref 12–78)
ANION GAP SERPL CALC-SCNC: 7 MMOL/L (ref 5–15)
APPEARANCE UR: CLEAR
AST SERPL-CCNC: 6 U/L (ref 15–37)
BACTERIA URNS QL MICRO: NEGATIVE /HPF
BASOPHILS # BLD: 0 K/UL (ref 0–0.1)
BASOPHILS NFR BLD: 2 % (ref 0–1)
BILIRUB SERPL-MCNC: 0.3 MG/DL (ref 0.2–1)
BILIRUB UR QL: NEGATIVE
BUN SERPL-MCNC: 10 MG/DL (ref 6–20)
BUN/CREAT SERPL: 13 (ref 12–20)
CALCIUM SERPL-MCNC: 8.4 MG/DL (ref 8.5–10.1)
CHLORIDE SERPL-SCNC: 109 MMOL/L (ref 97–108)
CO2 SERPL-SCNC: 25 MMOL/L (ref 21–32)
COLOR UR: ABNORMAL
CREAT SERPL-MCNC: 0.75 MG/DL (ref 0.55–1.02)
DIFFERENTIAL METHOD BLD: ABNORMAL
EOSINOPHIL # BLD: 0 K/UL (ref 0–0.4)
EOSINOPHIL NFR BLD: 0 % (ref 0–7)
EPITH CASTS URNS QL MICRO: ABNORMAL /LPF
ERYTHROCYTE [DISTWIDTH] IN BLOOD BY AUTOMATED COUNT: 13.8 % (ref 11.5–14.5)
GLOBULIN SER CALC-MCNC: 3.4 G/DL (ref 2–4)
GLUCOSE SERPL-MCNC: 84 MG/DL (ref 65–100)
GLUCOSE UR STRIP.AUTO-MCNC: NEGATIVE MG/DL
HCT VFR BLD AUTO: 27.5 % (ref 35–47)
HGB BLD-MCNC: 8.9 G/DL (ref 11.5–16)
HGB UR QL STRIP: ABNORMAL
HYALINE CASTS URNS QL MICRO: ABNORMAL /LPF (ref 0–5)
IMM GRANULOCYTES # BLD: 0 K/UL (ref 0–0.04)
IMM GRANULOCYTES NFR BLD AUTO: 0 % (ref 0–0.5)
KETONES UR QL STRIP.AUTO: NEGATIVE MG/DL
LACTATE SERPL-SCNC: 2 MMOL/L (ref 0.4–2)
LEUKOCYTE ESTERASE UR QL STRIP.AUTO: NEGATIVE
LYMPHOCYTES # BLD: 0.5 K/UL (ref 0.8–3.5)
LYMPHOCYTES NFR BLD: 35 % (ref 12–49)
MCH RBC QN AUTO: 29.8 PG (ref 26–34)
MCHC RBC AUTO-ENTMCNC: 32.4 G/DL (ref 30–36.5)
MCV RBC AUTO: 92 FL (ref 80–99)
MONOCYTES # BLD: 0.5 K/UL (ref 0–1)
MONOCYTES NFR BLD: 41 % (ref 5–13)
NEUTS BAND NFR BLD MANUAL: 2 %
NEUTS SEG # BLD: 0.3 K/UL (ref 1.8–8)
NEUTS SEG NFR BLD: 20 % (ref 32–75)
NITRITE UR QL STRIP.AUTO: NEGATIVE
NRBC # BLD: 0.03 K/UL (ref 0–0.01)
NRBC BLD-RTO: 2.3 PER 100 WBC
PH UR STRIP: 8 [PH] (ref 5–8)
PLATELET # BLD AUTO: 152 K/UL (ref 150–400)
PMV BLD AUTO: 9.9 FL (ref 8.9–12.9)
POTASSIUM SERPL-SCNC: 3.2 MMOL/L (ref 3.5–5.1)
PROT SERPL-MCNC: 7.1 G/DL (ref 6.4–8.2)
PROT UR STRIP-MCNC: NEGATIVE MG/DL
RBC # BLD AUTO: 2.99 M/UL (ref 3.8–5.2)
RBC #/AREA URNS HPF: ABNORMAL /HPF (ref 0–5)
RBC MORPH BLD: ABNORMAL
SODIUM SERPL-SCNC: 141 MMOL/L (ref 136–145)
SP GR UR REFRACTOMETRY: 1.01 (ref 1–1.03)
UROBILINOGEN UR QL STRIP.AUTO: 0.2 EU/DL (ref 0.2–1)
WBC # BLD AUTO: 1.3 K/UL (ref 3.6–11)
WBC URNS QL MICRO: ABNORMAL /HPF (ref 0–4)

## 2018-10-23 PROCEDURE — 71046 X-RAY EXAM CHEST 2 VIEWS: CPT

## 2018-10-23 PROCEDURE — 99218 HC RM OBSERVATION: CPT

## 2018-10-23 PROCEDURE — 87086 URINE CULTURE/COLONY COUNT: CPT | Performed by: INTERNAL MEDICINE

## 2018-10-23 PROCEDURE — 74011250636 HC RX REV CODE- 250/636: Performed by: INTERNAL MEDICINE

## 2018-10-23 PROCEDURE — 80053 COMPREHEN METABOLIC PANEL: CPT | Performed by: INTERNAL MEDICINE

## 2018-10-23 PROCEDURE — 83605 ASSAY OF LACTIC ACID: CPT | Performed by: INTERNAL MEDICINE

## 2018-10-23 PROCEDURE — 65270000029 HC RM PRIVATE

## 2018-10-23 PROCEDURE — 74011000258 HC RX REV CODE- 258: Performed by: INTERNAL MEDICINE

## 2018-10-23 PROCEDURE — 36415 COLL VENOUS BLD VENIPUNCTURE: CPT | Performed by: INTERNAL MEDICINE

## 2018-10-23 PROCEDURE — 77030027138 HC INCENT SPIROMETER -A

## 2018-10-23 PROCEDURE — 87040 BLOOD CULTURE FOR BACTERIA: CPT | Performed by: INTERNAL MEDICINE

## 2018-10-23 PROCEDURE — 85025 COMPLETE CBC W/AUTO DIFF WBC: CPT | Performed by: INTERNAL MEDICINE

## 2018-10-23 PROCEDURE — 81001 URINALYSIS AUTO W/SCOPE: CPT | Performed by: INTERNAL MEDICINE

## 2018-10-23 RX ORDER — LORAZEPAM 2 MG/ML
1 INJECTION INTRAMUSCULAR
Status: DISCONTINUED | OUTPATIENT
Start: 2018-10-23 | End: 2018-10-25 | Stop reason: HOSPADM

## 2018-10-23 RX ORDER — POLYETHYLENE GLYCOL 3350 17 G/17G
17 POWDER, FOR SOLUTION ORAL
Status: DISCONTINUED | OUTPATIENT
Start: 2018-10-23 | End: 2018-10-25 | Stop reason: HOSPADM

## 2018-10-23 RX ORDER — SODIUM CHLORIDE 9 MG/ML
75 INJECTION, SOLUTION INTRAVENOUS CONTINUOUS
Status: DISCONTINUED | OUTPATIENT
Start: 2018-10-23 | End: 2018-10-25

## 2018-10-23 RX ORDER — ACETAMINOPHEN 325 MG/1
650 TABLET ORAL
Status: DISCONTINUED | OUTPATIENT
Start: 2018-10-23 | End: 2018-10-25 | Stop reason: HOSPADM

## 2018-10-23 RX ORDER — ONDANSETRON 4 MG/1
4 TABLET, ORALLY DISINTEGRATING ORAL
Status: DISCONTINUED | OUTPATIENT
Start: 2018-10-23 | End: 2018-10-25 | Stop reason: HOSPADM

## 2018-10-23 RX ORDER — LEVOTHYROXINE SODIUM 150 UG/1
150 TABLET ORAL DAILY
Status: DISCONTINUED | OUTPATIENT
Start: 2018-10-24 | End: 2018-10-25 | Stop reason: HOSPADM

## 2018-10-23 RX ADMIN — CEFEPIME HYDROCHLORIDE 2 G: 2 INJECTION, POWDER, FOR SOLUTION INTRAVENOUS at 16:40

## 2018-10-23 RX ADMIN — CEFEPIME HYDROCHLORIDE 2 G: 2 INJECTION, POWDER, FOR SOLUTION INTRAVENOUS at 20:16

## 2018-10-23 RX ADMIN — SODIUM CHLORIDE 75 ML/HR: 900 INJECTION, SOLUTION INTRAVENOUS at 13:45

## 2018-10-23 NOTE — ACP (ADVANCE CARE PLANNING)
Responded to In [employer] request for Advance Medical Directive (AMD) consult. Patient's  was in the room. They are familiar with the document. Mr. Ysabel Gu stated he thinks they have one at home that has not been completed. Offered to leave an AMD for their review and advised them  can return to assist if Mrs. Villasenor would like to complete it during this admission.   Britta Diallo, [facility], [address] [facility], [address] [place] [address] [employer] Paging Service  [phone])

## 2018-10-23 NOTE — H&P
Oncology History and Physical 
 
Chief Complaint: neutropenic fever Neutropenic fever ([address] HPI:  
 
Juan Myers is a 43 yr old female referred by Dr. Aurelia Dakin with breast cancer. She noted \"weird sensations\" in her left breast during her mensutrual 
cycle and was referred for a diagnostic mammogram and ultrasound of the left breast on 6/14/18 at the [facility] which revealed 
a 2.1 cm hypoechoic oval mass at 6 o'c'clock in the left breast. She then underwent a left breast biopsy also on 6/14/18 with pathology 
revealed an invasive carcinoma with lobular features, grade 2, ER positive at 100%, AK positive at 90%, Ki67 = 10% (low) , nuclear grade 2, and her 2 pal negative. She underwent breast MRI on 6/25/18 which revealed the known malignancy on the left with a 2.5 cm mass 
with an associated biopsy clip located within the posterior third of the LOQ of the left breast at 5 o'clock, with fingerlike extension into 
much of the LOQ. There was a second mass 1 cm in size within the far posterior and medial aspect of the left breast at approximately 89 
o'clock for which biopsy was recommended if she was contemplating breast conservation. en She underwent LND and posterior nipple biopsy on 7/31/18 by Dr. Nadine Glover with pathology revealing that both sentinel nodes were positive (3 
mm and 11 mm in extent) as well as 2 of 20 nodes removed in the axillary dissection (largest metastatic focus up to 4 mm with extranodal 
invasion). ER 90%, AK 80% and her 2 pal 2+ (equivocal). She was scheduled for a nipple sparing mastectomy on the left with reconstruction on 8/14/18. .  
The pathology report from her surgery by Dr. Nadine Glover on 8/14/18 reported a 3 cm invasive mammary carcinoma with lobular features, overall 
grade 2, giving her pT2, pN2a. She returned to the office 8/27/18 and reported that she was healing well from her surgery. She got hyperbaric oxygen treatment x 6 for 
her nipple which turned black. She started dose dense AC followed by taxol. She had severe neutorpenia with the first and second cycle despite neulasta. She took 5 days of prophylactic Levaquin 500 mg after both C1 and C2. Today pt came for NP evaluation s/p C3 AC received on 10/15/18 with Neulasta 10/16/18. She reports that she started having chills during the night and spiked a fever of 101.7 this morning. Patient Active Problem List  
 Diagnosis Date Noted  Neutropenic fever ([facility] [address] 10/23/2018  Breast cancer metastasized to axillary lymph node, left ([address] 2018  Malignant neoplasm of lower-outer quadrant of left breast of female, estrogen receptor positive ([facility] [address] 2018  Hypothyroidism 2012 Past Medical History:  
Diagnosis Date  Acquired hypothyroidism   
 on levothyroxine  Anemia NEC   
 slow fe  Cancer ([address] LEFT BREAST  GERD (gastroesophageal reflux disease)  Hashimoto thyroiditis, fibrous variant  HX OTHER MEDICAL   
  live baby boy  Past Surgical History:  
Procedure Laterality Date  HX HEENT    
 WISDOM TEETH EXTRACTION  
 HX OTHER SURGICAL    
 facial surgery Current Facility-Administered Medications Medication Dose Route Frequency  [START ON 10/24/2018] levothyroxine (SYNTHROID) tablet 150 mcg  150 mcg Oral DAILY  LORazepam (ATIVAN) injection 1 mg  1 mg IntraVENous Q4H PRN  
 0.9% sodium chloride infusion  75 mL/hr IntraVENous CONTINUOUS  
 ondansetron (ZOFRAN ODT) tablet 4 mg  4 mg Oral Q4H PRN  polyethylene glycol (MIRALAX) packet 17 g  17 g Oral QHS PRN  
 cefepime (MAXIPIME) 2 g in 0.9% sodium chloride (MBP/ADV) 100 mL  2 g IntraVENous Q8H  
 acetaminophen (TYLENOL) tablet 650 mg  650 mg Oral Q4H PRN  
 influenza vaccine 2018- (6 mos+)(PF) (FLUARIX QUAD/FLULAVAL QUAD) injection 0.5 mL  0.5 mL IntraMUSCular PRIOR TO DISCHARGE No Known Allergies Social History Tobacco Use  Smoking status: Former Smoker Last attempt to quit:  Years since quittin.8  Smokeless tobacco: Never Used Substance Use Topics  Alcohol use: No  
  
Family History Problem Relation Age of Onset Dena Sveta Arthritis-osteo Mother  Cancer Mother   
     non-hodgkin's lymphoma  Heart Disease Father  No Known Problems Brother  Anesth Problems Neg Hx Review of Systems:  
Constitutional No  night sweats, excessive fatigue or weight loss. Allergic/Immunologic No recent allergic reactions Eyes No significant visual difficulties. No diplopia. ENMT No problems with hearing, no sore throat, no sinus drainage. Endocrine No hot flashes or night sweats. No cold intolerance, polyuria, or polydipsia Hematologic/Lymphatic No easy bruising or bleeding. The patient denies any tender or palpable lymph nodes Breasts No abnormal masses of breast, nipple discharge or pain. Respiratory No dyspnea on exertion, orthopnea, chest pain, cough or hemoptysis. Cardiovascular No anginal chest pain, irregular heart beat, tachycardia, palpitations or orthopnea. Gastrointestinal No  vomiting, diarrhea, constipation, cramping, dysphagia, reflux, heartburn, GI bleeding, or early satiety. No change in bowel habits. Mild nausea this AM  
Genitourinary (M) No hematuria, dysuria, increased frequency, urgency, hesitancy or incontinence. Musculoskeletal No joint pain, swelling or redness. No decreased range of motion. Integumentary No chronic rashes, inflammation, ulcerations, pruritus, petechiae, purpura, ecchymoses, or skin changes. Neurologic No headache, blurred vision, and no areas of focal weakness or numbness. Normal gait. No sensory problems. Psychiatric No insomnia, depression, yuli or mood swings. No psychotropic drugs. Physical Exam: 
Constitutional Alert, cooperative, oriented. Mood and affect appropriate. Appears close to chronological age. Well nourished. Well developed. Head Normocephalic; no scars Eyes Conjunctivae and sclerae are clear and without icterus. Pupils are reactive and equal.  
ENMT Sinuses are nontender. No oral exudates, ulcers, masses, thrush or mucositis. Oropharynx clear. Tongue normal.  
Neck Supple without masses or thyromegaly. No jugular venous distension. Hematologic/Lymphatic No petechiae or purpura. No tender or palpable lymph nodes in the cervical, supraclavicular, axillary or inguinal area. Respiratory Lungs are clear to auscultation without rhonchi or wheezing. Cardiovascular Regular rate and rhythm of heart without murmurs, gallops or rubs. Chest / Line Site Chest is symmetric with no chest wall deformities. Abdomen Non-tender, non-distended, no masses, ascites or hepatosplenomegaly. Good bowel sounds. No guarding or rebound tenderness. No pulsatile masses. Musculoskeletal No tenderness or swelling, normal range of motion without obvious weakness. Extremities No visible deformities, no cyanosis, clubbing or edema. Skin No rashes, scars, or lesions suggestive of malignancy. No petechiae, purpura, or ecchymoses. No excoriations. Neurologic No sensory or motor deficits, normal cerebellar function, normal gait, cranial nerves intact. Psychiatric Alert and oriented times three. Coherent speech. Verbalizes understanding of our discussions today. Labs:  
ANC 0.3  Hbg 8.4 Impression and Plan: 
 
Admit for further evaluation and empiric start of IV antibiotics for neutropenic fever.

## 2018-10-23 NOTE — PROGRESS NOTES
Spiritual Care Assessment/Progress Note [address] 
 
 
NAME: Ana Maria Dowling      MRN: [id number] AGE: 43 y.o. SEX: female Jainism Affiliation: Yakelin Language: English  
 
10/23/2018     Total Time (in minutes): 4 Spiritual Assessment begun in [facility] GENERAL SURGERY through conversation with: 
  
    []Patient        [] Family    [] Friend(s) Reason for Consult: Advance medical directive consult Spiritual beliefs: (Please include comment if needed) 
   [] Identifies with a heather tradition:     
   [] Supported by a heather community:        
   [] Claims no spiritual orientation:       
   [] Seeking spiritual identity:            
   [] Adheres to an individual form of spirituality:       
   [] Not able to assess:                   
 
    
Identified resources for coping:  
   [x] Prayer                           
   [] Music                  [] Guided Imagery [x] Family/friends                 [] Pet visits [] Devotional reading                         [] Unknown 
   [] Other:                                         
 
 
Interventions offered during this visit: (See comments for more details) Patient Interventions: Advance medical directive consult, Affirmation of heather, Iconic (affirming the presence of God/Higher Power), Prayer (assurance of), Initial/Spiritual assessment, patient floor Family/Friend(s): Affirmation of heather, Prayer (assurance of), Iconic (affirming the presence of God/Higher Power) Plan of Care: 
 
 [] Support spiritual and/or cultural needs [x] Support AMD and/or advance care planning process    
 [] Support grieving process 
 [] Coordinate Rites and/or Rituals  
 [] Coordination with community clergy [] No spiritual needs identified at this time 
 [] Detailed Plan of Care below (See Comments)  [] Make referral to Music Therapy 
[] Make referral to Pet Therapy    
[] Make referral to Addiction services [] Make referral to [facility] 
[] Make referral to Spiritual Care Partner 
[] No future visits requested       
[x] Follow up visits as needed Comments:  Responded to In [employer] request for Advance Medical Directive (AMD) consult. Patient's  was in the room. They are familiar with the document. Mr. Ritika So stated he thinks they have one at home that has not been completed. Offered to leave an AMD for their review and advised them  can return to assist if Mrs. Villasenor would like to complete it during this admission. Visit ended when doctor came in. Patient and her  were receptive to assurance of prayer. JENELLE Saavedra, [facility],  [facility]  Paging Service  [phone])

## 2018-10-24 LAB
ALBUMIN SERPL-MCNC: 3 G/DL (ref 3.5–5)
ALBUMIN/GLOB SERPL: 1.1 {RATIO} (ref 1.1–2.2)
ALP SERPL-CCNC: 81 U/L (ref 45–117)
ALT SERPL-CCNC: 11 U/L (ref 12–78)
ANION GAP SERPL CALC-SCNC: 5 MMOL/L (ref 5–15)
AST SERPL-CCNC: 7 U/L (ref 15–37)
BASOPHILS # BLD: 0.1 K/UL (ref 0–0.1)
BASOPHILS NFR BLD: 2 % (ref 0–1)
BILIRUB SERPL-MCNC: 0.1 MG/DL (ref 0.2–1)
BUN SERPL-MCNC: 11 MG/DL (ref 6–20)
BUN/CREAT SERPL: 17 (ref 12–20)
CALCIUM SERPL-MCNC: 7.6 MG/DL (ref 8.5–10.1)
CHLORIDE SERPL-SCNC: 113 MMOL/L (ref 97–108)
CO2 SERPL-SCNC: 25 MMOL/L (ref 21–32)
CREAT SERPL-MCNC: 0.64 MG/DL (ref 0.55–1.02)
DIFFERENTIAL METHOD BLD: ABNORMAL
EOSINOPHIL # BLD: 0 K/UL (ref 0–0.4)
EOSINOPHIL NFR BLD: 0 % (ref 0–7)
ERYTHROCYTE [DISTWIDTH] IN BLOOD BY AUTOMATED COUNT: 13.8 % (ref 11.5–14.5)
GLOBULIN SER CALC-MCNC: 2.8 G/DL (ref 2–4)
GLUCOSE SERPL-MCNC: 100 MG/DL (ref 65–100)
HCT VFR BLD AUTO: 23.3 % (ref 35–47)
HGB BLD-MCNC: 7.6 G/DL (ref 11.5–16)
IMM GRANULOCYTES # BLD: 0 K/UL (ref 0–0.04)
IMM GRANULOCYTES NFR BLD AUTO: 0 % (ref 0–0.5)
LYMPHOCYTES # BLD: 0.7 K/UL (ref 0.8–3.5)
LYMPHOCYTES NFR BLD: 28 % (ref 12–49)
MCH RBC QN AUTO: 30.2 PG (ref 26–34)
MCHC RBC AUTO-ENTMCNC: 32.6 G/DL (ref 30–36.5)
MCV RBC AUTO: 92.5 FL (ref 80–99)
METAMYELOCYTES NFR BLD MANUAL: 2 %
MONOCYTES # BLD: 0.8 K/UL (ref 0–1)
MONOCYTES NFR BLD: 32 % (ref 5–13)
NEUTS BAND NFR BLD MANUAL: 4 %
NEUTS SEG # BLD: 0.9 K/UL (ref 1.8–8)
NEUTS SEG NFR BLD: 32 % (ref 32–75)
NRBC # BLD: 0.09 K/UL (ref 0–0.01)
NRBC BLD-RTO: 3.5 PER 100 WBC
PLATELET # BLD AUTO: 133 K/UL (ref 150–400)
PLATELET COMMENTS,PCOM: ABNORMAL
PMV BLD AUTO: 10.2 FL (ref 8.9–12.9)
POTASSIUM SERPL-SCNC: 3.9 MMOL/L (ref 3.5–5.1)
PROT SERPL-MCNC: 5.8 G/DL (ref 6.4–8.2)
RBC # BLD AUTO: 2.52 M/UL (ref 3.8–5.2)
RBC MORPH BLD: ABNORMAL
SODIUM SERPL-SCNC: 143 MMOL/L (ref 136–145)
TOTAL CELLS COUNTED SPEC: 50
WBC # BLD AUTO: 2.6 K/UL (ref 3.6–11)

## 2018-10-24 PROCEDURE — 36415 COLL VENOUS BLD VENIPUNCTURE: CPT | Performed by: INTERNAL MEDICINE

## 2018-10-24 PROCEDURE — 74011250637 HC RX REV CODE- 250/637: Performed by: INTERNAL MEDICINE

## 2018-10-24 PROCEDURE — 65270000029 HC RM PRIVATE

## 2018-10-24 PROCEDURE — 74011250636 HC RX REV CODE- 250/636: Performed by: INTERNAL MEDICINE

## 2018-10-24 PROCEDURE — 80053 COMPREHEN METABOLIC PANEL: CPT | Performed by: INTERNAL MEDICINE

## 2018-10-24 PROCEDURE — 85025 COMPLETE CBC W/AUTO DIFF WBC: CPT | Performed by: INTERNAL MEDICINE

## 2018-10-24 PROCEDURE — 74011000258 HC RX REV CODE- 258: Performed by: INTERNAL MEDICINE

## 2018-10-24 RX ADMIN — SODIUM CHLORIDE 75 ML/HR: 900 INJECTION, SOLUTION INTRAVENOUS at 08:07

## 2018-10-24 RX ADMIN — CEFEPIME HYDROCHLORIDE 2 G: 2 INJECTION, POWDER, FOR SOLUTION INTRAVENOUS at 22:29

## 2018-10-24 RX ADMIN — CEFEPIME HYDROCHLORIDE 2 G: 2 INJECTION, POWDER, FOR SOLUTION INTRAVENOUS at 12:41

## 2018-10-24 RX ADMIN — LEVOTHYROXINE SODIUM 150 MCG: 150 TABLET ORAL at 06:36

## 2018-10-24 RX ADMIN — CEFEPIME HYDROCHLORIDE 2 G: 2 INJECTION, POWDER, FOR SOLUTION INTRAVENOUS at 04:44

## 2018-10-24 NOTE — PROGRESS NOTES
General Surgery End of Shift Nursing Note Bedside shift change report given to [address] (oncoming nurse) by Bella Carnes (offgoing nurse). Report included the following information SBAR, Kardex, ED Summary, Intake/Output, MAR and Accordion. Shift worked:   7 am - 7pm   
Summary of shift:    Pt remained afebrile during shift. No complaints of pain or chills. Tolerating diet and IV hydration well. Issues for physician to address:   None Number times ambulated in hallway past shift: 1 Number of times OOB to chair past shift: 0 Pain Management: 
Current medication: none GI: 
 
Current diet:  DIET REGULAR Tolerating current diet: YES Passing flatus: YES Patient Safety: 
 
Falls Score: 0 Bed Alarm On? Not applicable Sitter? Not applicable Cyrus Bo

## 2018-10-24 NOTE — PROGRESS NOTES
Hematology Oncology Progress Note Follow up for: febrile neutropenia Chart notes reviewed since last visit. Case discussed with following:  and two sets of parents in room. Patient complains of the following: uncomfortable in suprapubic region with voiding this AM. Additional concerns noted by the staff:  
 
Patient Vitals for the past 24 hrs: 
 BP Temp Pulse Resp SpO2 Weight 10/24/18 0859      68 kg (150 lb) 10/24/18 0822 97/60 98.1 °F (36.7 °C) 71 16 100 %   
10/24/18 0300 106/58 98.6 °F (37 °C) 73 14 98 %   
10/23/18 2324 93/48 98.5 °F (36.9 °C) 70 16 97 %   
10/23/18 2001 103/52 99.1 °F (37.3 °C) 77 17 99 %   
10/23/18 1530 102/60 98.1 °F (36.7 °C) 70 16 99 %   
10/23/18 1144 96/53 99.1 °F (37.3 °C) 68 16 100 %  Review of Systems: 
Constitutional POAfevers, excessive fatigue Allergic/Immunologic No recent allergic reactions Eyes No significant visual difficulties. No diplopia. ENMT No problems with hearing, no sore throat, no sinus drainage. Endocrine No hot flashes or night sweats. No cold intolerance, polyuria, or polydipsia Hematologic/Lymphatic No easy bruising or bleeding. The patient denies any tender or palpable lymph nodes Breasts No abnormal masses of breast, nipple discharge or pain. Respiratory No dyspnea on exertion, orthopnea, chest pain, cough or hemoptysis. Cardiovascular No anginal chest pain, irregular heart beat, tachycardia, palpitations or orthopnea. Gastrointestinal No nausea, vomiting, diarrhea, constipation, cramping, dysphagia, reflux, heartburn, GI bleeding, or early satiety. No change in bowel habits. Genitourinary (M)  dysuria Musculoskeletal No joint pain, swelling or redness. No decreased range of motion. Integumentary No chronic rashes, inflammation, ulcerations, pruritus, petechiae, purpura, ecchymoses, or skin changes.   
Neurologic No headache, blurred vision, and no areas of focal weakness or numbness. Normal gait. No sensory problems. Psychiatric No insomnia, depression, yuli or mood swings. No psychotropic drugs. Physical Examination: 
Constitutional Alert, cooperative, oriented. Mood and affect appropriate. Appears close to chronological age. Well nourished. Well developed. Head Normocephalic; no scars Eyes Conjunctivae and sclerae are clear and without icterus. Pupils are round ENMT Sinuses are nontender. No oral exudates, ulcers, masses, thrush or mucositis. Oropharynx clear. Tongue normal.  
Neck Supple without masses or thyromegaly. No jugular venous distension. Hematologic/Lymphatic No petechiae or purpura. No tender or palpable lymph nodes noted Respiratory Lungs are clear to auscultation without rhonchi or wheezing. Cardiovascular Regular rate and rhythm of heart without murmurs, gallops or rubs. Chest / Line Site Chest is symmetric with no chest wall deformities. Abdomen Non-tender, non-distended, no masses, ascites or hepatosplenomegaly. Good bowel sounds. No guarding or rebound tenderness. No pulsatile masses. Musculoskeletal No tenderness or swelling, normal range of motion without obvious weakness. Extremities No visible deformities, no cyanosis, clubbing or edema. Skin No rashes, scars, or lesions suggestive of malignancy. No petechiae, purpura, or ecchymoses. No excoriations. Neurologic No sensory or motor deficits noted but not specifically tested. [facility] Psychiatric Alert and oriented times three. Coherent speech. Verbalizes understanding of our discussions today. Labs: 
Recent Results (from the past 24 hour(s)) CBC WITH AUTOMATED DIFF Collection Time: 10/23/18  1:56 PM  
Result Value Ref Range WBC 1.3 (L) 3.6 - 11.0 K/uL  
 RBC 2.99 (L) 3.80 - 5.20 M/uL HGB 8.9 (L) 11.5 - 16.0 g/dL HCT 27.5 (L) 35.0 - 47.0 % MCV 92.0 80.0 - 99.0 FL  
 MCH 29.8 26.0 - 34.0 PG  
 MCHC 32.4 30.0 - 36.5 g/dL  
 RDW 13.8 11.5 - 14.5 % PLATELET 759 477 - 668 K/uL MPV 9.9 8.9 - 12.9 FL  
 NRBC 2.3 (H) 0  WBC ABSOLUTE NRBC 0.03 (H) 0.00 - 0.01 K/uL NEUTROPHILS 20 (L) 32 - 75 % BAND NEUTROPHILS 2 % LYMPHOCYTES 35 12 - 49 % MONOCYTES 41 (H) 5 - 13 % EOSINOPHILS 0 0 - 7 % BASOPHILS 2 (H) 0 - 1 % IMMATURE GRANULOCYTES 0 0.0 - 0.5 % ABS. NEUTROPHILS 0.3 (L) 1.8 - 8.0 K/UL  
 ABS. LYMPHOCYTES 0.5 (L) 0.8 - 3.5 K/UL  
 ABS. MONOCYTES 0.5 0.0 - 1.0 K/UL  
 ABS. EOSINOPHILS 0.0 0.0 - 0.4 K/UL  
 ABS. BASOPHILS 0.0 0.0 - 0.1 K/UL  
 ABS. IMM. GRANS. 0.0 0.00 - 0.04 K/UL  
 DF MANUAL    
 RBC COMMENTS NORMOCYTIC, NORMOCHROMIC    
URINALYSIS W/MICROSCOPIC Collection Time: 10/23/18  1:56 PM  
Result Value Ref Range Color YELLOW/STRAW Appearance CLEAR CLEAR Specific gravity 1.011 1.003 - 1.030    
 pH (UA) 8.0 5.0 - 8.0 Protein NEGATIVE  NEG mg/dL Glucose NEGATIVE  NEG mg/dL Ketone NEGATIVE  NEG mg/dL Bilirubin NEGATIVE  NEG Blood LARGE (A) NEG Urobilinogen 0.2 0.2 - 1.0 EU/dL Nitrites NEGATIVE  NEG Leukocyte Esterase NEGATIVE  NEG    
 WBC 0-4 0 - 4 /hpf  
 RBC 5-10 0 - 5 /hpf Epithelial cells FEW FEW /lpf Bacteria NEGATIVE  NEG /hpf Hyaline cast 0-2 0 - 5 /lpf METABOLIC PANEL, COMPREHENSIVE Collection Time: 10/23/18  1:56 PM  
Result Value Ref Range Sodium 141 136 - 145 mmol/L Potassium 3.2 (L) 3.5 - 5.1 mmol/L Chloride 109 (H) 97 - 108 mmol/L  
 CO2 25 21 - 32 mmol/L Anion gap 7 5 - 15 mmol/L Glucose 84 65 - 100 mg/dL BUN 10 6 - 20 MG/DL Creatinine 0.75 0.55 - 1.02 MG/DL  
 BUN/Creatinine ratio 13 12 - 20 GFR est AA >60 >60 ml/min/1.73m2 GFR est non-AA >60 >60 ml/min/1.73m2 Calcium 8.4 (L) 8.5 - 10.1 MG/DL Bilirubin, total 0.3 0.2 - 1.0 MG/DL  
 ALT (SGPT) 14 12 - 78 U/L  
 AST (SGOT) 6 (L) 15 - 37 U/L Alk. phosphatase 106 45 - 117 U/L Protein, total 7.1 6.4 - 8.2 g/dL Albumin 3.7 3.5 - 5.0 g/dL Globulin 3.4 2.0 - 4.0 g/dL A-G Ratio 1.1 1.1 - 2.2 CULTURE, BLOOD, PAIRED Collection Time: 10/23/18  1:56 PM  
Result Value Ref Range Special Requests: NO SPECIAL REQUESTS Culture result: NO GROWTH AFTER 14 HOURS    
LACTIC ACID Collection Time: 10/23/18  1:56 PM  
Result Value Ref Range Lactic acid 2.0 0.4 - 2.0 MMOL/L  
CBC WITH AUTOMATED DIFF Collection Time: 10/24/18  4:50 AM  
Result Value Ref Range WBC 2.6 (L) 3.6 - 11.0 K/uL  
 RBC 2.52 (L) 3.80 - 5.20 M/uL HGB 7.6 (L) 11.5 - 16.0 g/dL HCT 23.3 (L) 35.0 - 47.0 % MCV 92.5 80.0 - 99.0 FL  
 MCH 30.2 26.0 - 34.0 PG  
 MCHC 32.6 30.0 - 36.5 g/dL  
 RDW 13.8 11.5 - 14.5 % PLATELET 784 (L) 645 - 400 K/uL MPV 10.2 8.9 - 12.9 FL  
 NRBC 3.5 (H) 0  WBC ABSOLUTE NRBC 0.09 (H) 0.00 - 0.01 K/uL NEUTROPHILS 32 32 - 75 % BAND NEUTROPHILS 4 % LYMPHOCYTES 28 12 - 49 % MONOCYTES 32 (H) 5 - 13 % EOSINOPHILS 0 0 - 7 % BASOPHILS 2 (H) 0 - 1 % METAMYELOCYTES 2 % IMMATURE GRANULOCYTES 0 0.0 - 0.5 % TOTAL CELLS COUNTED 50 ABS. NEUTROPHILS 0.9 (L) 1.8 - 8.0 K/UL  
 ABS. LYMPHOCYTES 0.7 (L) 0.8 - 3.5 K/UL  
 ABS. MONOCYTES 0.8 0.0 - 1.0 K/UL  
 ABS. EOSINOPHILS 0.0 0.0 - 0.4 K/UL  
 ABS. BASOPHILS 0.1 0.0 - 0.1 K/UL  
 ABS. IMM. GRANS. 0.0 0.00 - 0.04 K/UL  
 DF MANUAL PLATELET COMMENTS Giant platelets RBC COMMENTS NORMOCYTIC, NORMOCHROMIC METABOLIC PANEL, COMPREHENSIVE Collection Time: 10/24/18  4:50 AM  
Result Value Ref Range Sodium 143 136 - 145 mmol/L Potassium 3.9 3.5 - 5.1 mmol/L Chloride 113 (H) 97 - 108 mmol/L  
 CO2 25 21 - 32 mmol/L Anion gap 5 5 - 15 mmol/L Glucose 100 65 - 100 mg/dL BUN 11 6 - 20 MG/DL Creatinine 0.64 0.55 - 1.02 MG/DL  
 BUN/Creatinine ratio 17 12 - 20 GFR est AA >60 >60 ml/min/1.73m2 GFR est non-AA >60 >60 ml/min/1.73m2 Calcium 7.6 (L) 8.5 - 10.1 MG/DL  Bilirubin, total 0.1 (L) 0.2 - 1.0 MG/DL  
 ALT (SGPT) 11 (L) 12 - 78 U/L  
 AST (SGOT) 7 (L) 15 - 37 U/L Alk. phosphatase 81 45 - 117 U/L Protein, total 5.8 (L) 6.4 - 8.2 g/dL Albumin 3.0 (L) 3.5 - 5.0 g/dL Globulin 2.8 2.0 - 4.0 g/dL A-G Ratio 1.1 1.1 - 2.2 CXRay 10/23/18 negative Assessment and Plan:  
Neutropenic fever on admission - febrile neutropenia is generally defined as a single oral temperature > 38.3 C (101 F) or > 38 C for greater than one hour in a patient with an [address] < 500/mm3 or < 1000/mm3 with predicted decline to less than 500/mm3. For evaluation, one checks blood cultures, culture of any obvious skin lesions, urine culture, diarrheal stools (toxin assay), CXray, transaminases and creatinine. Thus far, her blood culture is no growth at 14 hours and U/A looked okay and culture is pending. Mo Kat was without acute infiltrate. Generally speaking, about 60% of these patients are actually infected. In 20% there is microbiological confirmation with bacteremia. In another 20%, there is microbiological confirmation but without bacteremia. In another 20%, an infected site can be clinically documented. In 20%, a non-infectious cause of the fever is identified and in 20%, the fever is due to an unknown cause. Neutropenic patients can have atypical inflammatory responses since they do not have granulocytes. Thus, folks may have pneumonias with impaired sputum production and unimpressive infiltrates on the CXray. With UTIs, there may be no pyuria and the laboratory needs to be encouraged to pursue the culture despite this. In the skin with cellulitis, erythemia is less pronounced and in the pharynx, there may be no exudate.  
   
Acceptable antimicrobials to use in febrile neutropenic patients include monotherapy with an agent that has antipseudomonal activity, such as an anti-pseudomonal cephalosporin (e.g. cefepime 2 g IV q 8 hrs), carbapenem (imipenem 1 g IV q 8 hr, meropenem 1 g IV q 8 hr, doripenem 500 mg IV q 8 hrs), or piperacillin/tazobactam (3.375 g IV q 6 hr). Quinolones and aminoglycosides are not acceptable for monotherapy. One can use combination therapy with an appropriate antipseudomonal therapy as noted earlier with the addition of a quinolone or aminoglycoside. In those with an increased likelihood of a gram positive cocci infection (for example, a cather-related infection, soft tissue infection, hypotension, severe mucositis, or prior recent quinolone prophylaxis), one can add vancomycin to either monotherapy or combination therapy with one of the regimens listed earlier. She was started on maxipime and thus far, no positive cultures and no further fevers. If she remains afebrile and stable with negative blood culture, will try to get home in AM tomorrow to complete a course of abx. I have encouraged her to get up and around.

## 2018-10-24 NOTE — PROGRESS NOTES
Reason for Admission: neutropenic fever RRAT Score:  13 Do you (patient/family) have any concerns for transition/discharge? Pt currently does not have any concerns at this time. Plan for utilizing home health: No home health at this time Likelihood of readmission? MODERATE Transition of Care Plan:       
 
CM completed room visit with pt, with family by bedside. Pt reported that she resides with spouse in their 2 story home (3 steps into main entrance). Pt reported that she is independent with ADLs, and drives. Pt reported that she is active with PCP: seen a year ago. Pt reported that she uses [facility] (360). Pt reported no DME, HH/SNF. Pt reported that she has had HH in the past. 
 
Pt denies needing additional services at d/c. Care Management Interventions PCP Verified by CM: Yes Mode of Transport at Discharge: Other (see comment) Transition of Care Consult (CM Consult): Discharge Planning Discharge Durable Medical Equipment: No 
Physical Therapy Consult: No 
Occupational Therapy Consult: No 
Speech Therapy Consult: No 
Current Support Network: Lives with Spouse, Own Home Confirm Follow Up Transport: Family Plan discussed with Pt/Family/Caregiver: Yes Discharge Location Discharge Placement: Home MOISES Bryan CM 
[phone]

## 2018-10-24 NOTE — PROGRESS NOTES
Handoff Report to [facility]. Uneventful shift overnight. Vitals stable, no complaints of pain.

## 2018-10-25 VITALS
SYSTOLIC BLOOD PRESSURE: 105 MMHG | WEIGHT: 150 LBS | HEART RATE: 66 BPM | RESPIRATION RATE: 16 BRPM | OXYGEN SATURATION: 100 % | DIASTOLIC BLOOD PRESSURE: 59 MMHG | BODY MASS INDEX: 22.81 KG/M2 | TEMPERATURE: 98.1 F

## 2018-10-25 LAB
ALBUMIN SERPL-MCNC: 3.1 G/DL (ref 3.5–5)
ALBUMIN/GLOB SERPL: 1 {RATIO} (ref 1.1–2.2)
ALP SERPL-CCNC: 85 U/L (ref 45–117)
ALT SERPL-CCNC: 14 U/L (ref 12–78)
ANION GAP SERPL CALC-SCNC: 4 MMOL/L (ref 5–15)
AST SERPL-CCNC: 11 U/L (ref 15–37)
BACTERIA SPEC CULT: NORMAL
BASOPHILS # BLD: 0.2 K/UL (ref 0–0.1)
BASOPHILS NFR BLD: 3 % (ref 0–1)
BILIRUB SERPL-MCNC: 0.1 MG/DL (ref 0.2–1)
BUN SERPL-MCNC: 9 MG/DL (ref 6–20)
BUN/CREAT SERPL: 10 (ref 12–20)
CALCIUM SERPL-MCNC: 8.1 MG/DL (ref 8.5–10.1)
CC UR VC: NORMAL
CHLORIDE SERPL-SCNC: 111 MMOL/L (ref 97–108)
CO2 SERPL-SCNC: 28 MMOL/L (ref 21–32)
CREAT SERPL-MCNC: 0.9 MG/DL (ref 0.55–1.02)
DIFFERENTIAL METHOD BLD: ABNORMAL
EOSINOPHIL # BLD: 0 K/UL (ref 0–0.4)
EOSINOPHIL NFR BLD: 0 % (ref 0–7)
ERYTHROCYTE [DISTWIDTH] IN BLOOD BY AUTOMATED COUNT: 14.1 % (ref 11.5–14.5)
GLOBULIN SER CALC-MCNC: 3 G/DL (ref 2–4)
GLUCOSE SERPL-MCNC: 89 MG/DL (ref 65–100)
HCT VFR BLD AUTO: 25.3 % (ref 35–47)
HGB BLD-MCNC: 8.2 G/DL (ref 11.5–16)
IMM GRANULOCYTES # BLD: 0 K/UL (ref 0–0.04)
IMM GRANULOCYTES NFR BLD AUTO: 0 % (ref 0–0.5)
LYMPHOCYTES # BLD: 1 K/UL (ref 0.8–3.5)
LYMPHOCYTES NFR BLD: 17 % (ref 12–49)
MCH RBC QN AUTO: 29.7 PG (ref 26–34)
MCHC RBC AUTO-ENTMCNC: 32.4 G/DL (ref 30–36.5)
MCV RBC AUTO: 91.7 FL (ref 80–99)
MONOCYTES # BLD: 1.4 K/UL (ref 0–1)
MONOCYTES NFR BLD: 25 % (ref 5–13)
MYELOCYTES NFR BLD MANUAL: 5 %
NEUTS BAND NFR BLD MANUAL: 7 %
NEUTS SEG # BLD: 2.8 K/UL (ref 1.8–8)
NEUTS SEG NFR BLD: 43 % (ref 32–75)
NRBC # BLD: 0.1 K/UL (ref 0–0.01)
NRBC BLD-RTO: 1.8 PER 100 WBC
PLATELET # BLD AUTO: 153 K/UL (ref 150–400)
PMV BLD AUTO: 10.2 FL (ref 8.9–12.9)
POTASSIUM SERPL-SCNC: 3.6 MMOL/L (ref 3.5–5.1)
PROT SERPL-MCNC: 6.1 G/DL (ref 6.4–8.2)
RBC # BLD AUTO: 2.76 M/UL (ref 3.8–5.2)
RBC MORPH BLD: ABNORMAL
SERVICE CMNT-IMP: NORMAL
SODIUM SERPL-SCNC: 143 MMOL/L (ref 136–145)
WBC # BLD AUTO: 5.6 K/UL (ref 3.6–11)

## 2018-10-25 PROCEDURE — 74011250636 HC RX REV CODE- 250/636: Performed by: INTERNAL MEDICINE

## 2018-10-25 PROCEDURE — 74011000258 HC RX REV CODE- 258: Performed by: INTERNAL MEDICINE

## 2018-10-25 PROCEDURE — 80053 COMPREHEN METABOLIC PANEL: CPT | Performed by: INTERNAL MEDICINE

## 2018-10-25 PROCEDURE — 74011250637 HC RX REV CODE- 250/637: Performed by: INTERNAL MEDICINE

## 2018-10-25 PROCEDURE — 85025 COMPLETE CBC W/AUTO DIFF WBC: CPT | Performed by: INTERNAL MEDICINE

## 2018-10-25 PROCEDURE — 36415 COLL VENOUS BLD VENIPUNCTURE: CPT | Performed by: INTERNAL MEDICINE

## 2018-10-25 RX ORDER — LEVOFLOXACIN 500 MG/1
500 TABLET, FILM COATED ORAL DAILY
Qty: 3 TAB | Refills: 0 | Status: SHIPPED
Start: 2018-10-25 | End: 2018-10-28

## 2018-10-25 RX ORDER — HEPARIN 100 UNIT/ML
300 SYRINGE INTRAVENOUS
Status: DISCONTINUED | OUTPATIENT
Start: 2018-10-25 | End: 2018-10-25 | Stop reason: HOSPADM

## 2018-10-25 RX ORDER — HEPARIN 100 UNIT/ML
SYRINGE INTRAVENOUS
Status: DISCONTINUED
Start: 2018-10-25 | End: 2018-10-25 | Stop reason: HOSPADM

## 2018-10-25 RX ADMIN — CEFEPIME HYDROCHLORIDE 2 G: 2 INJECTION, POWDER, FOR SOLUTION INTRAVENOUS at 04:23

## 2018-10-25 RX ADMIN — SODIUM CHLORIDE 75 ML/HR: 900 INJECTION, SOLUTION INTRAVENOUS at 04:23

## 2018-10-25 RX ADMIN — LEVOTHYROXINE SODIUM 150 MCG: 150 TABLET ORAL at 06:50

## 2018-10-25 NOTE — PROGRESS NOTES
PCP JAREN appt scheduled with Dr. Ben Grant on 11/2/2018 at 11:00am Appt added to AVS. Kuldip Saleem CM Specialist

## 2018-10-25 NOTE — PROGRESS NOTES
Discharge instructions reviewed with patient. Patient is awaiting her Port cath to be de cannulated as this nurse is not certified to perform that action .

## 2018-10-25 NOTE — ROUTINE PROCESS
PATIENT DISCHARGE INSTRUCTIONS PATIENT DISCHARGE INSTRUCTIONS Luz Sharma / [id number] : 1975 Admitted 10/23/2018 Discharged: 10/25/2018 · It is important that you take the medication exactly as they are prescribed. · Keep your medication in the bottles provided by the pharmacist and keep a list of the medication names, dosages, and times to be taken in your wallet. · Do not take other medications without consulting your doctor. What to do at [facility] Recommended Diet: Regular Diet Recommended Activity: Activity as tolerated If you experience any of the following symptoms fevers please follow up with MD 
. Signed By: Christy Slade RN 2018

## 2018-10-25 NOTE — PROGRESS NOTES
Bedside and Verbal shift change report given to [address] (oncoming nurse) by Natali Connell (offgoing nurse). Report included the following information SBAR, Kardex, MAR and Recent Results. Zone Phone:    
 
 
Significant changes during shift:  none Patient Information Veronica Joyce 37 y.o. 
10/23/2018 11:10 AM by Verito Hatfield MD. Veronica Joyce was admitted from Home 
 
Problem List 
 
Patient Active Problem List  
 Diagnosis Date Noted  Neutropenic fever ([facility] 10/23/2018  Breast cancer metastasized to axillary lymph node, left ([address] 2018  Malignant neoplasm of lower-outer quadrant of left breast of female, estrogen receptor positive ([facility] [address] 2018  Hypothyroidism 2012 Past Medical History:  
Diagnosis Date  Acquired hypothyroidism   
 on levothyroxine  Anemia NEC   
 slow fe  Cancer ([address] LEFT BREAST  GERD (gastroesophageal reflux disease)  Hashimoto thyroiditis, fibrous variant  HX OTHER MEDICAL   
  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 
CHF:No No 
PNA:No No 
 
Activity Status: OOB to Chair Yes Ambulated this shift Yes Bed Rest No 
 
Supplemental O2: (If Applicable) N/A 
 
 
LINES AND DRAINS: 
 
Mediport R chest 
 
Wounds: (If Applicable) Wounds- N/A Patient Safety: 
 
Falls Score Total Score: 0 Safety Level_______ Bed Alarm On? No 
Sitter? No 
 
Plan for upcoming shift: possible d/c Discharge Plan: TBD Active Consults: 
None

## 2018-10-25 NOTE — DISCHARGE INSTRUCTIONS
PATIENT DISCHARGE INSTRUCTIONS      PATIENT DISCHARGE INSTRUCTIONS    Mackenzie Euceda / [id number] : 1975    Admitted 10/23/2018 Discharged: 10/25/2018       · It is important that you take the medication exactly as they are prescribed. · Keep your medication in the bottles provided by the pharmacist and keep a list of the medication names, dosages, and times to be taken in your wallet. · Do not take other medications without consulting your doctor. What to do at Home    Recommended Diet: Regular Diet    Recommended Activity: Activity as tolerated    If you experience anything that worries you, please follow up with Dr. Stu Leal or one of her staff.         Signed By: Ibeth Thomas MD     2018

## 2018-10-25 NOTE — PROGRESS NOTES
Hematology Oncology Progress Note Follow up for: febrile neutropenia Chart notes reviewed since last visit. Case discussed with following:  . Patient complains of the following: no new issues noted. Quiet night Additional concerns noted by the staff:  
 
Patient Vitals for the past 24 hrs: 
 BP Temp Pulse Resp SpO2 Weight 10/25/18 0813 105/59 98.1 °F (36.7 °C) 66 16 100 %   
10/25/18 0406 92/51 98.1 °F (36.7 °C) 63 16 97 %   
10/25/18 0015 102/58 98.2 °F (36.8 °C) 66 18 99 %   
10/24/18 1945 109/61 97.8 °F (36.6 °C) 68 18 99 %   
10/24/18 1550 102/59 97.9 °F (36.6 °C) 67 16 100 %   
10/24/18 1244 114/68 98.1 °F (36.7 °C) 65 16 100 %   
10/24/18 0859      68 kg (150 lb) Review of Systems: 
Constitutional POAfevers, excessive fatigue Allergic/Immunologic No recent allergic reactions Eyes No significant visual difficulties. No diplopia. ENMT No problems with hearing, no sore throat, no sinus drainage. Endocrine No hot flashes or night sweats. No cold intolerance, polyuria, or polydipsia Hematologic/Lymphatic No easy bruising or bleeding. The patient denies any tender or palpable lymph nodes Breasts No abnormal masses of breast, nipple discharge or pain. Respiratory No dyspnea on exertion, orthopnea, chest pain, cough or hemoptysis. Cardiovascular No anginal chest pain, irregular heart beat, tachycardia, palpitations or orthopnea. Gastrointestinal No nausea, vomiting, diarrhea, constipation, cramping, dysphagia, reflux, heartburn, GI bleeding, or early satiety. No change in bowel habits. Genitourinary (M)  dysuria Musculoskeletal No joint pain, swelling or redness. No decreased range of motion. Integumentary No chronic rashes, inflammation, ulcerations, pruritus, petechiae, purpura, ecchymoses, or skin changes. Neurologic No headache, blurred vision, and no areas of focal weakness or numbness. Normal gait. No sensory problems. Psychiatric No insomnia, depression, yuli or mood swings. No psychotropic drugs. Physical Examination: 
Constitutional Alert, cooperative, oriented. Mood and affect appropriate. Appears close to chronological age. Well nourished. Well developed. Head Normocephalic; no scars Eyes Conjunctivae and sclerae are clear and without icterus. Pupils are round ENMT Sinuses are nontender. No oral exudates, ulcers, masses, thrush or mucositis. Oropharynx clear. Tongue normal.  
Neck Supple without masses or thyromegaly. No jugular venous distension. Hematologic/Lymphatic No petechiae or purpura. No tender or palpable lymph nodes noted Respiratory Lungs are clear to auscultation without rhonchi or wheezing. Cardiovascular Regular rate and rhythm of heart without murmurs, gallops or rubs. Chest / Line Site Chest is symmetric with no chest wall deformities. Abdomen Non-tender, non-distended, no masses, ascites or hepatosplenomegaly. Good bowel sounds. No guarding or rebound tenderness. No pulsatile masses. Musculoskeletal No tenderness or swelling, normal range of motion without obvious weakness. Extremities No visible deformities, no cyanosis, clubbing or edema. Skin No rashes, scars, or lesions suggestive of malignancy. No petechiae, purpura, or ecchymoses. No excoriations. Neurologic No sensory or motor deficits noted but not specifically tested. [facility] Psychiatric Alert and oriented times three. Coherent speech. Verbalizes understanding of our discussions today. Labs: 
Recent Results (from the past 24 hour(s)) CBC WITH AUTOMATED DIFF Collection Time: 10/25/18  4:21 AM  
Result Value Ref Range WBC 5.6 3.6 - 11.0 K/uL  
 RBC 2.76 (L) 3.80 - 5.20 M/uL HGB 8.2 (L) 11.5 - 16.0 g/dL HCT 25.3 (L) 35.0 - 47.0 % MCV 91.7 80.0 - 99.0 FL  
 MCH 29.7 26.0 - 34.0 PG  
 MCHC 32.4 30.0 - 36.5 g/dL  
 RDW 14.1 11.5 - 14.5 % PLATELET 588 196 - 338 K/uL  MPV 10.2 8.9 - 12.9 FL  
 NRBC 1.8 (H) 0  WBC ABSOLUTE NRBC 0.10 (H) 0.00 - 0.01 K/uL NEUTROPHILS 43 32 - 75 % BAND NEUTROPHILS 7 % LYMPHOCYTES 17 12 - 49 % MONOCYTES 25 (H) 5 - 13 % EOSINOPHILS 0 0 - 7 % BASOPHILS 3 (H) 0 - 1 % MYELOCYTES 5 % IMMATURE GRANULOCYTES 0 0.0 - 0.5 % ABS. NEUTROPHILS 2.8 1.8 - 8.0 K/UL  
 ABS. LYMPHOCYTES 1.0 0.8 - 3.5 K/UL  
 ABS. MONOCYTES 1.4 (H) 0.0 - 1.0 K/UL  
 ABS. EOSINOPHILS 0.0 0.0 - 0.4 K/UL  
 ABS. BASOPHILS 0.2 (H) 0.0 - 0.1 K/UL  
 ABS. IMM. GRANS. 0.0 0.00 - 0.04 K/UL  
 DF MANUAL    
 RBC COMMENTS NORMOCYTIC, NORMOCHROMIC METABOLIC PANEL, COMPREHENSIVE Collection Time: 10/25/18  4:21 AM  
Result Value Ref Range Sodium 143 136 - 145 mmol/L Potassium 3.6 3.5 - 5.1 mmol/L Chloride 111 (H) 97 - 108 mmol/L  
 CO2 28 21 - 32 mmol/L Anion gap 4 (L) 5 - 15 mmol/L Glucose 89 65 - 100 mg/dL BUN 9 6 - 20 MG/DL Creatinine 0.90 0.55 - 1.02 MG/DL  
 BUN/Creatinine ratio 10 (L) 12 - 20 GFR est AA >60 >60 ml/min/1.73m2 GFR est non-AA >60 >60 ml/min/1.73m2 Calcium 8.1 (L) 8.5 - 10.1 MG/DL Bilirubin, total 0.1 (L) 0.2 - 1.0 MG/DL  
 ALT (SGPT) 14 12 - 78 U/L  
 AST (SGOT) 11 (L) 15 - 37 U/L Alk. phosphatase 85 45 - 117 U/L Protein, total 6.1 (L) 6.4 - 8.2 g/dL Albumin 3.1 (L) 3.5 - 5.0 g/dL Globulin 3.0 2.0 - 4.0 g/dL A-G Ratio 1.0 (L) 1.1 - 2.2 CXRay 10/23/18 negative Assessment and Plan:  
Neutropenia resolved. No further fevers. Cultures mostly negative - 70k organisms in urine. Will send home today. Has script for levaquin at home called in from office. followup in office in 1-2 weeks.

## 2018-10-25 NOTE — DISCHARGE SUMMARY
Oncology Discharge Summary    Discharge Diagnoses: neutropenic fever  Neutropenic fever ([facility]     Problem List:  Active Problems:    Ne[facility]rop[facility] ([facility] (10/23/2018)         Hospital Course: Pete Orosco is a 37 y.o. admitted on 10/23/2018 for neutropenic fever  Neutropenic fever ([facility] Pete Orosco was admitted from the office with a fever of 101.7 experienced at home. She was noted in the office to have a neutrophil count of 300. She was started on maxipime for empiric coverage after drawing blood cultures and sending a urine culture. The urine culture had 70,000 mixed urogenital organisms. The blood culture had no growth at 2 days. Her white count had risen to 5600 on the day of dischrage with 2800 neutrophils. She had no further fevers and felt well on the day of her discharge which was her birthday! Imaging:    Chest X-Ray: no infiltrate      Labs:    CBC: 10/23/2018: HCT 27.5 %* (Ref range: 35.0 - 47.0 %); HGB 8.9 g/dL* (Ref range: 11.5 - 16.0 g/dL); PLATELET 557 K/uL (Ref range: 150 - 400 K/uL); RBC 2.99 M/uL* (Ref range: 3.80 - 5.20 M/uL); WBC 1.3 K/uL* (Ref range: 3.6 - 11.0 K/uL)  10/24/2018: HCT 23.3 %* (Ref range: 35.0 - 47.0 %); HGB 7.6 g/dL* (Ref range: 11.5 - 16.0 g/dL); PLATELET 463 K/uL* (Ref range: 150 - 400 K/uL); RBC 2.52 M/uL* (Ref range: 3.80 - 5.20 M/uL); WBC 2.6 K/uL* (Ref range: 3.6 - 11.0 K/uL)   BMP: 10/23/2018: BUN 10 MG/DL (Ref range: 6 - 20 MG/DL); Calcium 8.4 MG/DL* (Ref range: 8.5 - 10.1 MG/DL); Chloride 109 mmol/L* (Ref range: 97 - 108 mmol/L); CO2 25 mmol/L (Ref range: 21 - 32 mmol/L); Creatinine 0.75 MG/DL (Ref range: 0.55 - 1.02 MG/DL); Glucose 84 mg/dL (Ref range: 65 - 100 mg/dL); Potassium 3.2 mmol/L* (Ref range: 3.5 - 5.1 mmol/L); Sodium 141 mmol/L (Ref range: 136 - 145 mmol/L)  10/24/2018: BUN 11 MG/DL (Ref range: 6 - 20 MG/DL); Calcium 7.6 MG/DL* (Ref range: 8.5 - 10.1 MG/DL);  Chloride 113 mmol/L* (Ref range: 97 - 108 mmol/L); CO2 25 mmol/L (Ref range: 21 - 32 mmol/L); Creatinine 0.64 MG/DL (Ref range: 0.55 - 1.02 MG/DL); Glucose 100 mg/dL (Ref range: 65 - 100 mg/dL); Potassium 3.9 mmol/L (Ref range: 3.5 - 5.1 mmol/L); Sodium 143 mmol/L (Ref range: 136 - 145 mmol/L)  Liver: 10/23/2018: Albumin 3.7 g/dL (Ref range: 3.5 - 5.0 g/dL); Alk. phosphatase 106 U/L (Ref range: 45 - 117 U/L); AST (SGOT) 6 U/L* (Ref range: 15 - 37 U/L); Protein, total 7.1 g/dL (Ref range: 6.4 - 8.2 g/dL)  10/24/2018: Albumin 3.0 g/dL* (Ref range: 3.5 - 5.0 g/dL); Alk. phosphatase 81 U/L (Ref range: 45 - 117 U/L); AST (SGOT) 7 U/L* (Ref range: 15 - 37 U/L); Protein, total 5.8 g/dL* (Ref range: 6.4 - 8.2 g/dL)  Recent Results (from the past 24 hour(s))   CBC WITH AUTOMATED DIFF    Collection Time: 10/25/18  4:21 AM   Result Value Ref Range    WBC 5.6 3.6 - 11.0 K/uL    RBC 2.76 (L) 3.80 - 5.20 M/uL    HGB 8.2 (L) 11.5 - 16.0 g/dL    HCT 25.3 (L) 35.0 - 47.0 %    MCV 91.7 80.0 - 99.0 FL    MCH 29.7 26.0 - 34.0 PG    MCHC 32.4 30.0 - 36.5 g/dL    RDW 14.1 11.5 - 14.5 %    PLATELET 064 217 - 421 K/uL    MPV 10.2 8.9 - 12.9 FL    NRBC 1.8 (H) 0  WBC    ABSOLUTE NRBC 0.10 (H) 0.00 - 0.01 K/uL    NEUTROPHILS 43 32 - 75 %    BAND NEUTROPHILS 7 %    LYMPHOCYTES 17 12 - 49 %    MONOCYTES 25 (H) 5 - 13 %    EOSINOPHILS 0 0 - 7 %    BASOPHILS 3 (H) 0 - 1 %    MYELOCYTES 5 %    IMMATURE GRANULOCYTES 0 0.0 - 0.5 %    ABS. NEUTROPHILS 2.8 1.8 - 8.0 K/UL    ABS. LYMPHOCYTES 1.0 0.8 - 3.5 K/UL    ABS. MONOCYTES 1.4 (H) 0.0 - 1.0 K/UL    ABS. EOSINOPHILS 0.0 0.0 - 0.4 K/UL    ABS. BASOPHILS 0.2 (H) 0.0 - 0.1 K/UL    ABS. IMM.  GRANS. 0.0 0.00 - 0.04 K/UL    DF MANUAL      RBC COMMENTS NORMOCYTIC, NORMOCHROMIC     METABOLIC PANEL, COMPREHENSIVE    Collection Time: 10/25/18  4:21 AM   Result Value Ref Range    Sodium 143 136 - 145 mmol/L    Potassium 3.6 3.5 - 5.1 mmol/L    Chloride 111 (H) 97 - 108 mmol/L    CO2 28 21 - 32 mmol/L    Anion gap 4 (L) 5 - 15 mmol/L    Glucose 89 65 - 100 mg/dL    BUN 9 6 - 20 MG/DL Creatinine 0.90 0.55 - 1.02 MG/DL    BUN/Creatinine ratio 10 (L) 12 - 20      GFR est AA >60 >60 ml/min/1.73m2    GFR est non-AA >60 >60 ml/min/1.73m2    Calcium 8.1 (L) 8.5 - 10.1 MG/DL    Bilirubin, total 0.1 (L) 0.2 - 1.0 MG/DL    ALT (SGPT) 14 12 - 78 U/L    AST (SGOT) 11 (L) 15 - 37 U/L    Alk. phosphatase 85 45 - 117 U/L    Protein, total 6.1 (L) 6.4 - 8.2 g/dL    Albumin 3.1 (L) 3.5 - 5.0 g/dL    Globulin 3.0 2.0 - 4.0 g/dL    A-G Ratio 1.0 (L) 1.1 - 2.2         Discharge Medications:  Current Discharge Medication List      CONTINUE these medications which have CHANGED    Details   levoFLOXacin (LEVAQUIN) 500 mg tablet Take 1 Tab by mouth daily for 3 days. Qty: 3 Tab, Refills: 0         CONTINUE these medications which have NOT CHANGED    Details   SYNTHROID 150 mcg tablet       prochlorperazine (COMPAZINE) 10 mg tablet       Lactobacillus acidophilus (PROBIOTIC PO) Take  by mouth. ondansetron (ZOFRAN ODT) 8 mg disintegrating tablet               Discharge Exam:  Visit Vitals  /59 (BP 1 Location: Right arm, BP Patient Position: At rest)   Pulse 66   Temp 98.1 °F (36.7 °C)   Resp 16   Wt 68 kg (150 lb)   SpO2 100%   Breastfeeding? No   BMI 22.81 kg/m²     Constitutional Alert, cooperative, oriented. Mood and affect appropriate. Appears close to chronological age. Well nourished. Well developed. Head Normocephalic; no scars   Eyes Conjunctivae and sclerae are clear and without icterus. Pupils are round   ENMT Sinuses are nontender. No oral exudates, ulcers, masses, thrush or mucositis. Oropharynx clear. Tongue normal.   Neck Supple without masses or thyromegaly. No jugular venous distension. Hematologic/Lymphatic No petechiae or purpura. No tender or palpable lymph nodes noted   Respiratory Lungs are clear to auscultation without rhonchi or wheezing. Cardiovascular Regular rate and rhythm of heart without murmurs, gallops or rubs.    Chest / Line Site Chest is symmetric with no chest wall deformities. Abdomen Non-tender, non-distended, no masses, ascites or hepatosplenomegaly. Good bowel sounds. No guarding or rebound tenderness. No pulsatile masses. Musculoskeletal No tenderness or swelling, normal range of motion without obvious weakness. Extremities No visible deformities, no cyanosis, clubbing or edema. Skin No rashes, scars, or lesions suggestive of malignancy. No petechiae, purpura, or ecchymoses. No excoriations. Neurologic No sensory or motor deficits noted but not specifically tested. [facility] Cancer Psychiatric Alert and oriented times three. Coherent speech. Verbalizes understanding of our discussions today.          Disposition: home    Patient Instructions: Activity: Activity as tolerated  Diet: Regular Diet  Wound Care: None needed    Follow-up with Dr. Nupur Luna or associate in 1 -2 weeks.   Follow-up tests/labs CBC    Signed:  Jerrell Pichardo MD  10/25/2018  8:56 AM

## 2018-10-25 NOTE — PROGRESS NOTES
Pt will be d/c on today and return home with family. Pt aware that her nurse will review d/c needs and plans. Pt will have transport home on today. MILANA has completed the needs of the pt at this time. MOISES Seaman CM 
[phone]

## 2018-10-29 LAB
BACTERIA SPEC CULT: NORMAL
SERVICE CMNT-IMP: NORMAL

## 2019-02-08 ENCOUNTER — OFFICE VISIT (OUTPATIENT)
Dept: SURGERY | Age: 44
End: 2019-02-08

## 2019-02-08 VITALS
WEIGHT: 159 LBS | HEART RATE: 78 BPM | DIASTOLIC BLOOD PRESSURE: 70 MMHG | SYSTOLIC BLOOD PRESSURE: 104 MMHG | BODY MASS INDEX: 24.1 KG/M2 | HEIGHT: 68 IN

## 2019-02-08 DIAGNOSIS — Z85.3 HISTORY OF LEFT BREAST CANCER: Primary | ICD-10-CM

## 2019-02-08 DIAGNOSIS — Z12.39 BREAST SCREENING: ICD-10-CM

## 2019-02-08 DIAGNOSIS — N60.01 BENIGN BREAST CYST IN FEMALE, RIGHT: ICD-10-CM

## 2019-02-08 RX ORDER — NICOTINE POLACRILEX 2 MG
GUM BUCCAL
COMMUNITY
End: 2019-05-29 | Stop reason: ALTCHOICE

## 2019-02-08 RX ORDER — ERGOCALCIFEROL 1.25 MG/1
CAPSULE ORAL
COMMUNITY
End: 2019-05-29 | Stop reason: ALTCHOICE

## 2019-02-08 NOTE — PATIENT INSTRUCTIONS

## 2019-02-08 NOTE — PROGRESS NOTES
HISTORY OF PRESENT ILLNESS  Marv Andre is a 37 y.o. female. [facility] ESTABLISHED patient here for RIGHT breast lump. No pain. Discontinued chemotherapy early due to ear problems. She saw an ENT. Meets with Dr. Maynor Mitchell on Monday to discuss radiation. Breast cancer history-   7/31/18 - nipple delay and ALND, +4/20 LN, Dr. Karen Aj and Dr. Ishan Soto. 8/14/18 - LEFT breast skin and nipple sparing mastectomy with reconstruction, port insertion, RIGHT breast augmentation. Tumor 3.0 cm. 42 yo female with left breast T2 N2 ILC and LCIS grade 2 Er/Pr + Her 2 pal negative. mammaprint high risk. Margins clear. 12/2018 - completed adjuvant chemotherapy AC x 4 and 6 weeks of Taxol - Dr. Manuelito Paige (discontinued Taxol early due to ear problems). Dr. Maynor Mitchell is her radiation oncologist.     Family history-  No family history of breast or ovarian cancer. Her mother had non-hodgkin's lymphoma. 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     Breast imaging-  6/25/18 Breast MRI. Screening mammogram was performed 11/17/17: BI-RADS 1. Review of Systems   All other systems reviewed and are negative. Physical Exam   Pulmonary/Chest: Right breast exhibits mass (1 cm soft mass 9:00 2 cfn). Right breast exhibits no inverted nipple, no nipple discharge, no skin change and no tenderness. Left breast exhibits no inverted nipple, no mass, no nipple discharge, no skin change and no tenderness. Breasts are symmetrical.       Bilateral implants intact  Us on right shows breast cyst at 9:00        Lymphadenopathy:     She has no cervical adenopathy. She has no axillary adenopathy. Nursing note and vitals reviewed. ASSESSMENT and PLAN    ICD-10-CM ICD-9-CM    1. History of left breast cancer Z85.3 V10.3 MARTY 3D MARIA R W MAMMO RT DX INCL CAD      US WHOLE BREAST SCREENING COMP RT   2. Breast screening Z12.31 V76.10 MARTY 3D MARIA R W MAMMO RT DX INCL CAD      US WHOLE BREAST SCREENING COMP RT   3.  Benign breast cyst in female, right N60.01 610.0 - 46 yo female with h/o left breast cancer  Due to start xrt. Has breast cysts on right, also seen on prior mri  Right breast due 3D mammo and abus  Will schedule mri due after June.    Overall doing well   leave port in until after xrt  - f/u in 6 months

## 2019-02-13 ENCOUNTER — HOSPITAL ENCOUNTER (OUTPATIENT)
Dept: MAMMOGRAPHY | Age: 44
Discharge: HOME | End: 2019-02-13
Attending: SURGERY
Payer: COMMERCIAL

## 2019-02-13 DIAGNOSIS — Z12.39 BREAST SCREENING: ICD-10-CM

## 2019-02-13 DIAGNOSIS — Z85.3 HISTORY OF LEFT BREAST CANCER: ICD-10-CM

## 2019-02-13 PROCEDURE — 76642 ULTRASOUND BREAST LIMITED: CPT

## 2019-02-13 PROCEDURE — 77061 BREAST TOMOSYNTHESIS UNI: CPT

## 2019-02-14 ENCOUNTER — TELEPHONE (OUTPATIENT)
Dept: SURGERY | Age: 44
End: 2019-02-14

## 2019-02-14 NOTE — TELEPHONE ENCOUNTER
The patient called and left a message that she had her mammogram and ultrasound were done yesterday and \"they found something else in the RIGHT breast.\" After talking with her  and praying, she would like to go forward with a RIGHT mastectomy, after she is done with XRT. She wishes to not have any more \"drama\" with the breast and is at peace with her decision. The ultrasound was BI RADS 3 suggesting 6 month follow up for possible cysts. The patient did not request a phone call back. She just wanted to make sure Dr. Digna Raya was made aware.

## 2019-02-14 NOTE — TELEPHONE ENCOUNTER
I called and left a message with the patient that Dr. Digna Raya would like to see the patient after her radiation. I asked the patient to call [phone] at her earliest convenience to make the appointment.

## 2019-05-23 ENCOUNTER — TELEPHONE (OUTPATIENT)
Dept: SURGERY | Age: 44
End: 2019-05-23

## 2019-05-23 NOTE — TELEPHONE ENCOUNTER
I called the patient per Dr. Ganesh Mccormick to have her come in for an office visit post radiation and prior to surgery. The patient was agreeable and Trent Villalobos will call her with an appointment. She was appreciative for the follow up call.

## 2019-05-29 ENCOUNTER — OFFICE VISIT (OUTPATIENT)
Dept: SURGERY | Age: 44
End: 2019-05-29

## 2019-05-29 VITALS
SYSTOLIC BLOOD PRESSURE: 108 MMHG | HEIGHT: 68 IN | DIASTOLIC BLOOD PRESSURE: 73 MMHG | HEART RATE: 64 BPM | BODY MASS INDEX: 24.1 KG/M2 | WEIGHT: 159 LBS

## 2019-05-29 DIAGNOSIS — Z85.3 HISTORY OF LEFT BREAST CANCER: Primary | ICD-10-CM

## 2019-05-29 DIAGNOSIS — Z91.89 AT HIGH RISK FOR BREAST CANCER: ICD-10-CM

## 2019-05-29 DIAGNOSIS — C77.3 BREAST CANCER METASTASIZED TO AXILLARY LYMPH NODE, LEFT (HCC): ICD-10-CM

## 2019-05-29 DIAGNOSIS — C50.912 BREAST CANCER METASTASIZED TO AXILLARY LYMPH NODE, LEFT (HCC): ICD-10-CM

## 2019-05-29 RX ORDER — TAMOXIFEN CITRATE 20 MG/1
20 TABLET ORAL
COMMUNITY
Start: 2019-05-16

## 2019-05-29 NOTE — PROGRESS NOTES
HISTORY OF PRESENT ILLNESS  Alejandro Hyde is a 37 y.o. female. She goes by the name \"tristan\"  [facility] ESTABLISHED patient here for follow up LEFT breast cancer, s/p radiation therapy. Completed radiation 2 weeks ago. Experiencing skin peeling and using Myoderm four times a day. Started tamoxifen last week. The patient desires a mastectomy and reconstruction of the RIGHT breast. Dr. Marlene Monroe is her plastic surgeon. Breast cancer history-   7/31/18 - nipple delay and ALND, +4/20 LN, Dr. Ivonne Martinez and Dr. Marlene Monroe. 8/14/18 - LEFT breast skin and nipple sparing mastectomy with reconstruction, port insertion, RIGHT breast augmentation.   Tumor 3.0 cm. 41 yo female with left breast T2 N2 ILC and LCIS grade 2 Er/Pr + Her 2 pal negative. mammaprint high risk. Margins clear.   12/2018 - completed adjuvant chemotherapy AC x 4 and 6 weeks of Taxol - Dr. Angela Cardoza (discontinued Taxol early due to ear problems). 5/15/19 - completed radiation therapy - Dr. Compa Bobo     Family history-  No family history of breast or ovarian cancer. Her mother had non-hodgkin's lymphoma. 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     Breast imaging-  6/25/18 Breast MRI. RIGHT breast dx rekha and US 2/2019. FINDINGS:  There is a palpable abnormality marker on the lateral aspect of the right  breast, deep 3rd. There is a circumscribed and partially obscured focal  asymmetry associated with this marker. There is a palpable abnormality marker on the 12 o'clock, periareolar right  breast. There is no visible underlying mammographic abnormality. No visualized mass, suspicious microcalcification or architectural distortion. .  Targeted right breast ultrasound:   Simple cyst in the lateral aspect of the right breast at 9-10 o'clock which  measures 1 cm in maximum dimension.    There are 2 hypoechoic lesions in continuity with one another in the anterior  3rd at 11-12 o'clock which measure approximately 0.5 cm in maximum dimension. .  IMPRESSION  IMPRESSION:  1. Simple cyst in the lateral aspect of the right breast.  BI-RADS Category 2 - Benign findings. 2. Likely minimally complex cyst in the anterior 3rd of the left breast at 11-12  o'clock. BI-RADS Category 3 - Probably benign findings. .       Review of Systems   All other systems reviewed and are negative. Physical Exam   Pulmonary/Chest:       Left reconstructed breast implant contracted  Skin darkening from xrt  Right breast no masses or adenopathy  Implant intact on right. Good rom left arm  No lymphedema   Nursing note and vitals reviewed. ASSESSMENT and PLAN    ICD-10-CM ICD-9-CM    1. History of left breast cancer Z85.3 V10.3 MRI BREAST BI W WO CONT   2. At high risk for breast cancer Z91.89 V49.89 MRI BREAST BI W WO CONT   3. Breast cancer metastasized to axillary lymph node, left (HCC) C50.912 174.9     C77.3 196.3      36 yo female with h/o Stage 3 left breast   ILC. Er/Pr+ her 2 pal negative. Had high risk mammaprint received adjuvant chemo  Had adjuvant xrt for mary disease  Would like prophy mastectomy on right  Had birad 3 mammo for cysts  Since it has been one year since mri will check prior to mastectomy  She would like Dr. Madeline Dean to do reconstruction but only does this at [facility] at which I do not have privileges. Will have her meet with Dr. Anjum Wu to discuss mastectomy on right. She was happy with plan.

## 2019-05-29 NOTE — PATIENT INSTRUCTIONS
Learning About Breast Cancer Surgery  What is breast cancer surgery? Surgery is a key part of treatment for breast cancer. The type of surgery you have depends on the size, location, and type of the cancer. It also depends on your health and what is important to you. Your doctor may combine treatments. This is a common way to treat breast cancer. You may have surgery to remove all the cancer that can be seen. After surgery you may also need radiation, chemotherapy, or hormone therapy. These treatments get rid of any cancer cells that may be left. During the surgery, the doctor may remove lymph nodes from the armpit. The lymph nodes will be looked at under a microscope. This is used to check if cancer has spread from the breast into the lymph nodes. What is a lumpectomy? Lumpectomy    Lumpectomy removes the tumor in the breast. The incision is made close to the tumor. This shows common places where the cut is made. Simple mastectomy    Simple mastectomy removes the whole breast, including nipple and skin. This shows where the cut is often made. Nipple-sparing mastectomy with inframammary cut    Nipple-sparing mastectomy removes the whole breast but leaves the skin and nipple. This shows the cut in the curve under the breast (inframammary). Nipple-sparing mastectomy with lateral cut    Nipple-sparing mastectomy removes the whole breast but leaves the skin and nipple. This shows the cut from the nipple along the side of the breast toward the armpit (lateral). Nipple-sparing mastectomy with periareolar cut    Nipple-sparing mastectomy removes the whole breast but leaves the skin and nipple. This shows the cut around the top of the nipple and along the side of the breast toward the armpit (periareolar).   Skin-sparing mastectomy with periareolar cut    Skin-sparing mastectomy removes the whole breast and the nipple, but it keeps the skin that covers the breast. This shows the cut around the nipple (periareloar). Skin-sparing mastectomy with teardrop cut    Skin-sparing mastectomy removes the whole breast and the nipple, but it keeps the skin that covers the breast. This shows the cut around the nipple in a teardrop shape. Skin-sparing mastectomy with tennis racket cut    Skin-sparing mastectomy removes the whole breast and the nipple, but it keeps the skin that covers the breast. This shows the cut around the nipple and along the side of the breast toward the armpit (tennis racket shape). What can you expect after surgery? Your doctor will send the breast tissue to a lab for testing. This will help the doctor know more about the type of cancer you have. It may take up to a week or more to get the results back. Your doctor will discuss the results with you. You may meet with a doctor who specializes in cancer treatment (an oncologist). This doctor can help you decide about any other treatment you may need. Your needs and values are important when choosing a treatment that is right for you. The amount of time you will need to recover depends on the type of surgery you had. It also depends on whether you need any more treatment. If you had a lumpectomy, you will probably look the same in a bra. But your breasts may not match in size or shape after surgery. This depends on the size of your breasts and how much tissue was removed. Surgery to create a new breast is called reconstruction. This may be done at the same time as a mastectomy. Or it may be done later. Some women choose not to do reconstruction at all. You choose what feels right for you. No matter what kind of surgery you have, you will get information about your treatment. This includes how to prepare, what to expect, and what to do afterward. Follow-up care is a key part of your treatment and safety. Be sure to make and go to all appointments, and call your doctor if you are having problems.  It's also a good idea to know your test results and keep a list of the medicines you take. Where can you learn more? Go to http://cesar-tracee.info/. Enter P020 in the search box to learn more about \"Learning About Breast Cancer Surgery. \"  Current as of: March 27, 2018  Content Version: 11.9  © 9269-2403 [employer]. Care instructions adapted under license by [employer] (which disclaims liability or warranty for this information). If you have questions about a medical condition or this instruction, always ask your healthcare professional. [address] any warranty or liability for your use of this information.

## 2019-06-07 ENCOUNTER — OFFICE VISIT (OUTPATIENT)
Dept: SURGERY | Age: 44
End: 2019-06-07

## 2019-06-07 VITALS
SYSTOLIC BLOOD PRESSURE: 95 MMHG | HEIGHT: 68 IN | DIASTOLIC BLOOD PRESSURE: 55 MMHG | WEIGHT: 159 LBS | HEART RATE: 62 BPM | BODY MASS INDEX: 24.1 KG/M2

## 2019-06-07 DIAGNOSIS — Z98.82 PRESENCE OF RIGHT BREAST IMPLANT: ICD-10-CM

## 2019-06-07 DIAGNOSIS — C50.512 MALIGNANT NEOPLASM OF LOWER-OUTER QUADRANT OF LEFT BREAST OF FEMALE, ESTROGEN RECEPTOR POSITIVE (HCC): Primary | ICD-10-CM

## 2019-06-07 DIAGNOSIS — Z17.0 MALIGNANT NEOPLASM OF LOWER-OUTER QUADRANT OF LEFT BREAST OF FEMALE, ESTROGEN RECEPTOR POSITIVE (HCC): Primary | ICD-10-CM

## 2019-06-07 NOTE — PROGRESS NOTES
HISTORY OF PRESENT ILLNESS Mannie Ana is a 37 y.o. female. HPI ESTABLISHED patient with LEFT breast cancer, s/p XRT, last time on 5/15/19. The skin peeling from XRT she has had is improving. She has started tamoxifen. She is here with her  to discuss surgery. The patient desires a mastectomy and reconstruction of the RIGHT breast. Dr. Wilma Valle is her plastic surgeon.  
  
Breast cancer history-  
7/31/18 - nipple delay and ALND, +4/20 LN, Dr. Alejandro Blackwood and Dr. Wilma Valle. 8/14/18 - LEFT breast skin and nipple sparing mastectomy with reconstruction, port insertion, RIGHT breast augmentation.   Tumor 3.0 cm. 41 yo female with left breast T2 N2 ILC and LCIS grade 2 Er/Pr + Her 2 pal negative. mammaprint high risk. Margins clear.  
12/2018 - completed adjuvant chemotherapy AC x 4 and 6 weeks of Taxol - Dr. Villarreal Coins early due to ear problems). 5/15/19 - completed radiation therapy - Dr. Kiana Kapadia 
  
Family history- No family history of breast or ovarian cancer. Her mother had non-hodgkin's lymphoma. 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 
  
Breast imaging- 
6/25/18 Breast MRI. RIGHT breast dx [facility] and US 2/2019. [facility] Physical Exam 
 
ASSESSMENT and PLAN 
{ASSESSMENT/PLAN:[id number]}

## 2019-06-07 NOTE — COMMUNICATION BODY
HISTORY OF PRESENT ILLNESS  Tessie Brand is a 37 y.o. female. HPI  ESTABLISHED patient with LEFT breast cancer, s/p XRT, last time on 5/15/19. The skin peeling from XRT she has had is improving. She has started tamoxifen. She is here with her  to discuss surgery. The patient desires a mastectomy and reconstruction of the RIGHT breast. Dr. Emigdio Schofield is her plastic surgeon.      Breast cancer history-   18 - nipple delay and ALND, + LN, Dr. Tiffanie Ko and Dr. Emigdio Schofield. 18 - LEFT breast skin and nipple sparing mastectomy with reconstruction, port insertion, RIGHT breast augmentation.   Tumor 3.0 cm. 43 yo female with left breast T2 N2 ILC and LCIS grade 2 Er/Pr + Her 2 pal negative. mammaprint high risk. Margins clear.   2018 - completed adjuvant chemotherapy AC x 4 and 6 weeks of Taxol - Dr. Ami Interiano early due to ear problems). 5/15/19 - completed radiation therapy - Dr. Dayne Velez     Family history-  No family history of breast or ovarian cancer. Her mother had non-hodgkin's lymphoma. 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     Breast imaging-  18 Breast MRI. RIGHT breast dx [facility] and US 2019.       Past Medical History:   Diagnosis Date    Acquired hypothyroidism     on levothyroxine     Anemia NEC     slow fe     Breast cancer ([facility] [address] 2018    left    Cancer (HCC)     LEFT BREAST    GERD (gastroesophageal reflux disease)     Hashimoto thyroiditis, fibrous variant     HX OTHER MEDICAL      live baby boy         Past Surgical History:   Procedure Laterality Date    HX BREAST RECONSTRUCTION Bilateral 2018    BREAST RECONSTRUCTION performed by Ana Richardson MD at [address] [facility] [place] EXTRACTION    HX MASTECTOMY Bilateral 2018    LEFT BREAST SKIN AND NIPPLE sparing MASTECTOMY, LEFT INSERTION BREAST IMPLANT WITH ALLODERM PLACEMENT, RIGHT BREAST AUGMENTATION, PORT A CATH INSERTION performed by Jm Galicia MD at [facility] AMBULATORY OR    HX OTHER SURGICAL      facial surgery    IMPLANT BREAST SILICONE/EQ Bilateral        Social History     Socioeconomic History    Marital status:      Spouse name: Not on file    Number of children: Not on file    Years of education: Not on file    Highest education level: Not on file   Occupational History    Not on file   Social Needs    Financial resource strain: Not on file    Food insecurity:     Worry: Not on file     Inability: Not on file    Transportation needs:     Medical: Not on file     Non-medical: Not on file   Tobacco Use    Smoking status: Former Smoker     Last attempt to quit:      Years since quittin.4    Smokeless tobacco: Never Used   Substance and Sexual Activity    Alcohol use: No    Drug use: No    Sexual activity: Yes     Partners: Male   Lifestyle    Physical activity:     Days per week: Not on file     Minutes per session: Not on file    Stress: Not on file   Relationships    Social connections:     Talks on phone: Not on file     Gets together: Not on file     Attends Orthodox service: Not on file     Active member of club or organization: Not on file     Attends meetings of clubs or organizations: Not on file     Relationship status: Not on file    Intimate partner violence:     Fear of current or ex partner: Not on file     Emotionally abused: Not on file     Physically abused: Not on file     Forced sexual activity: Not on file   Other Topics Concern    Not on file   Social History Narrative    Not on file       Current Outpatient Medications on File Prior to Visit   Medication Sig Dispense Refill    tamoxifen (NOLVADEX) 20 mg tablet       calcium carb/vit D3/minerals (CALCIUM-VITAMIN D PO) Take  by mouth.  SYNTHROID 150 mcg tablet 150 mcg.  Lactobacillus acidophilus (PROBIOTIC PO) Take  by mouth. No current facility-administered medications on file prior to visit.         No Known Allergies    OB History        2    Para   2    Term   2            AB        Living   2       SAB        TAB        Ectopic        Molar        Multiple        Live Births   1          Obstetric Comments   Menarche:  15. LMP: 18. # of Children:  2. Age at Delivery of First Child:  35.   Hysterectomy/oophorectomy:  NO/NO. Breast Bx:  yes. Hx of Breast Feeding:  yes. BCP:  yes. Hormone therapy:  no.                  ROS    Physical Exam   Cardiovascular: Normal rate, regular rhythm and normal heart sounds. Pulmonary/Chest: Breath sounds normal. Right breast exhibits no inverted nipple, no mass, no nipple discharge, no skin change and no tenderness. Left breast exhibits skin change (s/p XRT). Left breast exhibits no inverted nipple, no mass, no nipple discharge and no tenderness. Breasts are symmetrical.       Lymphadenopathy:        Right cervical: No superficial cervical, no deep cervical and no posterior cervical adenopathy present. Left cervical: No superficial cervical, no deep cervical and no posterior cervical adenopathy present. She has no axillary adenopathy. Right axillary: No pectoral and no lateral adenopathy present. Left axillary: No pectoral and no lateral adenopathy present. ASSESSMENT and PLAN    ICD-10-CM ICD-9-CM    1. Malignant neoplasm of left female breast, unspecified estrogen receptor status, unspecified site of breast (HCC) C50.912 174.9    2. Presence of right breast implant Z98.82 V43.82       Patient presents for pre-op discussion prior to prophylactic RIGHT mastectomy with reconstruction by Dr. Michelle Connor, and is doing well overall. Well healed incision s/p LEFT mastectomy, no evidence of recurrence. Well healed s/p RIGHT breast augmentation, with normal implant. Discussed plan for prophylactic RIGHT mastectomy, with nipple delay and PAC removal at [facility].  This will be followed 2 weeks later by mastectomy and reconstruction (implant or expander, TBD by Dr. Danny Mcdowell) at [facility]. Will discuss with Dr. Danny Mcdowell to determine whether to remove existing implant during nipple delay or mastectomy. Pt saw Dr. Danny Mcdowell after XRT, and plans to f/u with her again before mastectomy and reconstruction. She is scheduled for breast MRI in late June. Surgery dates are scheduled at [facility] for the fall. Will f/u with MRI results and for any additional planning, and proceed with surgery as scheduled at [facility]. This plan was reviewed with the patient and patient agrees. All questions were answered.     Written by Nieves Boudreaux, as dictated by Dr. Addie Hernandez MD.

## 2019-06-07 NOTE — PATIENT INSTRUCTIONS
Learning About Mastectomy  What is a mastectomy? A mastectomy is surgery to remove the breast. Every woman's treatment plan and breast surgery are different. Your doctor will tell you about your surgery and whether any lymph nodes will be removed. When you find out that you have cancer, you may feel many emotions and may need some help coping. Seek out family, friends, and counselors for support. You also can do things at home to make yourself feel better while you go through treatment. Call the [employer] (3-654.214.2716) or visit its website at [employer] for more information. How is this surgery done? · In a simple or total mastectomy, the entire breast is removed. The lymph nodes may be removed. This surgery is often done for women who have ductal carcinoma in situ (DCIS) or for women who have invasive breast cancer. This surgery is also used for women who are having a breast or both breasts removed to prevent breast cancer. · In a modified radical mastectomy, the entire breast and the lymph nodes under the arm (axillary lymph nodes) are removed. · In breast-conserving surgery, the tumor and some healthy breast tissue are removed. Most of the breast remains. The doctor may remove only the cancer and a small part of the breast or up to about a quarter of the breast. The amount of breast tissue that is removed is different for each surgery. · A skin-sparing technique may be used for a simple or modified radical mastectomy. The breast tissue is removed through a cut that is made around the nipple. This technique does not harm the skin, and sometimes the nipple can be saved. Lymph nodes may be removed through the same cut made around the nipple or through another cut in the armpit. · A radical mastectomy is very rarely done. In this surgery, the entire breast, all of the lymph nodes in the armpit, muscles under the chest, and some of the surrounding fatty tissue are removed.  It is used only when a woman has many tumors and when cancer has entered the chest.  The type of surgery you have depends on:  · The tumor size, type, and location. · The size of your breast.  · The cancer stage. · Whether or not the cancer has spread to the lymph nodes. · Whether or not you have had radiation treatment. · Your age and health. You and your doctor can decide which surgery is right for you. What can you expect after surgery? Recovery  Breast cancer surgery helps many women go on to lead normal lives. Your outcome depends on many things, especially the stage of the cancer. You will probably be able to go back to work or your normal routine in 3 to 6 weeks. This depends on the type of work you do and any further treatment. Talk with your doctor about other treatment you may need. Your personal preferences and considerations are important when choosing a treatment that is right for you. Lymph nodes  If you had an axillary lymph node dissection at the time of your surgery, many lymph nodes were removed from your armpit area. Without these lymph nodes, your arm may swell. This is called lymphedema. You will have to take good care of your affected arm. Do not carry heavy things with that arm. Wear loose sleeves and bracelets. Your doctor or physical therapist can teach you arm exercises that will let you move your arm as you always have. Before you get blood pressure tests, blood draws, or shots in that arm, tell your doctor that you had lymph nodes removed. Appearance  You will have a scar, but it will fade in time. You have some choices in how you look. Talk to your doctor about breast forms. Ask about reconstructive surgery. This can sometimes be done at the same time as the mastectomy. Follow-up care is a key part of your treatment and safety. Be sure to make and go to all appointments, and call your doctor if you are having problems.  It's also a good idea to know your test results and keep a list of the medicines you take. Where can you learn more? Go to http://cesar-tracee.info/. Enter D620 in the search box to learn more about \"Learning About Mastectomy. \"  Current as of: March 27, 2018  Content Version: 11.9  © 6552-2229 [employer]. Care instructions adapted under license by [employer] (which disclaims liability or warranty for this information). If you have questions about a medical condition or this instruction, always ask your healthcare professional. [address] any warranty or liability for your use of this information.

## 2019-06-24 ENCOUNTER — HOSPITAL ENCOUNTER (OUTPATIENT)
Dept: MRI IMAGING | Age: 44
Discharge: HOME | End: 2019-06-24
Attending: SURGERY
Payer: COMMERCIAL

## 2019-06-24 VITALS — WEIGHT: 154 LBS | BODY MASS INDEX: 23.42 KG/M2

## 2019-06-24 DIAGNOSIS — Z91.89 AT HIGH RISK FOR BREAST CANCER: ICD-10-CM

## 2019-06-24 DIAGNOSIS — Z85.3 HISTORY OF LEFT BREAST CANCER: ICD-10-CM

## 2019-06-24 PROCEDURE — 74011250636 HC RX REV CODE- 250/636: Performed by: RADIOLOGY

## 2019-06-24 PROCEDURE — 77049 MRI BREAST C-+ W/CAD BI: CPT

## 2019-06-24 PROCEDURE — A9585 GADOBUTROL INJECTION: HCPCS | Performed by: RADIOLOGY

## 2019-06-24 PROCEDURE — 77030021566

## 2019-06-24 RX ADMIN — GADOBUTROL 6 ML: 604.72 INJECTION INTRAVENOUS at 14:44

## 2019-06-25 ENCOUNTER — TELEPHONE (OUTPATIENT)
Dept: SURGERY | Age: 44
End: 2019-06-25

## 2019-07-31 DIAGNOSIS — D05.11 INTRADUCTAL CARCINOMA IN SITU OF RIGHT BREAST: Primary | ICD-10-CM

## 2019-08-08 ENCOUNTER — TELEPHONE (OUTPATIENT)
Dept: SURGERY | Age: 44
End: 2019-08-08

## 2019-08-08 ENCOUNTER — OFFICE VISIT (OUTPATIENT)
Dept: SURGERY | Age: 44
End: 2019-08-08

## 2019-08-08 VITALS
SYSTOLIC BLOOD PRESSURE: 117 MMHG | DIASTOLIC BLOOD PRESSURE: 70 MMHG | HEIGHT: 68 IN | WEIGHT: 154 LBS | BODY MASS INDEX: 23.34 KG/M2 | HEART RATE: 68 BPM

## 2019-08-08 DIAGNOSIS — C50.512 MALIGNANT NEOPLASM OF LOWER-OUTER QUADRANT OF LEFT BREAST OF FEMALE, ESTROGEN RECEPTOR POSITIVE (HCC): Primary | ICD-10-CM

## 2019-08-08 DIAGNOSIS — Z90.12 S/P MASTECTOMY, LEFT: ICD-10-CM

## 2019-08-08 DIAGNOSIS — Z17.0 MALIGNANT NEOPLASM OF LOWER-OUTER QUADRANT OF LEFT BREAST OF FEMALE, ESTROGEN RECEPTOR POSITIVE (HCC): Primary | ICD-10-CM

## 2019-08-08 NOTE — PATIENT INSTRUCTIONS
Exercising After a Mastectomy: Care Instructions  Your Care Instructions  During a mastectomy, your doctor removed your breast and may have removed some of the lymph nodes from under your arm. You may feel some pain going down your arm. Your shoulder and arm may be stiff and hard to move. You may also have some loss of feeling there. Take good care of your arm on the side of your surgery. Your doctor or physical therapist can teach you arm exercises that will let you move your arm as you always have. But be careful not to overuse your arm. For example, do not lift anything heavy with your arm until your doctor says it is okay. The basic exercises described here will help you start moving your arm. Follow-up care is a key part of your treatment and safety. Be sure to make and go to all appointments, and call your doctor if you are having problems. It's also a good idea to know your test results and keep a list of the medicines you take. Before you start to exercise  · Do not start to exercise until your doctor says it is okay. If you are not sure how to do an exercise, do not start it until your doctor or physical therapist shows you exactly how to do it. · Stop exercising if your arm or chest area hurts or begins to swell. Talk with your doctor about your symptoms. Also call your doctor if you have problems doing an exercise or it hurts. · Wear loose-fitting clothing while you do your exercises. · Use your arm for your usual activities, such as brushing your teeth and hair. How to do shoulder relaxation exercises  How to do shoulder relaxation exercises    1. Try one or both of these exercises to relax your shoulders:  ? Shoulder shrugs: While you sit or stand, lift your shoulders up toward your ears. Relax your shoulders, and let them drop to their usual position. ? Shoulder rolls: While you sit or stand, roll your shoulders from front to back and up to down.     How to do a shoulder blade stretch    1. While you sit, stretch your arms behind your back, and grasp your hands together. 2. Hold for 5 seconds. 3. Repeat 2 or 3 times. How to do an overhead reach    1. While you sit or stand, clasp your hands together and extend your elbows. 2. Lift your arms up toward your head. 3. Hold for 3 seconds. 4. Repeat 2 or 3 times. How to do a shoulder pinch    1. While you sit or stand, clasp your hands behind your head. 2. Bring your elbows back, and squeeze your shoulder blades together. Keep your shoulders back and down. 3. Hold for 5 seconds. 4. Repeat 2 or 3 times. How to do a wall climb    1. While you stand, face a wall, with your hands on the wall. 2. Walk your fingers up the wall until you feel a stretch. 3. Hold that position for 5 seconds. 4. Slowly walk your fingers back down to the starting position. 5. Repeat 2 or 3 times. How to do elbow circles    1. While you sit or stand, put your right hand on your right shoulder, and your left hand on your left shoulder. 2. Raise your elbows until you feel a stretch. 3. Make circles with your elbows. Start small and then make larger circles. 4. Change direction with your circles. 5. Repeat 2 or 3 times. Where can you learn more? Go to http://cesar-tracee.info/. Enter L521 in the search box to learn more about \"Exercising After a Mastectomy: Care Instructions. \"  Current as of: December 19, 2018  Content Version: 12.1  © 1343-8140 [employer]. Care instructions adapted under license by [employer] (which disclaims liability or warranty for this information). If you have questions about a medical condition or this instruction, always ask your healthcare professional. [address] any warranty or liability for your use of this information.

## 2019-08-08 NOTE — PROGRESS NOTES
HISTORY OF PRESENT ILLNESS  Maurice Archibald is a 37 y.o. female. HPI ESTABLISHED patient here for follow up LEFT breast cancer. She is experiencing tightness in LEFT axilla. Has not been to PT for a while. She had gone to PT previously which helped, especially massage in the tight area. Preparing for surgery with Dr. Garcia La Salle 85 nipple delay of RIGHT nipple and port removal.  Dr. Cale Flowers is her plastic surgeon.     Breast cancer history-   18 - nipple delay and ALND, + LN, Dr. Tacho Sepulveda and Dr. Cale Flowers. 18 - LEFT breast skin and nipple sparing mastectomy with reconstruction, port insertion, RIGHT breast augmentation.   Tumor 3.0 cm. 41 yo female with left breast T2 N2 ILC and LCIS grade 2 Er/Pr + Her 2 pal negative. mammaprint high risk. Margins clear.   2018 - completed adjuvant chemotherapy AC x 4 and 6 weeks of Taxol - Dr. Ann Livingston early due to ear problems). 5/15/19 - completed radiation therapy - Dr. Андрей Lind  Tamoxifen  19 - pending RIGHT nipple delay and port removal with Dr. Garcia La Salle    OB History        2    Para   2    Term   2            AB        Living   2       SAB        TAB        Ectopic        Molar        Multiple        Live Births   1          Obstetric Comments   Menarche:  15. LMP: 18. # of Children:  2. Age at Delivery of First Child:  35.   Hysterectomy/oophorectomy:  NO/NO. Breast Bx:  yes. Hx of Breast Feeding:  yes. BCP:  yes.  Hormone therapy:  no.                Past Surgical History:   Procedure Laterality Date    HX BREAST RECONSTRUCTION Bilateral 2018    BREAST RECONSTRUCTION performed by Yenni Funk MD at [address] [facility] [place] EXTRACTION    HX MASTECTOMY Bilateral 2018    LEFT BREAST SKIN AND NIPPLE sparing MASTECTOMY, LEFT INSERTION BREAST IMPLANT WITH ALLODERM PLACEMENT, RIGHT BREAST AUGMENTATION, PORT A CATH INSERTION performed by Katy Knight MD at [address] [place] [address] OR    HX OTHER SURGICAL      facial surgery    IMPLANT BREAST SILICONE/EQ Bilateral 72/7497      Family history-  No family history of breast or ovarian cancer. Her mother had non-hodgkin's lymphoma. 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     Breast imaging-  RIGHT breast dx [facility] and US 2/2019. MRI Results (most recent):  Results from Hospital Encounter encounter on 06/24/19   MRI BREAST BI W WO CONT    Narrative EXAM: BILATERAL BREAST MRI WITH CONTRAST    CLINICAL INDICATION: History of left breast cancer risk of breast cancer. TECHNIQUE: Patient lay prone in a dedicated breast coil. Axial fat-suppressed T2  weighted spin-echo sequences and T1 weighted fat-suppressed 3D gradient-echo  sequences before gadolinium were performed through both breasts. Five sequential  thin section axial and one sagittal T1 weighted fat-suppressed 3D gradient-echo  sequences were then obtained post contrast. 6 cc Gadavist contrast agent was  used. Subtraction and MIP images were also obtained. Images were reviewed on a  high resolution monitor and optimally windowed. The images were analyzed using  CAD analysis, enhancement curves, digital subtraction, and 2 and 3 dimensional  reconstructions. COMPARISON: MRI 6/25/2018 and prior mammograms. FINDINGS:     Background enhancement: Minimal.    Right Breast: Subpectoral breast implant redemonstrated without significant  interval change. There are no enhancing lesions to suggest invasive breast  carcinoma. Left Breast: Postsurgical changes of skin sparing mastectomy with implant  reconstruction. There are no enhancing lesions to suggest invasive breast  carcinoma. Lymph: Unremarkable. Chest wall and visualized abdomen: Unremarkable. Impression IMPRESSION:  No evidence of invasive breast carcinoma. BI-RADS Assessment  Category 2: Benign finding.        ROS    Physical Exam   Constitutional: She appears well-developed and well-nourished.    Pulmonary/Chest: Musculoskeletal: Normal range of motion. FROM bilaterally, but tightness through left axila   Lymphadenopathy:     She has no axillary adenopathy. Skin: Skin is warm, dry and intact. Chest and breasts examined   Psychiatric: She has a normal mood and affect. Her speech is normal and behavior is normal.     Visit Vitals  /70   Pulse 68   Ht 5' 8\" (1.727 m)   Wt 154 lb (69.9 kg)   BMI 23.42 kg/m²     ASSESSMENT and PLAN  Encounter Diagnoses   Name Primary?  Malignant neoplasm of lower-outer quadrant of left breast of female, estrogen receptor positive ([facility] [address] Yes    S/P mastectomy, left      Left axillary tightness - will refer to PT - was previously seen at [facility] location. Preparing for right mastectomy. Will hopefully follow-up with PT for left side prior to surgery and work them then on right sided issues if they should occur after surgery. Has surgical follow-up with Dr. Triston Nunes.

## 2019-08-08 NOTE — TELEPHONE ENCOUNTER
Please refer to PT - prefers [id number] location and was seen there in the past.    Left arm ROM issue after mastectomy and lymph node surgery and XRT.

## 2019-08-12 NOTE — TELEPHONE ENCOUNTER
PHYSICAL THERAPY SCHEDULED AT [facility] 8/20/2019,  PATIENT TO ARRIVE AT 1:30 PM.  PT FOR LEFT ARM ROM- FOLLOW UP MASTECTOMY

## 2019-08-26 ENCOUNTER — HOSPITAL ENCOUNTER (OUTPATIENT)
Dept: PREADMISSION TESTING | Age: 44
Discharge: HOME | End: 2019-08-26
Payer: COMMERCIAL

## 2019-08-26 VITALS
DIASTOLIC BLOOD PRESSURE: 74 MMHG | HEART RATE: 62 BPM | HEIGHT: 68 IN | WEIGHT: 156.38 LBS | SYSTOLIC BLOOD PRESSURE: 117 MMHG | BODY MASS INDEX: 23.7 KG/M2 | TEMPERATURE: 98.1 F | RESPIRATION RATE: 16 BRPM

## 2019-08-26 LAB
ANION GAP SERPL CALC-SCNC: 7 MMOL/L (ref 5–15)
BASOPHILS # BLD: 0.1 K/UL (ref 0–0.1)
BASOPHILS NFR BLD: 1 % (ref 0–1)
BUN SERPL-MCNC: 23 MG/DL (ref 6–20)
BUN/CREAT SERPL: 28 (ref 12–20)
CALCIUM SERPL-MCNC: 8.8 MG/DL (ref 8.5–10.1)
CHLORIDE SERPL-SCNC: 109 MMOL/L (ref 97–108)
CO2 SERPL-SCNC: 25 MMOL/L (ref 21–32)
CREAT SERPL-MCNC: 0.83 MG/DL (ref 0.55–1.02)
DIFFERENTIAL METHOD BLD: NORMAL
EOSINOPHIL # BLD: 0.1 K/UL (ref 0–0.4)
EOSINOPHIL NFR BLD: 1 % (ref 0–7)
ERYTHROCYTE [DISTWIDTH] IN BLOOD BY AUTOMATED COUNT: 12.2 % (ref 11.5–14.5)
GLUCOSE SERPL-MCNC: 88 MG/DL (ref 65–100)
HCT VFR BLD AUTO: 38 % (ref 35–47)
HGB BLD-MCNC: 12.5 G/DL (ref 11.5–16)
IMM GRANULOCYTES # BLD AUTO: 0 K/UL (ref 0–0.04)
IMM GRANULOCYTES NFR BLD AUTO: 0 % (ref 0–0.5)
LYMPHOCYTES # BLD: 1 K/UL (ref 0.8–3.5)
LYMPHOCYTES NFR BLD: 17 % (ref 12–49)
MCH RBC QN AUTO: 31.2 PG (ref 26–34)
MCHC RBC AUTO-ENTMCNC: 32.9 G/DL (ref 30–36.5)
MCV RBC AUTO: 94.8 FL (ref 80–99)
MONOCYTES # BLD: 0.5 K/UL (ref 0–1)
MONOCYTES NFR BLD: 8 % (ref 5–13)
NEUTS SEG # BLD: 3.9 K/UL (ref 1.8–8)
NEUTS SEG NFR BLD: 73 % (ref 32–75)
NRBC # BLD: 0 K/UL (ref 0–0.01)
NRBC BLD-RTO: 0 PER 100 WBC
PLATELET # BLD AUTO: 184 K/UL (ref 150–400)
PMV BLD AUTO: 9.7 FL (ref 8.9–12.9)
POTASSIUM SERPL-SCNC: 3.9 MMOL/L (ref 3.5–5.1)
RBC # BLD AUTO: 4.01 M/UL (ref 3.8–5.2)
SODIUM SERPL-SCNC: 141 MMOL/L (ref 136–145)
WBC # BLD AUTO: 5.5 K/UL (ref 3.6–11)

## 2019-08-26 PROCEDURE — 36415 COLL VENOUS BLD VENIPUNCTURE: CPT

## 2019-08-26 PROCEDURE — 85025 COMPLETE CBC W/AUTO DIFF WBC: CPT

## 2019-08-26 PROCEDURE — 80048 BASIC METABOLIC PNL TOTAL CA: CPT

## 2019-08-26 RX ORDER — LEVOTHYROXINE SODIUM 150 UG/1
100 TABLET ORAL
COMMUNITY

## 2019-08-27 ENCOUNTER — DOCUMENTATION ONLY (OUTPATIENT)
Dept: SURGERY | Age: 44
End: 2019-08-27

## 2019-08-27 NOTE — PROGRESS NOTES
Received faxed labs from [facility]. BUN and BUN/creatinine ratio are both slightly high. Will inform Dr. Den Brooke.

## 2019-08-28 ENCOUNTER — ANESTHESIA EVENT (OUTPATIENT)
Dept: MEDSURG UNIT | Age: 44
End: 2019-08-28
Payer: COMMERCIAL

## 2019-08-29 ENCOUNTER — ANESTHESIA (OUTPATIENT)
Dept: MEDSURG UNIT | Age: 44
End: 2019-08-29
Payer: COMMERCIAL

## 2019-08-29 ENCOUNTER — HOSPITAL ENCOUNTER (OUTPATIENT)
Age: 44
Setting detail: OUTPATIENT SURGERY
Discharge: HOME | End: 2019-08-29
Attending: SURGERY | Admitting: SURGERY
Payer: COMMERCIAL

## 2019-08-29 VITALS
BODY MASS INDEX: 23.64 KG/M2 | HEIGHT: 68 IN | WEIGHT: 156 LBS | RESPIRATION RATE: 13 BRPM | OXYGEN SATURATION: 98 % | SYSTOLIC BLOOD PRESSURE: 111 MMHG | TEMPERATURE: 96 F | DIASTOLIC BLOOD PRESSURE: 78 MMHG | HEART RATE: 58 BPM

## 2019-08-29 DIAGNOSIS — D05.11 INTRADUCTAL CARCINOMA IN SITU OF RIGHT BREAST: ICD-10-CM

## 2019-08-29 LAB — HCG UR QL: NEGATIVE

## 2019-08-29 PROCEDURE — 77030018836 HC SOL IRR NACL ICUM -A: Performed by: SURGERY

## 2019-08-29 PROCEDURE — 74011250636 HC RX REV CODE- 250/636: Performed by: SURGERY

## 2019-08-29 PROCEDURE — 77030031139 HC SUT VCRL2 J&J -A: Performed by: SURGERY

## 2019-08-29 PROCEDURE — 76210000036 HC AMBSU PH I REC 1.5 TO 2 HR: Performed by: SURGERY

## 2019-08-29 PROCEDURE — 77030011267 HC ELECTRD BLD COVD -A: Performed by: SURGERY

## 2019-08-29 PROCEDURE — 88305 TISSUE EXAM BY PATHOLOGIST: CPT

## 2019-08-29 PROCEDURE — 77030031304 HC WAVWGD EIGR DISP INVO -D: Performed by: SURGERY

## 2019-08-29 PROCEDURE — 76060000062 HC AMB SURG ANES 1 TO 1.5 HR: Performed by: SURGERY

## 2019-08-29 PROCEDURE — 77030020782 HC GWN BAIR PAWS FLX 3M -B

## 2019-08-29 PROCEDURE — 74011250637 HC RX REV CODE- 250/637: Performed by: ANESTHESIOLOGY

## 2019-08-29 PROCEDURE — 77030011640 HC PAD GRND REM COVD -A: Performed by: SURGERY

## 2019-08-29 PROCEDURE — 74011000250 HC RX REV CODE- 250: Performed by: SURGERY

## 2019-08-29 PROCEDURE — 76030000001 HC AMB SURG OR TIME 1 TO 1.5: Performed by: SURGERY

## 2019-08-29 PROCEDURE — 74011250636 HC RX REV CODE- 250/636: Performed by: NURSE ANESTHETIST, CERTIFIED REGISTERED

## 2019-08-29 PROCEDURE — 74011250636 HC RX REV CODE- 250/636

## 2019-08-29 PROCEDURE — 81025 URINE PREGNANCY TEST: CPT

## 2019-08-29 PROCEDURE — 77030010507 HC ADH SKN DERMBND J&J -B: Performed by: SURGERY

## 2019-08-29 PROCEDURE — 76210000046 HC AMBSU PH II REC FIRST 0.5 HR: Performed by: SURGERY

## 2019-08-29 PROCEDURE — 77030040361 HC SLV COMPR DVT MDII -B: Performed by: SURGERY

## 2019-08-29 PROCEDURE — 77030002933 HC SUT MCRYL J&J -A: Performed by: SURGERY

## 2019-08-29 RX ORDER — CEFAZOLIN SODIUM/WATER 2 G/20 ML
2 SYRINGE (ML) INTRAVENOUS
Status: DISCONTINUED | OUTPATIENT
Start: 2019-08-29 | End: 2019-08-29 | Stop reason: HOSPADM

## 2019-08-29 RX ORDER — LIDOCAINE HYDROCHLORIDE 10 MG/ML
0.5 INJECTION, SOLUTION EPIDURAL; INFILTRATION; INTRACAUDAL; PERINEURAL AS NEEDED
Status: DISCONTINUED | OUTPATIENT
Start: 2019-08-29 | End: 2019-08-29 | Stop reason: HOSPADM

## 2019-08-29 RX ORDER — ONDANSETRON 2 MG/ML
4 INJECTION INTRAMUSCULAR; INTRAVENOUS AS NEEDED
Status: DISCONTINUED | OUTPATIENT
Start: 2019-08-29 | End: 2019-08-29 | Stop reason: HOSPADM

## 2019-08-29 RX ORDER — ACETAMINOPHEN 325 MG/1
650 TABLET ORAL ONCE
Status: COMPLETED | OUTPATIENT
Start: 2019-08-29 | End: 2019-08-29

## 2019-08-29 RX ORDER — SODIUM CHLORIDE, SODIUM LACTATE, POTASSIUM CHLORIDE, CALCIUM CHLORIDE 600; 310; 30; 20 MG/100ML; MG/100ML; MG/100ML; MG/100ML
INJECTION, SOLUTION INTRAVENOUS
Status: DISCONTINUED | OUTPATIENT
Start: 2019-08-29 | End: 2019-08-29 | Stop reason: HOSPADM

## 2019-08-29 RX ORDER — MIDAZOLAM HYDROCHLORIDE 1 MG/ML
1 INJECTION, SOLUTION INTRAMUSCULAR; INTRAVENOUS
Status: DISCONTINUED | OUTPATIENT
Start: 2019-08-29 | End: 2019-08-29 | Stop reason: HOSPADM

## 2019-08-29 RX ORDER — FENTANYL CITRATE 50 UG/ML
25 INJECTION, SOLUTION INTRAMUSCULAR; INTRAVENOUS
Status: DISCONTINUED | OUTPATIENT
Start: 2019-08-29 | End: 2019-08-29 | Stop reason: HOSPADM

## 2019-08-29 RX ORDER — DEXTROSE, SODIUM CHLORIDE, SODIUM LACTATE, POTASSIUM CHLORIDE, AND CALCIUM CHLORIDE 5; .6; .31; .03; .02 G/100ML; G/100ML; G/100ML; G/100ML; G/100ML
125 INJECTION, SOLUTION INTRAVENOUS CONTINUOUS
Status: DISCONTINUED | OUTPATIENT
Start: 2019-08-29 | End: 2019-08-29 | Stop reason: HOSPADM

## 2019-08-29 RX ORDER — LIDOCAINE HYDROCHLORIDE 10 MG/ML
30 INJECTION INFILTRATION; PERINEURAL ONCE
Status: DISCONTINUED | OUTPATIENT
Start: 2019-08-29 | End: 2019-08-29 | Stop reason: HOSPADM

## 2019-08-29 RX ORDER — MIDAZOLAM HYDROCHLORIDE 1 MG/ML
1 INJECTION, SOLUTION INTRAMUSCULAR; INTRAVENOUS AS NEEDED
Status: DISCONTINUED | OUTPATIENT
Start: 2019-08-29 | End: 2019-08-29 | Stop reason: HOSPADM

## 2019-08-29 RX ORDER — DEXAMETHASONE SODIUM PHOSPHATE 4 MG/ML
INJECTION, SOLUTION INTRA-ARTICULAR; INTRALESIONAL; INTRAMUSCULAR; INTRAVENOUS; SOFT TISSUE AS NEEDED
Status: DISCONTINUED | OUTPATIENT
Start: 2019-08-29 | End: 2019-08-29 | Stop reason: HOSPADM

## 2019-08-29 RX ORDER — SODIUM CHLORIDE, SODIUM LACTATE, POTASSIUM CHLORIDE, CALCIUM CHLORIDE 600; 310; 30; 20 MG/100ML; MG/100ML; MG/100ML; MG/100ML
125 INJECTION, SOLUTION INTRAVENOUS CONTINUOUS
Status: DISCONTINUED | OUTPATIENT
Start: 2019-08-29 | End: 2019-08-29 | Stop reason: HOSPADM

## 2019-08-29 RX ORDER — SODIUM CHLORIDE 0.9 % (FLUSH) 0.9 %
5-40 SYRINGE (ML) INJECTION AS NEEDED
Status: DISCONTINUED | OUTPATIENT
Start: 2019-08-29 | End: 2019-08-29 | Stop reason: HOSPADM

## 2019-08-29 RX ORDER — SODIUM CHLORIDE 0.9 % (FLUSH) 0.9 %
5-40 SYRINGE (ML) INJECTION EVERY 8 HOURS
Status: DISCONTINUED | OUTPATIENT
Start: 2019-08-29 | End: 2019-08-29 | Stop reason: HOSPADM

## 2019-08-29 RX ORDER — BUPIVACAINE HYDROCHLORIDE 5 MG/ML
20 INJECTION, SOLUTION EPIDURAL; INTRACAUDAL ONCE
Status: DISCONTINUED | OUTPATIENT
Start: 2019-08-29 | End: 2019-08-29 | Stop reason: HOSPADM

## 2019-08-29 RX ORDER — OXYCODONE HYDROCHLORIDE 5 MG/1
5 TABLET ORAL AS NEEDED
Status: DISCONTINUED | OUTPATIENT
Start: 2019-08-29 | End: 2019-08-29 | Stop reason: HOSPADM

## 2019-08-29 RX ORDER — CEFAZOLIN SODIUM 1 G/3ML
INJECTION, POWDER, FOR SOLUTION INTRAMUSCULAR; INTRAVENOUS AS NEEDED
Status: DISCONTINUED | OUTPATIENT
Start: 2019-08-29 | End: 2019-08-29 | Stop reason: HOSPADM

## 2019-08-29 RX ORDER — FENTANYL CITRATE 50 UG/ML
50 INJECTION, SOLUTION INTRAMUSCULAR; INTRAVENOUS AS NEEDED
Status: DISCONTINUED | OUTPATIENT
Start: 2019-08-29 | End: 2019-08-29 | Stop reason: HOSPADM

## 2019-08-29 RX ORDER — MORPHINE SULFATE 10 MG/ML
2 INJECTION, SOLUTION INTRAMUSCULAR; INTRAVENOUS
Status: DISCONTINUED | OUTPATIENT
Start: 2019-08-29 | End: 2019-08-29 | Stop reason: HOSPADM

## 2019-08-29 RX ORDER — MIDAZOLAM HYDROCHLORIDE 1 MG/ML
INJECTION, SOLUTION INTRAMUSCULAR; INTRAVENOUS AS NEEDED
Status: DISCONTINUED | OUTPATIENT
Start: 2019-08-29 | End: 2019-08-29 | Stop reason: HOSPADM

## 2019-08-29 RX ORDER — FENTANYL CITRATE 50 UG/ML
INJECTION, SOLUTION INTRAMUSCULAR; INTRAVENOUS AS NEEDED
Status: DISCONTINUED | OUTPATIENT
Start: 2019-08-29 | End: 2019-08-29 | Stop reason: HOSPADM

## 2019-08-29 RX ORDER — LIDOCAINE HYDROCHLORIDE 20 MG/ML
INJECTION, SOLUTION EPIDURAL; INFILTRATION; INTRACAUDAL; PERINEURAL AS NEEDED
Status: DISCONTINUED | OUTPATIENT
Start: 2019-08-29 | End: 2019-08-29 | Stop reason: HOSPADM

## 2019-08-29 RX ORDER — DIPHENHYDRAMINE HYDROCHLORIDE 50 MG/ML
12.5 INJECTION, SOLUTION INTRAMUSCULAR; INTRAVENOUS AS NEEDED
Status: DISCONTINUED | OUTPATIENT
Start: 2019-08-29 | End: 2019-08-29 | Stop reason: HOSPADM

## 2019-08-29 RX ORDER — PROPOFOL 10 MG/ML
INJECTION, EMULSION INTRAVENOUS AS NEEDED
Status: DISCONTINUED | OUTPATIENT
Start: 2019-08-29 | End: 2019-08-29 | Stop reason: HOSPADM

## 2019-08-29 RX ORDER — PHENYLEPHRINE HCL IN 0.9% NACL 0.4MG/10ML
SYRINGE (ML) INTRAVENOUS AS NEEDED
Status: DISCONTINUED | OUTPATIENT
Start: 2019-08-29 | End: 2019-08-29 | Stop reason: HOSPADM

## 2019-08-29 RX ADMIN — PROPOFOL 150 MG: 10 INJECTION, EMULSION INTRAVENOUS at 10:26

## 2019-08-29 RX ADMIN — PROPOFOL 50 MG: 10 INJECTION, EMULSION INTRAVENOUS at 10:29

## 2019-08-29 RX ADMIN — Medication 80 MCG: at 11:00

## 2019-08-29 RX ADMIN — FENTANYL CITRATE 25 MCG: 50 INJECTION, SOLUTION INTRAMUSCULAR; INTRAVENOUS at 10:44

## 2019-08-29 RX ADMIN — FENTANYL CITRATE 25 MCG: 50 INJECTION, SOLUTION INTRAMUSCULAR; INTRAVENOUS at 12:56

## 2019-08-29 RX ADMIN — Medication 80 MCG: at 10:50

## 2019-08-29 RX ADMIN — Medication 40 MCG: at 11:11

## 2019-08-29 RX ADMIN — FENTANYL CITRATE 25 MCG: 50 INJECTION, SOLUTION INTRAMUSCULAR; INTRAVENOUS at 10:43

## 2019-08-29 RX ADMIN — MIDAZOLAM 2 MG: 1 INJECTION INTRAMUSCULAR; INTRAVENOUS at 10:22

## 2019-08-29 RX ADMIN — SODIUM CHLORIDE, POTASSIUM CHLORIDE, SODIUM LACTATE AND CALCIUM CHLORIDE: 600; 310; 30; 20 INJECTION, SOLUTION INTRAVENOUS at 10:21

## 2019-08-29 RX ADMIN — CEFAZOLIN 2 G: 330 INJECTION, POWDER, FOR SOLUTION INTRAMUSCULAR; INTRAVENOUS at 11:46

## 2019-08-29 RX ADMIN — MIDAZOLAM 2 MG: 1 INJECTION INTRAMUSCULAR; INTRAVENOUS at 10:23

## 2019-08-29 RX ADMIN — Medication 40 MCG: at 10:46

## 2019-08-29 RX ADMIN — PROPOFOL 100 MG: 10 INJECTION, EMULSION INTRAVENOUS at 10:45

## 2019-08-29 RX ADMIN — LIDOCAINE HYDROCHLORIDE 60 MG: 20 INJECTION, SOLUTION EPIDURAL; INFILTRATION; INTRACAUDAL; PERINEURAL at 10:25

## 2019-08-29 RX ADMIN — ACETAMINOPHEN 650 MG: 325 TABLET, FILM COATED ORAL at 09:19

## 2019-08-29 RX ADMIN — DEXAMETHASONE SODIUM PHOSPHATE 4 MG: 4 INJECTION, SOLUTION INTRAMUSCULAR; INTRAVENOUS at 10:45

## 2019-08-29 RX ADMIN — OXYCODONE HYDROCHLORIDE 5 MG: 5 TABLET ORAL at 12:33

## 2019-08-29 RX ADMIN — Medication 40 MCG: at 11:10

## 2019-08-29 RX ADMIN — Medication 40 MCG: at 10:48

## 2019-08-29 RX ADMIN — Medication 40 MCG: at 10:51

## 2019-08-29 RX ADMIN — Medication 40 MCG: at 10:45

## 2019-08-29 RX ADMIN — Medication 40 MCG: at 10:43

## 2019-08-29 RX ADMIN — Medication 40 MCG: at 10:49

## 2019-08-29 NOTE — H&P
HISTORY OF PRESENT ILLNESS  Jose James is a 37 y.o. female. HPI  ESTABLISHED patient with LEFT breast cancer, s/p XRT, last time on 5/15/19.  The skin peeling from XRT she has had is improving. She has started tamoxifen. She is here with her  to discuss surgery.     The patient desires a mastectomy and reconstruction of the RIGHT breast. Dr. Myron Mendiola is her plastic surgeon.      Breast cancer history-   18 - nipple delay and ALND, + LN, Dr. Silvia Ramirez and Dr. Myron Mendiola. 18 - LEFT breast skin and nipple sparing mastectomy with reconstruction, port insertion, RIGHT breast augmentation.   Tumor 3.0 cm. 43 yo female with left breast T2 N2 ILC and LCIS grade 2 Er/Pr + Her 2 pal negative. mammaprint high risk. Margins clear.   2018 - completed adjuvant chemotherapy AC x 4 and 6 weeks of Taxol - Dr. Daniela Dickens early due to ear problems). 5/15/19 - completed radiation therapy - Dr. Bess Carrier     Family history-  No family history of breast or ovarian cancer. Her mother had non-hodgkin's lymphoma. 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     Breast imaging-  18 Breast MRI.   RIGHT breast dx [facility] and US 2019.             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 boy                 Past Surgical History:   Procedure Laterality Date    HX BREAST RECONSTRUCTION Bilateral 2018     BREAST RECONSTRUCTION performed by Jackie Arambula MD at [address]X HEENT         WISDOM TEETH EXTRACTION    HX MASTECTOMY Bilateral 2018     LEFT BREAST SKIN AND NIPPLE sparing MASTECTOMY, LEFT INSERTION BREAST IMPLANT WITH ALLODERM PLACEMENT, RIGHT BREAST AUGMENTATION, PORT A CATH INSERTION performed by Pippa Aceves MD at [address] HX OTHER SURGICAL         facial surgery    IMPLANT BREAST SILICONE/EQ Bilateral 10/9629         Social History            Socioeconomic History    Marital status:        Spouse name: Not on file    Number of children: Not on file    Years of education: Not on file    Highest education level: Not on file   Occupational History    Not on file   Social Needs    Financial resource strain: Not on file    Food insecurity:       Worry: Not on file       Inability: Not on file    Transportation needs:       Medical: Not on file       Non-medical: Not on file   Tobacco Use    Smoking status: Former Smoker       Last attempt to quit:        Years since quittin.4    Smokeless tobacco: Never Used   Substance and Sexual Activity    Alcohol use: No    Drug use: No    Sexual activity: Yes       Partners: Male   Lifestyle    Physical activity:       Days per week: Not on file       Minutes per session: Not on file    Stress: Not on file   Relationships    Social connections:       Talks on phone: Not on file       Gets together: Not on file       Attends Hinduism service: Not on file       Active member of club or organization: Not on file       Attends meetings of clubs or organizations: Not on file       Relationship status: Not on file    Intimate partner violence:       Fear of current or ex partner: Not on file       Emotionally abused: Not on file       Physically abused: Not on file       Forced sexual activity: Not on file   Other Topics Concern    Not on file   Social History Narrative    Not on file                Current Outpatient Medications on File Prior to Visit   Medication Sig Dispense Refill    tamoxifen (NOLVADEX) 20 mg tablet          calcium carb/vit D3/minerals (CALCIUM-VITAMIN D PO) Take  by mouth.        SYNTHROID 150 mcg tablet 150 mcg.        Lactobacillus acidophilus (PROBIOTIC PO) Take  by mouth.        No current facility-administered medications on file prior to visit.          No Known Allergies              OB History         2    Para   2    Term   2            AB        Living   2        SAB        TAB        Ectopic        Molar        Multiple        Live Births   1           Obstetric Comments   Menarche:  15. LMP: 18. # of Children:  2. Age at Delivery of First Child:  35.   Hysterectomy/oophorectomy:  NO/NO. Breast Bx:  yes. Hx of Breast Feeding:  yes. BCP:  yes. Hormone therapy:  no.                       ROS     Physical Exam   Cardiovascular: Normal rate, regular rhythm and normal heart sounds. Pulmonary/Chest: Breath sounds normal. Right breast exhibits no inverted nipple, no mass, no nipple discharge, no skin change and no tenderness. Left breast exhibits skin change (s/p XRT). Left breast exhibits no inverted nipple, no mass, no nipple discharge and no tenderness. Breasts are symmetrical.       Lymphadenopathy:        Right cervical: No superficial cervical, no deep cervical and no posterior cervical adenopathy present. Left cervical: No superficial cervical, no deep cervical and no posterior cervical adenopathy present. She has no axillary adenopathy. Right axillary: No pectoral and no lateral adenopathy present. Left axillary: No pectoral and no lateral adenopathy present.        ASSESSMENT and PLAN      ICD-10-CM ICD-9-CM     1. Malignant neoplasm of left female breast, unspecified estrogen receptor status, unspecified site of [facility] C50.912 174. 9     2. Presence of right breast implant Z98.82 V43.82        Patient presents for pre-op discussion prior to prophylactic RIGHT mastectomy with reconstruction by Dr. Daniel Dietrich, and is doing well overall. Well healed incision s/p LEFT mastectomy, no evidence of recurrence. Well healed s/p RIGHT breast augmentation, with normal implant.  Discussed plan for prophylactic RIGHT mastectomy, with nipple delay and PAC removal at [address] [place] [address]. This will be followed 2 weeks later by mastectomy and reconstruction (implant or expander, TBD by Dr. Shannan Gomez) at [address] [place] [address]. Will discuss with Dr. Shannan Gomez to determine whether to remove existing implant during nipple delay or mastectomy.     Pt saw Dr. Shannan Gomez after XRT, and plans to f/u with her again before mastectomy and reconstruction. She is scheduled for breast MRI in late June. Surgery dates are scheduled at [address] [place] [address] for the fall. Will f/u with MRI results and for any additional planning, and proceed with surgery as scheduled at [address] [place] [address]. This plan was reviewed with the patient and patient agrees. All questions were answered.

## 2019-08-29 NOTE — OP NOTES
[address]   OPERATIVE REPORT    Name:  Brown Keith  MR#:  [id number]  :  1975  ACCOUNT #:  [de-identified]  DATE OF SERVICE:  2019    PREOPERATIVE DIAGNOSIS:  Carcinoma of the left breast status post left mastectomy with reconstruction. POSTOPERATIVE DIAGNOSIS:  Carcinoma of the left breast status post left mastectomy with reconstruction with desire for right risk reducing mastectomy. OPERATIVE PROCEDURE:  Right surgical nipple delay with right nipple biopsy. SURGEON:  Nuria Cortes. Radha Nelson MD    ASSISTANT:  Papo Briscoe. ANESTHESIA:  General.    COMPLICATIONS:  None. SPECIMENS REMOVED:  Right nipple biopsy. IMPLANTS:  None. ESTIMATED BLOOD LOSS:  Minimal.    INDICATIONS:  The patient is a 44-year-old female, who has had a left mastectomy for carcinoma, who has opted for right prophylactic mastectomy. She is admitted for surgical nipple delay in preparation for skin and nipple sparing mastectomy. PROCEDURE:  After satisfactory induction of general LMA anesthesia, the patient was prepped and draped in the usual sterile fashion. An inframammary incision was made and deepened through the subcutaneous tissue with Bovie cautery. Skin flaps were raised in the mastectomy plane 5 cm circumferentially around the nipple. Posterior nipple biopsy was then performed and sent for permanent section. All dissection planes were hemostatic. The wound was anesthetized with 0.5% plain Marcaine and closed with an interrupted 3-0 Vicryl and a running subcuticular 4-0 Monocryl on the skin. The patient tolerated the procedure well. No immediate complications. She was taken to the recovery room in stable condition.       Mackey Skiff, MD      [id number]  D:  2019 11:41  T:  2019 13:03  JOB #:  [id number]  CC:  MD Toma Meng MD Arron Roy, MD Robet Kerbs, MD Saralyn Applebaum, MD Melville Saha, MD

## 2019-08-29 NOTE — BRIEF OP NOTE
BRIEF OPERATIVE NOTE    Date of Procedure: 8/29/2019   Preoperative Diagnosis: RIGHT BREAST CANCER  Postoperative Diagnosis: RIGHT BREAST CANCER    Procedure(s):  RIGHT BREAST NIPPLE DELAY  Surgeon(s) and Role:     * Guzman Ann MD - Primary         Surgical Assistant: Rodolfo Lewis    Surgical Staff:  Circ-1: Suha Larkin, RN  Scrub RN-1: Danny Collier RN  Surg Asst-1: Royal Johnson RN  Event Time In Time Out   Incision Start 1051    Incision Close 1126      Anesthesia: General   Estimated Blood Loss: minimal  Specimens:   ID Type Source Tests Collected by Time Destination   1 : RIGHT NIPPLE BIOPSY Fresh Breast  Guzman Ann MD 8/29/2019 1111 Pathology      Findings: nipple bx for permanent section   Complications: none  Implants: * No implants in log *

## 2019-08-29 NOTE — DISCHARGE INSTRUCTIONS
Discharge Instructions from Dr. Meka Martin    · I will call you with the pathology results, typically within 1 week from today. · You may shower, but no hot tubs, swimming pools, or baths until your incision is healed. · No heavy lifting with the affected extremity (nothing greater than 5 pounds), and limit its use for the next 4-5 days. · NO ICE  · Follow medication instructions carefully. · Watch for signs of infection as listed below. · Redness  · Swelling  · Drainage from the incision or from your nipple that appears infected  · Fever over 101 degrees for consecutive readings, or over 99.5 if you are currently undergoing chemotherapy. · Call our office (number is below) for a follow-up appointment. · If you have any problems, our phone number is [phone]. Oxycodone was given at 1230. No additional pain medication until 430 pm today. DISCHARGE SUMMARY from Nurse    PATIENT INSTRUCTIONS:    After general anesthesia or intravenous sedation, for 24 hours or while taking prescription Narcotics:  · Limit your activities  · Do not drive and operate hazardous machinery  · Do not make important personal or business decisions  · Do  not drink alcoholic beverages  · If you have not urinated within 8 hours after discharge, please contact your surgeon on call. Report the following to your surgeon:  · Excessive pain, swelling, redness or odor of or around the surgical area  · Temperature over 100.5  · Nausea and vomiting lasting longer than 4 hours or if unable to take medications  · Any signs of decreased circulation or nerve impairment to extremity: change in color, persistent  numbness, tingling, coldness or increase pain  · Any questions    What to do at Home:  Recommended activity: See surgical instructions,     If you experience any of the following symptoms as instructed, please follow up with Dr Meka Martin.     *  Please give a list of your current medications to your Primary Care Provider. *  Please update this list whenever your medications are discontinued, doses are      changed, or new medications (including over-the-counter products) are added. *  Please carry medication information at all times in case of emergency situations. These are general instructions for a healthy lifestyle:    No smoking/ No tobacco products/ Avoid exposure to second hand smoke  Surgeon General's Warning:  Quitting smoking now greatly reduces serious risk to your health. Obesity, smoking, and sedentary lifestyle greatly increases your risk for illness    A healthy diet, regular physical exercise & weight monitoring are important for maintaining a healthy lifestyle    You may be retaining fluid if you have a history of heart failure or if you experience any of the following symptoms:  Weight gain of 3 pounds or more overnight or 5 pounds in a week, increased swelling in our hands or feet or shortness of breath while lying flat in bed. Please call your doctor as soon as you notice any of these symptoms; do not wait until your next office visit. The discharge information has been reviewed with the patient and spouse. The patient and spouse verbalized understanding. Discharge medications reviewed with the patient and spouse and appropriate educational materials and side effects teaching were provided.   ___________________________________________________________________________________________________________________________________

## 2019-08-29 NOTE — ANESTHESIA POSTPROCEDURE EVALUATION
Procedure(s):  RIGHT BREAST NIPPLE DELAY. general    Anesthesia Post Evaluation        Patient location during evaluation: PACU  Patient participation: complete - patient participated  Level of consciousness: awake and alert  Pain management: adequate  Airway patency: patent  Anesthetic complications: no  Cardiovascular status: acceptable  Respiratory status: acceptable  Hydration status: acceptable  Comments: I have seen and evaluated the patient and is ready for discharge. Teresa Ocampo MD    Post anesthesia nausea and vomiting:  none      Vitals Value Taken Time   /82 8/29/2019  1:00 PM   Temp 35.6 °C (96 °F) 8/29/2019 11:50 AM   Pulse 67 8/29/2019  1:04 PM   Resp 18 8/29/2019  1:04 PM   SpO2 100 % 8/29/2019  1:04 PM   Vitals shown include unvalidated device data.

## 2019-09-03 ENCOUNTER — TELEPHONE (OUTPATIENT)
Dept: SURGERY | Age: 44
End: 2019-09-03

## 2019-09-16 ENCOUNTER — TELEPHONE (OUTPATIENT)
Dept: SURGERY | Age: 44
End: 2019-09-16

## 2019-10-02 ENCOUNTER — OFFICE VISIT (OUTPATIENT)
Dept: SURGERY | Age: 44
End: 2019-10-02

## 2019-10-02 VITALS
DIASTOLIC BLOOD PRESSURE: 69 MMHG | HEIGHT: 68 IN | BODY MASS INDEX: 23.64 KG/M2 | HEART RATE: 64 BPM | SYSTOLIC BLOOD PRESSURE: 108 MMHG | WEIGHT: 156 LBS

## 2019-10-02 DIAGNOSIS — C77.3 BREAST CANCER METASTASIZED TO AXILLARY LYMPH NODE, LEFT (HCC): ICD-10-CM

## 2019-10-02 DIAGNOSIS — C50.912 BREAST CANCER METASTASIZED TO AXILLARY LYMPH NODE, LEFT (HCC): ICD-10-CM

## 2019-10-02 NOTE — PROGRESS NOTES
HISTORY OF PRESENT ILLNESS Emma Mandujano is a 37 y.o. female. HPI   ESTABLISHED patient here for surgical follow-up. She is about three weeks S/P RIGHT prophylactic mastectomy with implant reconstruction by Dr. Casey Aragon. Saw Dr. Casey Aragon on 9/23/19, and she was pleased with the results. Had an endometrial biopsy last week and does not know the results yet. Is currently off of her Tamoxifen. 07/31/18: Nipple delay and ALND, +4/20 LN, by Dr. Sara Stone and Dr. Casey Aragon. 
08/14/18: LEFT breast skin and nipple sparing mastectomy with reconstruction, port insertion, RIGHT breast augmentation.   Tumor 3.0 cm. 41 yo female with left breast T2 N2 ILC and LCIS grade 2 Er/Pr + Her 2 pal negative. mammaprint high risk. Margins clear.  
12/2018: Completed adjuvant chemotherapy AC x 4 and 6 weeks of Taxol. Ddiscontinued Taxol early due to ear problems. (Dr. Luann Begum) 05/15/19: Completed radiation therapy. (Dr. Dhruv Ritchie) 08/29/19: RIGHT nipple delay and nipple bx. PATH: No evidence for malignancy. 09/10/19: RIGHT breast simple skin and nipple-sparing mastectomy and reconstruction, by Dr. Yanelis Desai and Dr. Casey Aragon. PATH: Multiple fibroadenomatoid nodules, focal usual-type ductal hyperplasia. ROS Physical Exam 
 
ASSESSMENT and PLAN 
{ASSESSMENT/PLAN:35150}

## 2019-10-02 NOTE — PROGRESS NOTES
HISTORY OF PRESENT ILLNESS  Jesusita Logan is a 37 y.o. female. HPI  ESTABLISHED patient here for surgical follow-up. She is about three weeks S/P RIGHT prophylactic mastectomy with implant reconstruction by Dr. Omaira Jacobson. Saw Dr. Omaira Jacobson on 19, and she was pleased with the results. Had an endometrial biopsy last week and does not know the results yet. Is currently off of her Tamoxifen. 18: Nipple delay and ALND, + LN, by Dr. Roman Paige and Dr. Omaira Jacobson.    18: LEFT breast skin and nipple sparing mastectomy with reconstruction, port insertion, RIGHT breast augmentation.   Tumor 3.0 cm. 41 yo female with left breast T2 N2 ILC and LCIS grade 2 Er/Pr + Her 2 pal negative. mammaprint high risk. Margins clear.     2018: Completed adjuvant chemotherapy AC x 4 and 6 weeks of Taxol. Ddiscontinued Taxol early due to ear problems. (Dr. Esteban Mejía)    05/15/19: Completed radiation therapy. (Dr. Lyssa Bullard)    19: RIGHT nipple delay and nipple bx. PATH: No evidence for malignancy.     09/10/19: RIGHT breast simple skin and nipple-sparing prophylactic mastectomy and reconstruction, by Dr. Jaelyn Mckeon and Dr. Omaira Jacobson. PATH: Multiple fibroadenomatoid nodules, focal usual-type ductal hyperplasia.       Past Medical History:   Diagnosis Date    Acquired hypothyroidism     on levothyroxine     Anemia NEC     slow fe     Breast cancer ([facility] [address] 2018    left    Cancer (HCC)     LEFT BREAST    Hashimoto thyroiditis, fibrous variant     HX OTHER MEDICAL      live baby boy         Past Surgical History:   Procedure Laterality Date    HX BREAST RECONSTRUCTION Bilateral 2018    BREAST RECONSTRUCTION performed by Radha San MD at [address] HX HEENT      WISDOM TEETH EXTRACTION    HX MASTECTOMY Bilateral 2018    LEFT BREAST SKIN AND NIPPLE sparing MASTECTOMY, LEFT INSERTION BREAST IMPLANT WITH ALLODERM PLACEMENT, RIGHT BREAST AUGMENTATION, PORT A CATH INSERTION performed by Adali Merritt MD at [address] HX OTHER SURGICAL      facial surgery    HX VASCULAR ACCESS Right     RIGHT PORT-A-CATH     IMPLANT BREAST SILICONE/EQ Bilateral        Social History     Socioeconomic History    Marital status:      Spouse name: Not on file    Number of children: Not on file    Years of education: Not on file    Highest education level: Not on file   Occupational History    Not on file   Social Needs    Financial resource strain: Not on file    Food insecurity:     Worry: Not on file     Inability: Not on file    Transportation needs:     Medical: Not on file     Non-medical: Not on file   Tobacco Use    Smoking status: Former Smoker     Last attempt to quit: 2002     Years since quittin.7    Smokeless tobacco: Never Used   Substance and Sexual Activity    Alcohol use: No    Drug use: No    Sexual activity: Yes     Partners: Male   Lifestyle    Physical activity:     Days per week: Not on file     Minutes per session: Not on file    Stress: Not on file   Relationships    Social connections:     Talks on phone: Not on file     Gets together: Not on file     Attends Advent service: Not on file     Active member of club or organization: Not on file     Attends meetings of clubs or organizations: Not on file     Relationship status: Not on file    Intimate partner violence:     Fear of current or ex partner: Not on file     Emotionally abused: Not on file     Physically abused: Not on file     Forced sexual activity: Not on file   Other Topics Concern    Not on file   Social History Narrative    Not on file       Current Outpatient Medications on File Prior to Visit   Medication Sig Dispense Refill    levothyroxine (SYNTHROID) 150 mcg tablet Take 150 mcg by mouth nightly.  vitamin E acetate (VITAMIN E PO) Take 1 Tab by mouth every morning.  tamoxifen (NOLVADEX) 20 mg tablet 20 mg every morning.       calcium carb/vit D3/minerals (CALCIUM-VITAMIN D PO) Take 1 Tab by mouth every morning.  Lactobacillus acidophilus (PROBIOTIC PO) Take 1 Tab by mouth every morning. No current facility-administered medications on file prior to visit. No Known Allergies    OB History        2    Para   2    Term   2            AB        Living   2       SAB        TAB        Ectopic        Molar        Multiple        Live Births   1          Obstetric Comments   Menarche:  15. LMP: 18. # of Children:  2. Age at Delivery of First Child:  35.   Hysterectomy/oophorectomy:  NO/NO. Breast Bx:  yes. Hx of Breast Feeding:  yes. BCP:  yes. Hormone therapy:  no.                  ROS    Physical Exam   Cardiovascular: Normal rate, regular rhythm and normal heart sounds. Pulmonary/Chest: Breath sounds normal. Right breast exhibits no inverted nipple, no mass, no nipple discharge, no skin change and no tenderness. Left breast exhibits skin change (s/p XRT). Left breast exhibits no inverted nipple, no mass, no nipple discharge and no tenderness. Breasts are symmetrical.       Lymphadenopathy:        Right cervical: No superficial cervical, no deep cervical and no posterior cervical adenopathy present. Left cervical: No superficial cervical, no deep cervical and no posterior cervical adenopathy present. She has no axillary adenopathy. Right axillary: No pectoral and no lateral adenopathy present. Left axillary: No pectoral and no lateral adenopathy present. ASSESSMENT and PLAN    ICD-10-CM ICD-9-CM    1. Breast cancer metastasized to axillary lymph node, left (HCC) C50.912 174.9     C77.3 196.3       Patient presents for f/u s/p RIGHT nipple delay on 19, and RIGHT nipple-sparing prophylactic mastectomy and reconstruction on 09/10/19, and is doing well overall. Well healed incisions s/p BL mastectomies, no evidence of LEFT recurrence. Pt to continue followup with Dr. Janice Carver and Dr. Penny Wilks.  F/U with Dr. Abdifatah Sesay in 6 months. This plan was reviewed with the patient and patient agrees. All questions were answered.     Written by Parth Chanel, as dictated by Dr. Yanet Chun MD.

## 2019-10-16 NOTE — COMMUNICATION BODY
HISTORY OF PRESENT ILLNESS  Stacie Sandifer is a 37 y.o. female. HPI  ESTABLISHED patient here for surgical follow-up. She is about three weeks S/P RIGHT prophylactic mastectomy with implant reconstruction by Dr. Padmaja Washington. Saw Dr. Padmaja Washington on 19, and she was pleased with the results. Had an endometrial biopsy last week and does not know the results yet. Is currently off of her Tamoxifen. 18: Nipple delay and ALND, + LN, by Dr. Cherrie Donato and Dr. Padmaja Washington.    18: LEFT breast skin and nipple sparing mastectomy with reconstruction, port insertion, RIGHT breast augmentation.   Tumor 3.0 cm. 43 yo female with left breast T2 N2 ILC and LCIS grade 2 Er/Pr + Her 2 pal negative. mammaprint high risk. Margins clear.     2018: Completed adjuvant chemotherapy AC x 4 and 6 weeks of Taxol. Ddiscontinued Taxol early due to ear problems. (Dr. Gustavo Badillo)    05/15/19: Completed radiation therapy. (Dr. Sixto Pichardo)    19: RIGHT nipple delay and nipple bx. PATH: No evidence for malignancy.     09/10/19: RIGHT breast simple skin and nipple-sparing prophylactic mastectomy and reconstruction, by Dr. Terrance Mendieta and Dr. Padmaja Washington. PATH: Multiple fibroadenomatoid nodules, focal usual-type ductal hyperplasia.       Past Medical History:   Diagnosis Date    Acquired hypothyroidism     on levothyroxine     Anemia NEC     slow fe     Breast cancer ([facility] [address] 2018    left    Cancer (HCC)     LEFT BREAST    Hashimoto thyroiditis, fibrous variant     HX OTHER MEDICAL      live baby boy         Past Surgical History:   Procedure Laterality Date    HX BREAST RECONSTRUCTION Bilateral 2018    BREAST RECONSTRUCTION performed by Kiana Mitchell MD at [address] HX HEENT      WISDOM TEETH EXTRACTION    HX MASTECTOMY Bilateral 2018    LEFT BREAST SKIN AND NIPPLE sparing MASTECTOMY, LEFT INSERTION BREAST IMPLANT WITH ALLODERM PLACEMENT, RIGHT BREAST AUGMENTATION, PORT A CATH INSERTION performed by Adali Merritt MD at [address] HX OTHER SURGICAL      facial surgery    HX VASCULAR ACCESS Right     RIGHT PORT-A-CATH     IMPLANT BREAST SILICONE/EQ Bilateral        Social History     Socioeconomic History    Marital status:      Spouse name: Not on file    Number of children: Not on file    Years of education: Not on file    Highest education level: Not on file   Occupational History    Not on file   Social Needs    Financial resource strain: Not on file    Food insecurity:     Worry: Not on file     Inability: Not on file    Transportation needs:     Medical: Not on file     Non-medical: Not on file   Tobacco Use    Smoking status: Former Smoker     Last attempt to quit: 2002     Years since quittin.7    Smokeless tobacco: Never Used   Substance and Sexual Activity    Alcohol use: No    Drug use: No    Sexual activity: Yes     Partners: Male   Lifestyle    Physical activity:     Days per week: Not on file     Minutes per session: Not on file    Stress: Not on file   Relationships    Social connections:     Talks on phone: Not on file     Gets together: Not on file     Attends Gnosticism service: Not on file     Active member of club or organization: Not on file     Attends meetings of clubs or organizations: Not on file     Relationship status: Not on file    Intimate partner violence:     Fear of current or ex partner: Not on file     Emotionally abused: Not on file     Physically abused: Not on file     Forced sexual activity: Not on file   Other Topics Concern    Not on file   Social History Narrative    Not on file       Current Outpatient Medications on File Prior to Visit   Medication Sig Dispense Refill    levothyroxine (SYNTHROID) 150 mcg tablet Take 150 mcg by mouth nightly.  vitamin E acetate (VITAMIN E PO) Take 1 Tab by mouth every morning.  tamoxifen (NOLVADEX) 20 mg tablet 20 mg every morning.       calcium carb/vit D3/minerals (CALCIUM-VITAMIN D PO) Take 1 Tab by mouth every morning.  Lactobacillus acidophilus (PROBIOTIC PO) Take 1 Tab by mouth every morning. No current facility-administered medications on file prior to visit. No Known Allergies    OB History        2    Para   2    Term   2            AB        Living   2       SAB        TAB        Ectopic        Molar        Multiple        Live Births   1          Obstetric Comments   Menarche:  15. LMP: 18. # of Children:  2. Age at Delivery of First Child:  35.   Hysterectomy/oophorectomy:  NO/NO. Breast Bx:  yes. Hx of Breast Feeding:  yes. BCP:  yes. Hormone therapy:  no.                  ROS    Physical Exam   Cardiovascular: Normal rate, regular rhythm and normal heart sounds. Pulmonary/Chest: Breath sounds normal. Right breast exhibits no inverted nipple, no mass, no nipple discharge, no skin change and no tenderness. Left breast exhibits skin change (s/p XRT). Left breast exhibits no inverted nipple, no mass, no nipple discharge and no tenderness. Breasts are symmetrical.       Lymphadenopathy:        Right cervical: No superficial cervical, no deep cervical and no posterior cervical adenopathy present. Left cervical: No superficial cervical, no deep cervical and no posterior cervical adenopathy present. She has no axillary adenopathy. Right axillary: No pectoral and no lateral adenopathy present. Left axillary: No pectoral and no lateral adenopathy present. ASSESSMENT and PLAN    ICD-10-CM ICD-9-CM    1. Breast cancer metastasized to axillary lymph node, left (HCC) C50.912 174.9     C77.3 196.3       Patient presents for f/u s/p RIGHT nipple delay on 19, and RIGHT nipple-sparing prophylactic mastectomy and reconstruction on 09/10/19, and is doing well overall. Well healed incisions s/p BL mastectomies, no evidence of LEFT recurrence. Pt to continue followup with Dr. Claudine Adhikari and Dr. Bret Duron.  F/U with Dr. Cherrie Donato in 6 months. This plan was reviewed with the patient and patient agrees. All questions were answered.     Written by Cathy Hallman, as dictated by Dr. Tejal oCrrea MD.

## 2019-12-05 ENCOUNTER — HOSPITAL ENCOUNTER (OUTPATIENT)
Dept: VASCULAR SURGERY | Age: 44
Discharge: HOME | End: 2019-12-05
Attending: FAMILY MEDICINE
Payer: COMMERCIAL

## 2019-12-05 DIAGNOSIS — M79.604 PAIN OF RIGHT LEG: ICD-10-CM

## 2019-12-05 PROCEDURE — 93971 EXTREMITY STUDY: CPT

## 2020-06-24 ENCOUNTER — OFFICE VISIT (OUTPATIENT)
Dept: SURGERY | Age: 45
End: 2020-06-24

## 2020-06-24 VITALS
WEIGHT: 162 LBS | TEMPERATURE: 97.6 F | BODY MASS INDEX: 24.55 KG/M2 | HEART RATE: 84 BPM | HEIGHT: 68 IN | SYSTOLIC BLOOD PRESSURE: 109 MMHG | DIASTOLIC BLOOD PRESSURE: 68 MMHG

## 2020-06-24 DIAGNOSIS — Z90.13 S/P BILATERAL MASTECTOMY: ICD-10-CM

## 2020-06-24 DIAGNOSIS — Z85.3 HISTORY OF LEFT BREAST CANCER: Primary | ICD-10-CM

## 2020-06-24 RX ORDER — ASCORBIC ACID 250 MG
TABLET ORAL
COMMUNITY

## 2020-06-24 NOTE — LETTER
6/26/20 Patient: Gaetano Zapien YOB: 1975 Date of Visit: 6/24/2020 Wilmer Mon MD 
[facility] [address] [id number] VIA Facsimile: [phone] Dear Wilmer Mon MD, Thank you for referring Ms. Marcia Ledesma to [address] MAIN OFFICE [address] [place] [address] for evaluation. My notes for this consultation are attached. If you have questions, please do not hesitate to call me. I look forward to following your patient along with you. Sincerely, Tessie Foster MD

## 2020-06-24 NOTE — PATIENT INSTRUCTIONS
Eating Healthy Foods: Care Instructions Your Care Instructions Eating healthy foods can help lower your risk for disease. Healthy food gives you energy and keeps your heart strong, your brain active, your muscles working, and your bones strong. A healthy diet includes a variety of foods from the basic food groups: grains, vegetables, fruits, milk and milk products, and meat and beans. Some people may eat more of their favorite foods from only one food group and, as a result, miss getting the nutrients they need. So, it is important to pay attention not only to what you eat but also to what you are missing from your diet. You can eat a healthy, balanced diet by making a few small changes. Follow-up care is a key part of your treatment and safety. Be sure to make and go to all appointments, and call your doctor if you are having problems. It's also a good idea to know your test results and keep a list of the medicines you take. How can you care for yourself at home? Look at what you eat · Keep a food diary for a week or two and record everything you eat or drink. Track the number of servings you eat from each food group. · For a balanced diet every day, eat a variety of: 
? 6 or more ounce-equivalents of grains, such as cereals, breads, crackers, rice, or pasta, every day. An ounce-equivalent is 1 slice of bread, 1 cup of ready-to-eat cereal, or ½ cup of cooked rice, cooked pasta, or cooked cereal. 
? 2½ cups of vegetables, especially: § Dark-green vegetables such as broccoli and spinach. § Orange vegetables such as carrots and sweet potatoes. § Dry beans (such as bower and kidney beans) and peas (such as lentils). ? 2 cups of fresh, frozen, or canned fruit. A small apple or 1 banana or orange equals 1 cup. ? 3 cups of nonfat or low-fat milk, yogurt, or other milk products.  
? 5½ ounces of meat and beans, such as chicken, fish, lean meat, beans, nuts, and seeds. One egg, 1 tablespoon of peanut butter, ½ ounce nuts or seeds, or ¼ cup of cooked beans equals 1 ounce of meat. · Learn how to read food labels for serving sizes and ingredients. Fast-food and convenience-food meals often contain few or no fruits or vegetables. Make sure you eat some fruits and vegetables to make the meal more nutritious. · Look at your food diary. For each food group, add up what you have eaten and then divide the total by the number of days. This will give you an idea of how much you are eating from each food group. See if you can find some ways to change your diet to make it more healthy. Start small · Do not try to make dramatic changes to your diet all at once. You might feel that you are missing out on your favorite foods and then be more likely to fail. · Start slowly, and gradually change your habits. Try some of the following: ? Use whole wheat bread instead of white bread. ? Use nonfat or low-fat milk instead of whole milk. ? Eat brown rice instead of white rice, and eat whole wheat pasta instead of white-flour pasta. ? Try low-fat cheeses and low-fat yogurt. ? Add more fruits and vegetables to meals and have them for snacks. ? Add lettuce, tomato, cucumber, and onion to sandwiches. ? Add fruit to yogurt and cereal. 
Enjoy food · You can still eat your favorite foods. You just may need to eat less of them. If your favorite foods are high in fat, salt, and sugar, limit how often you eat them, but do not cut them out entirely. · Eat a wide variety of foods. Make healthy choices when eating out · The type of restaurant you choose can help you make healthy choices. Even fast-food chains are now offering more low-fat or healthier choices on the menu. · Choose smaller portions, or take half of your meal home. · When eating out, try: ? A veggie pizza with a whole wheat crust or grilled chicken (instead of sausage or pepperoni). ? Pasta with roasted vegetables, grilled chicken, or marinara sauce instead of cream sauce. ? A vegetable wrap or grilled chicken wrap. ? Broiled or poached food instead of fried or breaded items. Make healthy choices easy · Buy packaged, prewashed, ready-to-eat fresh vegetables and fruits, such as baby carrots, salad mixes, and chopped or shredded broccoli and cauliflower. · Buy packaged, presliced fruits, such as melon or pineapple. · Choose 100% fruit or vegetable juice instead of soda. Limit juice intake to 4 to 6 oz (½ to ¾ cup) a day. · Blend low-fat yogurt, fruit juice, and canned or frozen fruit to make a smoothie for breakfast or a snack. Where can you learn more? Go to http://cesar-tracee.info/ Enter T756 in the search box to learn more about \"Eating Healthy Foods: Care Instructions. \" Current as of: August 22, 2019               Content Version: 12.5 © 1618-2849 [employer]. Care instructions adapted under license by [employer] (which disclaims liability or warranty for this information). If you have questions about a medical condition or this instruction, always ask your healthcare professional. [address] any warranty or liability for your use of this information.

## 2020-06-24 NOTE — PROGRESS NOTES
HISTORY OF PRESENT ILLNESS  Balwinder Goodwin is a 40 y.o. female. HPI ESTABLISHED patient here for follow up LEFT breast cancer. No breast problems at this time. She recently had implant exchange and fat grafting done by Dr. Pedro Abel. Healing well. Continues Tamoxifen.       07/31/18: Nipple delay and ALND, +4/20 LN, by Dr. Selena Yadav and Dr. Pedro Abel.     08/14/18: LEFT breast skin and nipple sparing mastectomy with reconstruction, port insertion, RIGHT breast augmentation.   Tumor 3.0 cm. 43 yo female with left breast T2 N2 ILC and LCIS grade 2 Er/Pr + Her 2 pal negative. mammaprint high risk. Margins clear.      12/2018: Completed adjuvant chemotherapy AC x 4 and 6 weeks of Taxol. Ddiscontinued Taxol early due to ear problems. (Dr. Jeffery Clarke)     05/15/19: Completed radiation therapy. (Dr. Matheus Salamanca)     08/29/19: RIGHT nipple delay and nipple bx. PATH: No evidence for malignancy.     09/10/19: RIGHT breast simple skin and nipple-sparing prophylactic mastectomy and reconstruction, by Dr. Zuleyka Alexis and Dr. Pedro Abel. PATH: Multiple fibroadenomatoid nodules, focal usual-type ductal hyperplasia. 6/4/2020: implant exchange and fat grafting done by Dr. Deyanira Rios [facility]   All other systems reviewed and are negative. Physical Exam  Vitals signs and nursing note reviewed. Chest:          Comments: Bilateral breasts surgically absent  Implants intact  steristrips intact healing well        ASSESSMENT and PLAN    ICD-10-CM ICD-9-CM    1. History of left breast cancer Z85.3 V10.3    2. S/P bilateral mastectomy Z90.13 V45.71      - healing well, no evidence local recurrence  - f/u in 1 year.

## 2020-12-11 ENCOUNTER — TRANSCRIBE ORDER (OUTPATIENT)
Dept: SCHEDULING | Age: 45
End: 2020-12-11

## 2020-12-11 DIAGNOSIS — E03.9 HYPOTHYROIDISM, ADULT: Primary | ICD-10-CM

## 2020-12-11 DIAGNOSIS — E04.2 NONTOXIC MULTINODULAR GOITER: ICD-10-CM

## 2020-12-17 ENCOUNTER — HOSPITAL ENCOUNTER (OUTPATIENT)
Dept: ULTRASOUND IMAGING | Age: 45
Discharge: HOME | End: 2020-12-17
Attending: SPECIALIST
Payer: COMMERCIAL

## 2020-12-17 DIAGNOSIS — E04.2 NONTOXIC MULTINODULAR GOITER: ICD-10-CM

## 2020-12-17 DIAGNOSIS — E03.9 HYPOTHYROIDISM, ADULT: ICD-10-CM

## 2020-12-17 PROCEDURE — 76536 US EXAM OF HEAD AND NECK: CPT

## 2021-06-23 ENCOUNTER — OFFICE VISIT (OUTPATIENT)
Dept: SURGERY | Age: 46
End: 2021-06-23
Payer: COMMERCIAL

## 2021-06-23 VITALS
DIASTOLIC BLOOD PRESSURE: 75 MMHG | WEIGHT: 142 LBS | SYSTOLIC BLOOD PRESSURE: 116 MMHG | HEART RATE: 75 BPM | HEIGHT: 68 IN | BODY MASS INDEX: 21.52 KG/M2

## 2021-06-23 DIAGNOSIS — C50.912 BREAST CANCER METASTASIZED TO AXILLARY LYMPH NODE, LEFT (HCC): Primary | ICD-10-CM

## 2021-06-23 DIAGNOSIS — C77.3 BREAST CANCER METASTASIZED TO AXILLARY LYMPH NODE, LEFT (HCC): Primary | ICD-10-CM

## 2021-06-23 DIAGNOSIS — Z90.13 S/P MASTECTOMY, BILATERAL: ICD-10-CM

## 2021-06-23 PROCEDURE — 99213 OFFICE O/P EST LOW 20 MIN: CPT | Performed by: SURGERY

## 2021-06-23 RX ORDER — LANOLIN ALCOHOL/MO/W.PET/CERES
1000 CREAM (GRAM) TOPICAL DAILY
COMMUNITY

## 2021-06-23 NOTE — PATIENT INSTRUCTIONS
Eating Healthy Foods: Care Instructions  Your Care Instructions     Eating healthy foods can help lower your risk for disease. Healthy food gives you energy and keeps your heart strong, your brain active, your muscles working, and your bones strong. A healthy diet includes a variety of foods from the basic food groups: grains, vegetables, fruits, milk and milk products, and meat and beans. Some people may eat more of their favorite foods from only one food group and, as a result, miss getting the nutrients they need. So, it is important to pay attention not only to what you eat but also to what you are missing from your diet. You can eat a healthy, balanced diet by making a few small changes. Follow-up care is a key part of your treatment and safety. Be sure to make and go to all appointments, and call your doctor if you are having problems. It's also a good idea to know your test results and keep a list of the medicines you take. How can you care for yourself at home? Look at what you eat  · Keep a food diary for a week or two and record everything you eat or drink. Track the number of servings you eat from each food group. · For a balanced diet every day, eat a variety of:  ? 6 or more ounce-equivalents of grains, such as cereals, breads, crackers, rice, or pasta, every day. An ounce-equivalent is 1 slice of bread, 1 cup of ready-to-eat cereal, or ½ cup of cooked rice, cooked pasta, or cooked cereal.  ? 2½ cups of vegetables, especially:  § Dark-green vegetables such as broccoli and spinach. § Orange vegetables such as carrots and sweet potatoes. § Dry beans (such as bower and kidney beans) and peas (such as lentils). ? 2 cups of fresh, frozen, or canned fruit. A small apple or 1 banana or orange equals 1 cup. ? 3 cups of nonfat or low-fat milk, yogurt, or other milk products. ? 5½ ounces of meat and beans, such as chicken, fish, lean meat, beans, nuts, and seeds.  One egg, 1 tablespoon of peanut butter, ½ ounce nuts or seeds, or ¼ cup of cooked beans equals 1 ounce of meat. · Learn how to read food labels for serving sizes and ingredients. Fast-food and convenience-food meals often contain few or no fruits or vegetables. Make sure you eat some fruits and vegetables to make the meal more nutritious. · Look at your food diary. For each food group, add up what you have eaten and then divide the total by the number of days. This will give you an idea of how much you are eating from each food group. See if you can find some ways to change your diet to make it more healthy. Start small  · Do not try to make dramatic changes to your diet all at once. You might feel that you are missing out on your favorite foods and then be more likely to fail. · Start slowly, and gradually change your habits. Try some of the following:  ? Use whole wheat bread instead of white bread. ? Use nonfat or low-fat milk instead of whole milk. ? Eat brown rice instead of white rice, and eat whole wheat pasta instead of white-flour pasta. ? Try low-fat cheeses and low-fat yogurt. ? Add more fruits and vegetables to meals and have them for snacks. ? Add lettuce, tomato, cucumber, and onion to sandwiches. ? Add fruit to yogurt and cereal.  Enjoy food  · You can still eat your favorite foods. You just may need to eat less of them. If your favorite foods are high in fat, salt, and sugar, limit how often you eat them, but do not cut them out entirely. · Eat a wide variety of foods. Make healthy choices when eating out  · The type of restaurant you choose can help you make healthy choices. Even fast-food chains are now offering more low-fat or healthier choices on the menu. · Choose smaller portions, or take half of your meal home. · When eating out, try:  ? A veggie pizza with a whole wheat crust or grilled chicken (instead of sausage or pepperoni).   ? Pasta with roasted vegetables, grilled chicken, or marinara sauce instead of cream sauce. ? A vegetable wrap or grilled chicken wrap. ? Broiled or poached food instead of fried or breaded items. Make healthy choices easy  · Buy packaged, prewashed, ready-to-eat fresh vegetables and fruits, such as baby carrots, salad mixes, and chopped or shredded broccoli and cauliflower. · Buy packaged, presliced fruits, such as melon or pineapple. · Choose 100% fruit or vegetable juice instead of soda. Limit juice intake to 4 to 6 oz (½ to ¾ cup) a day. · Blend low-fat yogurt, fruit juice, and canned or frozen fruit to make a smoothie for breakfast or a snack. Where can you learn more? Go to http://www.real.com/  Enter T756 in the search box to learn more about \"Eating Healthy Foods: Care Instructions. \"  Current as of: December 17, 2020               Content Version: 12.8  © 2006-2021 Healthwise, [facility]. Care instructions adapted under license by [employer] (which disclaims liability or warranty for this information). If you have questions about a medical condition or this instruction, always ask your healthcare professional. [place] [address] any warranty or liability for your use of this information.

## 2021-06-23 NOTE — PROGRESS NOTES
HISTORY OF PRESENT ILLNESS  Alis Huizar is a 39 y.o. female. HPI  ESTABLISHED patient here for annual follow up LEFT breast cancer. 07/31/18: Nipple delay and ALND, +4/20 LN, by Dr. Elmer Mena and Dr. Margaret Dang.     08/14/18: LEFT breast skin and nipple sparing mastectomy with reconstruction, port insertion, RIGHT breast augmentation.   Tumor 3.0 cm. 43 yo female with left breast T2 N2 ILC and LCIS grade 2 Er/Pr + Her 2 pal negative. mammaprint high risk. Margins clear.      12/2018: Completed adjuvant chemotherapy AC x 4 and 6 weeks of Taxol. Ddiscontinued Taxol early due to ear problems. (Dr. Bebeto Patton)     05/15/19: Completed radiation therapy. (Dr. Linda Salinas)     08/29/19: RIGHT nipple delay and nipple bx. PATH: No evidence for malignancy.     09/10/19: RIGHT breast simple skin and nipple-sparing prophylactic mastectomy and reconstruction, by Dr. Sohan Triplett and Dr. Margaret Dang. PATH: Multiple fibroadenomatoid nodules, focal usual-type ductal hyperplasia. Review of Systems   All other systems reviewed and are negative. Physical Exam  Vitals and nursing note reviewed. Chest:      Breasts: Breasts are symmetrical.         Right: No inverted nipple, mass, nipple discharge, skin change or tenderness. Left: No inverted nipple, mass, nipple discharge, skin change or tenderness. Comments: Bilateral implants intact   Lymphadenopathy:      Cervical: No cervical adenopathy. Upper Body:      Right upper body: No supraclavicular, axillary or pectoral adenopathy. Left upper body: No supraclavicular, axillary or pectoral adenopathy. ASSESSMENT and PLAN    ICD-10-CM ICD-9-CM    1. Breast cancer metastasized to axillary lymph node, left (HCC)  C50.912 174.9     C77.3 196.3    2. S/P mastectomy, bilateral  Z90.13 V45.71      - no evidence local recurrence  - f/u in 6-12 months with np   20 minutes was spent with patient on counseling and coordination of care.

## 2021-06-23 NOTE — LETTER
6/25/2021    Patient: Zeferino Chiu   YOB: 1975   Date of Visit: 6/23/2021     Edy Pike MD  [address]  [address] [address]  Via Fax: [phone]    Dear Edy Pike MD,      Thank you for referring Ms. Erica Ferraro to [address] [place] [address] for evaluation. My notes for this consultation are attached. If you have questions, please do not hesitate to call me. I look forward to following your patient along with you.       Sincerely,    Ankit Smith MD

## 2022-03-19 PROBLEM — Z17.0 MALIGNANT NEOPLASM OF LOWER-OUTER QUADRANT OF LEFT BREAST OF FEMALE, ESTROGEN RECEPTOR POSITIVE (HCC): Status: ACTIVE | Noted: 2018-06-25

## 2022-03-19 PROBLEM — R50.81 NEUTROPENIC FEVER (HCC): Status: ACTIVE | Noted: 2018-10-23

## 2022-03-19 PROBLEM — C50.912 BREAST CANCER METASTASIZED TO AXILLARY LYMPH NODE, LEFT (HCC): Status: ACTIVE | Noted: 2018-08-14

## 2022-03-19 PROBLEM — C50.512 MALIGNANT NEOPLASM OF LOWER-OUTER QUADRANT OF LEFT BREAST OF FEMALE, ESTROGEN RECEPTOR POSITIVE (HCC): Status: ACTIVE | Noted: 2018-06-25

## 2022-03-19 PROBLEM — D70.9 NEUTROPENIC FEVER (HCC): Status: ACTIVE | Noted: 2018-10-23

## 2022-03-19 PROBLEM — C77.3 BREAST CANCER METASTASIZED TO AXILLARY LYMPH NODE, LEFT (HCC): Status: ACTIVE | Noted: 2018-08-14

## 2022-06-22 ENCOUNTER — OFFICE VISIT (OUTPATIENT)
Dept: SURGERY | Age: 47
End: 2022-06-22
Payer: COMMERCIAL

## 2022-06-22 VITALS
WEIGHT: 142 LBS | BODY MASS INDEX: 21.52 KG/M2 | HEIGHT: 68 IN | SYSTOLIC BLOOD PRESSURE: 105 MMHG | DIASTOLIC BLOOD PRESSURE: 51 MMHG

## 2022-06-22 DIAGNOSIS — C50.912 BREAST CANCER METASTASIZED TO AXILLARY LYMPH NODE, LEFT (HCC): Primary | ICD-10-CM

## 2022-06-22 DIAGNOSIS — C77.3 BREAST CANCER METASTASIZED TO AXILLARY LYMPH NODE, LEFT (HCC): Primary | ICD-10-CM

## 2022-06-22 PROCEDURE — 76642 ULTRASOUND BREAST LIMITED: CPT | Performed by: SURGERY

## 2022-06-22 PROCEDURE — 99214 OFFICE O/P EST MOD 30 MIN: CPT | Performed by: SURGERY

## 2022-06-22 RX ORDER — LEVOTHYROXINE SODIUM 100 UG/1
TABLET ORAL
COMMUNITY

## 2022-06-22 NOTE — PATIENT INSTRUCTIONS

## 2022-06-22 NOTE — PROGRESS NOTES
HISTORY OF PRESENT ILLNESS  Asha Evans is a 55 y.o. female. HPI ESTABLISHED patient here for annual  F/u LEFT breast cancer. Patient states she change from tamoxifen to anastrozole. She is doing well overall. History  07/31/18: Nipple delay and ALND, +4/20 LN, by Dr. Lisa Call and Dr. Prasad Tavera.     08/14/18: LEFT breast skin and nipple sparing mastectomy with reconstruction, port insertion, RIGHT breast augmentation.   Tumor 3.0 cm. 43 yo female with left breast T2 N2 ILC and LCIS grade 2 Er/Pr + Her 2 pal negative. mammaprint high risk. Margins clear.      12/2018: Completed adjuvant chemotherapy AC x 4 and 6 weeks of Taxol. Ddiscontinued Taxol early due to ear problems. (Dr. Charlane Boas)     05/15/19: Completed radiation therapy. (Dr. Kristie Baptiste)     08/29/19: RIGHT nipple delay and nipple bx. PATH: No evidence for malignancy.     09/10/19: RIGHT breast simple skin and nipple-sparing prophylactic mastectomy and reconstruction, by Dr. Narayan Bennett and Dr. Prasad Tavera. PATH: Multiple fibroadenomatoid nodules, focal usual-type ductal hyperplasia. [facility] Results (most recent):  Results from [place] encounter on 02/13/19    [facility] 3D MARIA R W MAMMO RT DX INCL CAD    Addendum 3/22/2019  4:18 PM  Addendum:    Correction to the technique as follows: Tomosynthesis of the right breast was  performed. .    Addendum 2/13/2019  1:34 PM  Addendum:    Correction to impression #2 as follows: Likely minimally complex cyst in the  anterior 3rd of the RIGHT breast at 11-12 o'clock. Narrative  INDICATION:  history left breast cancer and mastectomy,. Personal history of  malignant neoplasm of breast.  COMPARISON: Mammogram(s), most recent, 1/14/2018. Bela Maid COMPOSITION:  There are scattered fibroglandular densities. .  TECHNIQUE:  Standard whole breast and implant displaced, CC and MLO views as well as spot  compression views of the right breast were performed with digital technique and  subjected to computer-aided detection.  Tomosynthesis of each breast was  performed in ML and spot compression projections. Targeted ultrasound of the  right breast was performed. .  COMPOSITION: Mammographically, the breast tissue is with scattered  fibroglandular densities. [place] FINDINGS:  There is a palpable abnormality marker on the lateral aspect of the right  breast, deep 3rd. There is a circumscribed and partially obscured focal  asymmetry associated with this marker. There is a palpable abnormality marker on the 12 o'clock, periareolar right  breast. There is no visible underlying mammographic abnormality. No visualized mass, suspicious microcalcification or architectural distortion. .  Targeted right breast ultrasound:  Simple cyst in the lateral aspect of the right breast at 9-10 o'clock which  measures 1 cm in maximum dimension. There are 2 hypoechoic lesions in continuity with one another in the anterior  3rd at 11-12 o'clock which measure approximately 0.5 cm in maximum dimension. .    Impression  IMPRESSION:  1. Simple cyst in the lateral aspect of the right breast.  BI-RADS Category 2 - Benign findings. 2. Likely minimally complex cyst in the anterior 3rd of the left breast at 11-12  o'clock. BI-RADS Category 3 - Probably benign findings. .  RECOMMENDATION:  Follow-up, right-sided ultrasound in 6 months to ensure stability of the lesion  in the anterior 3rd and 11-12 o'clock. .  The findings of this exam and the recommendation were discussed with the patient  at the time of exam by myself. Review of Systems   All other systems reviewed and are negative. Review of Systems   All other systems reviewed and are negative. Physical Exam  Vitals and nursing note reviewed. Chest:      Comments: Bilateral implants intact  No masses   No adenopathy  Skin darkening on left from xrt  More capsular contracture on left than right      BREAST ULTRASOUND  Indication: Left  breast and axilla  Technique:   The area was scanned using a high-frequency linear-array near-field transducer  Findings: No abnormal mass, lesion, or shadowing noted. No cysts. No adenopathy. intact implant  Impression: Normal   Disposition: No worrisome finding on ultrasound    ASSESSMENT and PLAN    ICD-10-CM ICD-9-CM    1. Breast cancer metastasized to axillary lymph node, left (HCC)  C50.912 174.9     C77.3 196.3      - normal exam and us of axilla  - no evidence local recurrence  Offered mri but no concerning findings today  F/u in 1 year  30 minutes was spent with patient on counseling and coordination of care.

## 2023-01-01 NOTE — PROGRESS NOTES
HISTORY OF PRESENT ILLNESS  Idania Almazan is a 37 y.o. female. HPI  ESTABLISHED patient with LEFT breast cancer, s/p XRT, last time on 5/15/19. The skin peeling from XRT she has had is improving. She has started tamoxifen. She is here with her  to discuss surgery. The patient desires a mastectomy and reconstruction of the RIGHT breast. Dr. Rodney Martinez is her plastic surgeon.      Breast cancer history-   18 - nipple delay and ALND, + LN, Dr. Lani Biswas and Dr. Rodney Martinez. 18 - LEFT breast skin and nipple sparing mastectomy with reconstruction, port insertion, RIGHT breast augmentation.   Tumor 3.0 cm. 43 yo female with left breast T2 N2 ILC and LCIS grade 2 Er/Pr + Her 2 pal negative. mammaprint high risk. Margins clear.   2018 - completed adjuvant chemotherapy AC x 4 and 6 weeks of Taxol - Dr. Christy Lopez early due to ear problems). 5/15/19 - completed radiation therapy - Dr. John Morales     Family history-  No family history of breast or ovarian cancer. Her mother had non-hodgkin's lymphoma. 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     Breast imaging-  18 Breast MRI. RIGHT breast dx [facility] and US 2019.       Past Medical History:   Diagnosis Date    Acquired hypothyroidism     on levothyroxine     Anemia NEC     slow fe     Breast cancer ([address] 2018    left    Cancer (HCC)     LEFT BREAST    GERD (gastroesophageal reflux disease)     Hashimoto thyroiditis, fibrous variant     HX OTHER MEDICAL      live baby boy         Past Surgical History:   Procedure Laterality Date    HX BREAST RECONSTRUCTION Bilateral 2018    BREAST RECONSTRUCTION performed by Tom Vega MD at [address] [facility] [place] EXTRACTION    HX MASTECTOMY Bilateral 2018    LEFT BREAST SKIN AND NIPPLE sparing MASTECTOMY, LEFT INSERTION BREAST IMPLANT WITH ALLODERM PLACEMENT, RIGHT BREAST AUGMENTATION, PORT A CATH INSERTION performed by Juan Vasquez MD at [facility] AMBULATORY OR    HX OTHER SURGICAL      facial surgery    IMPLANT BREAST SILICONE/EQ Bilateral 15/8972       Social History     Socioeconomic History    Marital status:      Spouse name: Not on file    Number of children: Not on file    Years of education: Not on file    Highest education level: Not on file   Occupational History    Not on file   Social Needs    Financial resource strain: Not on file    Food insecurity:     Worry: Not on file     Inability: Not on file    Transportation needs:     Medical: Not on file     Non-medical: Not on file   Tobacco Use    Smoking status: Former Smoker     Last attempt to quit:      Years since quittin.4    Smokeless tobacco: Never Used   Substance and Sexual Activity    Alcohol use: No    Drug use: No    Sexual activity: Yes     Partners: Male   Lifestyle    Physical activity:     Days per week: Not on file     Minutes per session: Not on file    Stress: Not on file   Relationships    Social connections:     Talks on phone: Not on file     Gets together: Not on file     Attends Voodoo service: Not on file     Active member of club or organization: Not on file     Attends meetings of clubs or organizations: Not on file     Relationship status: Not on file    Intimate partner violence:     Fear of current or ex partner: Not on file     Emotionally abused: Not on file     Physically abused: Not on file     Forced sexual activity: Not on file   Other Topics Concern    Not on file   Social History Narrative    Not on file       Current Outpatient Medications on File Prior to Visit   Medication Sig Dispense Refill    tamoxifen (NOLVADEX) 20 mg tablet       calcium carb/vit D3/minerals (CALCIUM-VITAMIN D PO) Take  by mouth.  SYNTHROID 150 mcg tablet 150 mcg.  Lactobacillus acidophilus (PROBIOTIC PO) Take  by mouth. No current facility-administered medications on file prior to visit.         No Known Allergies    OB History        2    Para   2    Term   2            AB        Living   2       SAB        TAB        Ectopic        Molar        Multiple        Live Births   1          Obstetric Comments   Menarche:  15. LMP: 18. # of Children:  2. Age at Delivery of First Child:  35.   Hysterectomy/oophorectomy:  NO/NO. Breast Bx:  yes. Hx of Breast Feeding:  yes. BCP:  yes. Hormone therapy:  no.                  ROS    Physical Exam   Cardiovascular: Normal rate, regular rhythm and normal heart sounds. Pulmonary/Chest: Breath sounds normal. Right breast exhibits no inverted nipple, no mass, no nipple discharge, no skin change and no tenderness. Left breast exhibits skin change (s/p XRT). Left breast exhibits no inverted nipple, no mass, no nipple discharge and no tenderness. Breasts are symmetrical.       Lymphadenopathy:        Right cervical: No superficial cervical, no deep cervical and no posterior cervical adenopathy present. Left cervical: No superficial cervical, no deep cervical and no posterior cervical adenopathy present. She has no axillary adenopathy. Right axillary: No pectoral and no lateral adenopathy present. Left axillary: No pectoral and no lateral adenopathy present. ASSESSMENT and PLAN    ICD-10-CM ICD-9-CM    1. Malignant neoplasm of left female breast, unspecified estrogen receptor status, unspecified site of breast (HCC) C50.912 174.9    2. Presence of right breast implant Z98.82 V43.82       Patient presents for pre-op discussion prior to prophylactic RIGHT mastectomy with reconstruction by Dr. Jane Randle, and is doing well overall. Well healed incision s/p LEFT mastectomy, no evidence of recurrence. Well healed s/p RIGHT breast augmentation, with normal implant. Discussed plan for prophylactic RIGHT mastectomy, with nipple delay and PAC removal at [facility].  This will be followed 2 weeks later by mastectomy and reconstruction (implant or expander, TBD by Dr. Kimmy Yun) at [facility]. Will discuss with Dr. Kimmy Yun to determine whether to remove existing implant during nipple delay or mastectomy. Pt saw Dr. Kimmy Yun after XRT, and plans to f/u with her again before mastectomy and reconstruction. She is scheduled for breast MRI in late June. Surgery dates are scheduled at [facility] for the fall. Will f/u with MRI results and for any additional planning, and proceed with surgery as scheduled at [facility]. This plan was reviewed with the patient and patient agrees. All questions were answered.     Written by Chely Alcocer, as dictated by Dr. Yisel Chong MD. 13.58

## 2023-06-21 ENCOUNTER — OFFICE VISIT (OUTPATIENT)
Age: 48
End: 2023-06-21
Payer: COMMERCIAL

## 2023-06-21 VITALS — BODY MASS INDEX: 22.29 KG/M2 | HEIGHT: 67 IN | WEIGHT: 142 LBS

## 2023-06-21 DIAGNOSIS — Z85.3 HISTORY OF LEFT BREAST CANCER: Primary | ICD-10-CM

## 2023-06-21 PROCEDURE — 99213 OFFICE O/P EST LOW 20 MIN: CPT | Performed by: SURGERY

## 2023-06-21 RX ORDER — LEVOTHYROXINE SODIUM 0.1 MG/1
TABLET ORAL
COMMUNITY
Start: 2021-10-25

## 2023-10-22 NOTE — OP NOTES
[facility]  OPERATIVE REPORT    Malu Hansen  MR#: [id number]  : 1975  ACCOUNT #: [de-identified]   DATE OF SERVICE: 2018    PREOPERATIVE DIAGNOSES:  1.  Left-sided breast cancer. 2.  Acquired breast deformity after left-sided mastectomy. POSTOPERATIVE DIAGNOSIS:  Left-sided breast cancer    PROCEDURES PERFORMED:  Insertion Left Breast Implant with Alloderm Placement and Right Breast Augment    SURGEON:  Dr. Guzman Linear:      ANESTHESIA: General    SPECIMENS REMOVED:0    IMPLANTS: 1    HISTORY OF PRESENT ILLNESS:  This otherwise healthy 71-year-old female has a left-sided breast cancer and unfortunately has positive nodes on this side, so she probably will need radiation on the left and certainly chemotherapy. In preparation for the surgery, in order to keep her nipple and in order to make it more likely to be able to do a direct implant reconstruction, she had a delayed procedure on the left and that was when her subareolar biopsy was fine and her axillary nodes were biopsied. PROCEDURE:  The patient was brought to the operating room and general endotracheal anesthesia was induced without incident. Dr. Mikie Joy performed a left-sided complete skin and nipple-sparing mastectomy through an inframammary fold incision and after she had completed the procedure, new drapes were placed over the old drapes. The patient was given a second gram of Ancef. The left side the wound was irrigated with Ancef, gentamicin, bacitracin and a dilute Betadine and the periphery was then injected with Marcaine with Exparel as well as the drain site tract. On the right side some of that Exparel was injected into the muscle and some was injected laterally within nerves to the nipple are present.   The patient has decided to have a prepectoral reconstruction in the hopes of being able to avoid radiation to her muscle and prepectoral radiated implants although the flaps are somewhat more delicate, long-term are less painful and less distorted. So to that end, a large sheet of AlloDerm had been soaking with Ancef, gentamicin and bacitracin and dilute Betadine and it was perforated on the back table in both vectors using a 15 blade, was then sutured under the clavicle and then sutured folded down and the fold was tacked medially and laterally so that there was a cuff of adherent AlloDerm and not just sutures and then after using a variety of sizers, ultimately on the left side a smooth round silicone cohesive and so this was an SCF cohesive [facility] smooth round silicone 680 mL implant and then the AlloDerm was tucked underneath the implant and then tacked with interrupted 2-0 Vicryl sutures. Next, attention was directed to the breast augmentation. A 4 cm incision was made in the inframammary fold and submuscular pocket was created and after using a variety of sizers a 150 mL smooth round saline implant was placed on the right side. This was filled to 150 mL and this gave the best symmetry of the contralateral side. This pocket was also irrigated with Ancef, gentamicin, bacitracin and dilute Betadine and was also injected with the Exparel and Marcaine mixture. The deep layer was closed with 2-0 Vicryl, the next with 3-0 Vicryl Rapide followed by Phillip Cruz. After the right implant was filled, then, the nipple positioning on the contralateral side was measured and marked and nipple was tacked to the subjacent AlloDerm in order to stabilize the nipple positioning.   Next, the skin was closed after the skin edges were trimmed, again tacking the deep layer to the AlloDerm because it needed to be a little bit higher on the lower pole to match the contralateral side, so the skin flap was affixed to the AlloDerm in multiple points of fixation followed by 3-0 Vicryl Rapide followed by Phillip Cruz followed by Dermabond glue on both incisions and then followed by nitro paste on the lower pole of the left breast followed by Telfa followed by a surgical bra. Should be noted that the breast had been drained with a 15 mm hubless channel drain that was tunneled extensively subcutaneously with a tendon passer and the lateral chest wall was also injected with Marcaine and Exparel for pain control. ESTIMATED BLOOD LOSS:  The estimated blood loss for the procedure was negligible. COMPLICATIONS:  There were no intraoperative complications. The patient tolerated the procedure well and was sent to the recovery room in stable condition.       Sukhi Robles MD       NPB / MN  D: 08/14/2018 15:40     T: 08/14/2018 21:55  JOB #: [id number]  CC: Caro Bullard MD  CC: Deangelo Baum MD PAST MEDICAL HISTORY:  Gall stone     No pertinent past medical history

## 2024-07-24 ENCOUNTER — OFFICE VISIT (OUTPATIENT)
Age: 49
End: 2024-07-24
Payer: COMMERCIAL

## 2024-07-24 VITALS — HEIGHT: 67 IN | BODY MASS INDEX: 22.29 KG/M2 | WEIGHT: 142 LBS

## 2024-07-24 DIAGNOSIS — C77.3 BREAST CANCER METASTASIZED TO AXILLARY LYMPH NODE, LEFT (HCC): Primary | ICD-10-CM

## 2024-07-24 DIAGNOSIS — C50.912 BREAST CANCER METASTASIZED TO AXILLARY LYMPH NODE, LEFT (HCC): Primary | ICD-10-CM

## 2024-07-24 PROCEDURE — 99213 OFFICE O/P EST LOW 20 MIN: CPT | Performed by: SURGERY

## 2024-07-24 NOTE — PROGRESS NOTES
HISTORY OF PRESENT ILLNESS  Madison Najera is a 48 y.o. female     HPI ESTABLISHED patient here for annual follow up appointment.      7/31/18: Nipple delay and ALND, +4/20 LN, by  and Dr. Goldman.  8/14/18: LEFT breast skin and nipple sparing mastectomy with reconstruction, port insertion, RIGHT breast augmentation. Tumor 3.0cm. 41 yo female with LEFT breast T2 N2 ILC and LCIS grade 2 ER/NC+/HER2-  - MammaPrint: high risk, margins clear     12/2018: completed adjuvant chemotherapy AC x 4 and 6 weeks of Taxol.     Ddiscontined Taxol early due to ear problems. (Dr. Greer)     5/15/19: Completed radiation therapy. (Dr. Jose)     8/29/19: RIGHT nipple delay and nipple bx. PATH: no evidence for malignancy.     9/10/19: RIGHT breast simple skin and nipple-sparing prophylactic mastectomy and reconstruction, by Dr. Lam and Dr. Goldman. PATH: Multiple fibroadenomatoid nodules, focal usual-type ductal hyperplasia.        Review of Systems   All other systems reviewed and are negative.        Physical Exam  Vitals and nursing note reviewed.   Chest:   Breasts:     Right: No swelling, bleeding, inverted nipple, mass, nipple discharge, skin change or tenderness.      Left: No swelling, bleeding, inverted nipple, mass, nipple discharge, skin change or tenderness.   Lymphadenopathy:      Upper Body:      Right upper body: No supraclavicular, axillary or pectoral adenopathy.      Left upper body: No supraclavicular, axillary or pectoral adenopathy.            ASSESSMENT and PLAN   Diagnosis Orders   1. Breast cancer metastasized to axillary lymph node, left (HCC)          - no evidence local recurrence   Rtc in 1 year   20 minutes was spent with patient on counseling and coordination of care.

## 2025-08-13 ENCOUNTER — OFFICE VISIT (OUTPATIENT)
Age: 50
End: 2025-08-13
Payer: COMMERCIAL

## 2025-08-13 VITALS — HEIGHT: 67 IN | BODY MASS INDEX: 21.66 KG/M2 | WEIGHT: 138 LBS

## 2025-08-13 DIAGNOSIS — Z85.3 HISTORY OF BREAST CANCER: Primary | ICD-10-CM

## 2025-08-13 DIAGNOSIS — Z90.13 S/P BILATERAL MASTECTOMY: ICD-10-CM

## 2025-08-13 PROCEDURE — 99213 OFFICE O/P EST LOW 20 MIN: CPT | Performed by: SURGERY

## (undated) DEVICE — ABDOMINAL PAD: Brand: DERMACEA

## (undated) DEVICE — HANDLE LT SNAP ON ULT DURABLE LENS FOR TRUMPF ALC DISPOSABLE

## (undated) DEVICE — INTENDED USE FOR SURGICAL MARKING ON INTACT SKIN, ALSO PROVIDES A PERMANENT METHOD OF IDENTIFYING OBJECTS IN THE OPERATING ROOM: Brand: WRITESITE® REGULAR TIP SKIN MARKER

## (undated) DEVICE — SUTURE VCRL SZ 3-0 L27IN ABSRB UD L26MM SH 1/2 CIR J416H

## (undated) DEVICE — MEDI-VAC NON-CONDUCTIVE SUCTION TUBING: Brand: CARDINAL HEALTH

## (undated) DEVICE — SOLUTION IV 1000ML 0.9% SOD CHL

## (undated) DEVICE — SMOKE EVACUATION PENCIL: Brand: VALLEYLAB

## (undated) DEVICE — SUTURE MCRYL SZ 4-0 L27IN ABSRB UD L19MM PS-2 1/2 CIR PRIM Y426H

## (undated) DEVICE — ADHESIVE SKIN CLOSURE HI VISC MIC 0.5 CC PREMIERPRO EXOFIN

## (undated) DEVICE — Device

## (undated) DEVICE — SURGICAL PROCEDURE PACK BASIN MAJ SET CUST NO CAUT

## (undated) DEVICE — SYR 10ML LUER LOK 1/5ML GRAD --

## (undated) DEVICE — 3000CC GUARDIAN II: Brand: GUARDIAN

## (undated) DEVICE — 2DSM19 3-0 UND MONODERM 30X30: Brand: 2DSM19 3-0 UND MONODERM 30X30

## (undated) DEVICE — DRAIN SURG 15FR L3/16IN DIA4.7MM SIL CHN RND FULL FLUT TRCR

## (undated) DEVICE — GARMENT,MEDLINE,DVT,INT,CALF,MED, GEN2: Brand: MEDLINE

## (undated) DEVICE — COVER US PRB W12XL244CM FLD IORT STR TIP

## (undated) DEVICE — KENDALL SCD EXPRESS SLEEVES, KNEE LENGTH, MEDIUM: Brand: KENDALL SCD

## (undated) DEVICE — (D)STRIP SKN CLSR 0.5X4IN WHT --

## (undated) DEVICE — CANISTER, RIGID, 3000CC: Brand: MEDLINE INDUSTRIES, INC.

## (undated) DEVICE — SUTURE VCRL RAPIDE SZ 3-0 L18IN ABSRB UD PS-2 L19MM 3/8 CIR VR497

## (undated) DEVICE — SUTURE PROL SZ 3-0 L18IN NONABSORBABLE BLU L19MM PC-5 3/8 8632G

## (undated) DEVICE — APPLIER LIG CLP 115IN AUTO W SUP INTLOK 30 M TI CLP PREM

## (undated) DEVICE — DEVICE TRNSF SPIK STL 2008S] MICROTEK MEDICAL INC]

## (undated) DEVICE — SIMPLICITY FLUFF UNDERPAD 23X36, MODERATE: Brand: SIMPLICITY

## (undated) DEVICE — RETRACTOR SURG WIDE FLAT LO PROF LTWT PHOTONGUIDE

## (undated) DEVICE — STERILE POLYISOPRENE POWDER-FREE SURGICAL GLOVES: Brand: PROTEXIS

## (undated) DEVICE — SUT PROL 3-0 36IN SH DA BLU --

## (undated) DEVICE — PENCIL ES L10FT COAT BLDE HOLSTER

## (undated) DEVICE — KIT TISS EXP W/ 60ML SYR 122CM TRNSF SET PIERCING DEV L25CM

## (undated) DEVICE — CURVED, SMALL JAW, OPEN SEALER/DIVIDER: Brand: LIGASURE

## (undated) DEVICE — INSULATED BLADE ELECTRODE;CAUTION: FOR MANUFACTURING, PROCESSING, OR REPACKING.: Brand: EDGE

## (undated) DEVICE — REM POLYHESIVE ADULT PATIENT RETURN ELECTRODE: Brand: VALLEYLAB

## (undated) DEVICE — 1200 GUARD II KIT W/5MM TUBE W/O VAC TUBE: Brand: GUARDIAN

## (undated) DEVICE — ROCKER SWITCH PENCIL BLADE ELECTRODE, HOLSTER: Brand: EDGE

## (undated) DEVICE — NEEDLE HYPO 22GA L1.5IN BLK POLYPR HUB S STL REG BVL STR

## (undated) DEVICE — FUNNEL 2 KELLER

## (undated) DEVICE — TOWEL SURG W17XL27IN STD BLU COT NONFENESTRATED PREWASHED

## (undated) DEVICE — STERILE POLYISOPRENE POWDER-FREE SURGICAL GLOVES WITH EMOLLIENT COATING: Brand: PROTEXIS

## (undated) DEVICE — DRAPE,CHEST,FENES,15X10,STERIL: Brand: MEDLINE

## (undated) DEVICE — (D)PREP SKN CHLRAPRP APPL 26ML -- CONVERT TO ITEM 371833

## (undated) DEVICE — INTENDED FOR TISSUE SEPARATION, AND OTHER PROCEDURES THAT REQUIRE A SHARP SURGICAL BLADE TO PUNCTURE OR CUT.: Brand: BARD-PARKER ® CARBON RIB-BACK BLADES

## (undated) DEVICE — INSULATED BLADE ELECTRODE: Brand: EDGE

## (undated) DEVICE — SPONGE LAP 18X18IN STRL -- 5/PK

## (undated) DEVICE — DRAPE,REIN 53X77,STERILE: Brand: MEDLINE

## (undated) DEVICE — NEEDLE HYPO 25GA L1.5IN BVL ORIENTED ECLIPSE

## (undated) DEVICE — WRAP SURG W1.31XL1.34M CARD FOR PT 165-172CM THERMOWRP

## (undated) DEVICE — INFECTION CONTROL KIT SYS

## (undated) DEVICE — BLADE ELECTRODE: Brand: VALLEYLAB

## (undated) DEVICE — SOL IRR SOD CL 0.9% 1000ML BTL --

## (undated) DEVICE — NEEDLE HYPO 25GA L1.5IN BLU POLYPR HUB S STL REG BVL STR

## (undated) DEVICE — NEEDLE HYPO 22GA L1.5IN BLK S STL HUB POLYPR SHLD REG BVL

## (undated) DEVICE — DRAPE,LAPAROTOMY,T,PEDI,STERILE: Brand: MEDLINE

## (undated) DEVICE — GOWN,SIRUS,NONRNF,SETINSLV,XL,20/CS: Brand: MEDLINE

## (undated) DEVICE — SHEAR RMFG HARMONIC FOCUS 9CM -- OEM ITEM L#322125

## (undated) DEVICE — APPLICATOR BNDG 1MM ADH PREMIERPRO EXOFIN

## (undated) DEVICE — BASIC PACK: Brand: CONVERTORS

## (undated) DEVICE — ASTOUND STANDARD SURGICAL GOWN, XL: Brand: CONVERTORS

## (undated) DEVICE — DERMABOND SKIN ADH 0.7ML -- DERMABOND ADVANCED 12/BX

## (undated) DEVICE — 3M™ TEGADERM™ TRANSPARENT FILM DRESSING FRAME STYLE, 1626W, 4 IN X 4-3/4 IN (10 CM X 12 CM), 50/CT 4CT/CASE: Brand: 3M™ TEGADERM™

## (undated) DEVICE — SUTURE VCRL SZ 2-0 L27IN ABSRB UD L26MM SH 1/2 CIR J417H

## (undated) DEVICE — PACK,BASIC,SIRUS,V: Brand: MEDLINE

## (undated) DEVICE — TUBING, SUCTION, 1/4" X 10', STRAIGHT: Brand: MEDLINE

## (undated) DEVICE — DRAPE XR C ARM 41X74IN LF --

## (undated) DEVICE — SUTURE VCRL SZ 2-0 L27IN ABSRB UD L26MM CT-2 1/2 CIR J269H

## (undated) DEVICE — SUTURE PERMA-HAND SZ 3-0 L18IN NONABSORBABLE BLK L24MM PS-1 1684G

## (undated) DEVICE — BRA SURG POST-OP MED 38IN --

## (undated) DEVICE — SLIM BODY SKIN STAPLER: Brand: APPOSE ULC

## (undated) DEVICE — GAUZE SPONGES,12 PLY: Brand: CURITY

## (undated) DEVICE — SOLUTION IV 500ML 0.9% SOD CHL FLX CONT

## (undated) DEVICE — TRAY CATH OD16FR SIL URIN M STATLOK STBL DEV SURSTP